# Patient Record
Sex: FEMALE | Race: WHITE | Employment: UNEMPLOYED | URBAN - METROPOLITAN AREA
[De-identification: names, ages, dates, MRNs, and addresses within clinical notes are randomized per-mention and may not be internally consistent; named-entity substitution may affect disease eponyms.]

---

## 2017-03-08 ENCOUNTER — HOSPITAL ENCOUNTER (INPATIENT)
Age: 42
LOS: 3 days | Discharge: HOME | DRG: 881 | End: 2017-03-11
Attending: EMERGENCY MEDICINE | Admitting: PSYCHIATRY & NEUROLOGY
Payer: OTHER GOVERNMENT

## 2017-03-08 DIAGNOSIS — F10.929 ACUTE ALCOHOLIC INTOXICATION, WITH UNSPECIFIED COMPLICATION (HCC): Primary | ICD-10-CM

## 2017-03-08 DIAGNOSIS — R45.851 SUICIDAL IDEATION: ICD-10-CM

## 2017-03-08 LAB
ABSOLUTE BANDS #: 0.05 K/UL (ref 0–1)
ABSOLUTE EOS #: 0.05 K/UL (ref 0–0.4)
ABSOLUTE LYMPH #: 2.28 K/UL (ref 1–4.8)
ABSOLUTE MONO #: 0.32 K/UL (ref 0.1–1.3)
ALBUMIN SERPL-MCNC: 3.7 G/DL (ref 3.5–5.2)
ALBUMIN/GLOBULIN RATIO: ABNORMAL (ref 1–2.5)
ALP BLD-CCNC: 81 U/L (ref 35–104)
ALT SERPL-CCNC: 98 U/L (ref 5–33)
ANION GAP SERPL CALCULATED.3IONS-SCNC: 19 MMOL/L (ref 9–17)
AST SERPL-CCNC: 108 U/L
ATYPICAL LYMPHOCYTE ABSOLUTE COUNT: 0.18 K/UL
ATYPICAL LYMPHOCYTES: 4 % (ref 0–10)
BANDS: 1 % (ref 0–10)
BASOPHILS # BLD: 0 % (ref 0–2)
BASOPHILS ABSOLUTE: 0 K/UL (ref 0–0.2)
BILIRUB SERPL-MCNC: <0.15 MG/DL (ref 0.3–1.2)
BUN BLDV-MCNC: 8 MG/DL (ref 6–20)
BUN/CREAT BLD: ABNORMAL (ref 9–20)
CALCIUM SERPL-MCNC: 8.8 MG/DL (ref 8.6–10.4)
CHLORIDE BLD-SCNC: 98 MMOL/L (ref 98–107)
CO2: 25 MMOL/L (ref 20–31)
CREAT SERPL-MCNC: 0.68 MG/DL (ref 0.5–0.9)
DIFFERENTIAL TYPE: ABNORMAL
EOSINOPHILS RELATIVE PERCENT: 1 % (ref 0–4)
ETHANOL PERCENT: 0.21 %
ETHANOL: 209 MG/DL
GFR AFRICAN AMERICAN: >60 ML/MIN
GFR NON-AFRICAN AMERICAN: >60 ML/MIN
GFR SERPL CREATININE-BSD FRML MDRD: ABNORMAL ML/MIN/{1.73_M2}
GFR SERPL CREATININE-BSD FRML MDRD: ABNORMAL ML/MIN/{1.73_M2}
GLUCOSE BLD-MCNC: 100 MG/DL (ref 70–99)
HCT VFR BLD CALC: 45.4 % (ref 36–46)
HEMOGLOBIN: 15.6 G/DL (ref 12–16)
LIPASE: 45 U/L (ref 13–60)
LYMPHOCYTES # BLD: 51 % (ref 24–44)
MCH RBC QN AUTO: 33.8 PG (ref 26–34)
MCHC RBC AUTO-ENTMCNC: 34.4 G/DL (ref 31–37)
MCV RBC AUTO: 98.3 FL (ref 80–100)
MONOCYTES # BLD: 7 % (ref 1–7)
MORPHOLOGY: NORMAL
PDW BLD-RTO: 14 % (ref 11.5–14.9)
PLATELET # BLD: 220 K/UL (ref 150–450)
PLATELET ESTIMATE: ABNORMAL
PMV BLD AUTO: 7 FL (ref 6–12)
POTASSIUM SERPL-SCNC: 3.6 MMOL/L (ref 3.7–5.3)
RBC # BLD: 4.61 M/UL (ref 4–5.2)
RBC # BLD: ABNORMAL 10*6/UL
SEG NEUTROPHILS: 36 % (ref 36–66)
SEGMENTED NEUTROPHILS ABSOLUTE COUNT: 1.62 K/UL (ref 1.3–9.1)
SODIUM BLD-SCNC: 142 MMOL/L (ref 135–144)
TOTAL PROTEIN: 6.7 G/DL (ref 6.4–8.3)
WBC # BLD: 4.5 K/UL (ref 3.5–11)
WBC # BLD: ABNORMAL 10*3/UL

## 2017-03-08 PROCEDURE — 36415 COLL VENOUS BLD VENIPUNCTURE: CPT

## 2017-03-08 PROCEDURE — 83690 ASSAY OF LIPASE: CPT

## 2017-03-08 PROCEDURE — 80053 COMPREHEN METABOLIC PANEL: CPT

## 2017-03-08 PROCEDURE — 99285 EMERGENCY DEPT VISIT HI MDM: CPT

## 2017-03-08 PROCEDURE — 1240000000 HC EMOTIONAL WELLNESS R&B

## 2017-03-08 PROCEDURE — G0480 DRUG TEST DEF 1-7 CLASSES: HCPCS

## 2017-03-08 PROCEDURE — 85025 COMPLETE CBC W/AUTO DIFF WBC: CPT

## 2017-03-08 RX ORDER — NICOTINE 21 MG/24HR
1 PATCH, TRANSDERMAL 24 HOURS TRANSDERMAL DAILY
Status: CANCELLED | OUTPATIENT
Start: 2017-03-09

## 2017-03-08 ASSESSMENT — ENCOUNTER SYMPTOMS
RHINORRHEA: 0
TROUBLE SWALLOWING: 0
ABDOMINAL PAIN: 0
COUGH: 0
VOMITING: 1
EYE PAIN: 0
PHOTOPHOBIA: 0
NAUSEA: 1
EYE DISCHARGE: 0
SORE THROAT: 0
DIARRHEA: 1
SHORTNESS OF BREATH: 0

## 2017-03-09 LAB
-: ABNORMAL
AMORPHOUS: ABNORMAL
AMPHETAMINE SCREEN URINE: NEGATIVE
BACTERIA: ABNORMAL
BARBITURATE SCREEN URINE: NEGATIVE
BENZODIAZEPINE SCREEN, URINE: NEGATIVE
BILIRUBIN URINE: ABNORMAL
BUPRENORPHINE URINE: ABNORMAL
CANNABINOID SCREEN URINE: POSITIVE
CASTS UA: ABNORMAL /LPF
COCAINE METABOLITE, URINE: NEGATIVE
COLOR: ABNORMAL
COMMENT UA: ABNORMAL
CRYSTALS, UA: ABNORMAL /HPF
EPITHELIAL CELLS UA: ABNORMAL /HPF
GLUCOSE URINE: NEGATIVE
KETONES, URINE: ABNORMAL
LEUKOCYTE ESTERASE, URINE: NEGATIVE
MDMA URINE: ABNORMAL
METHADONE SCREEN, URINE: NEGATIVE
METHAMPHETAMINE, URINE: ABNORMAL
MUCUS: ABNORMAL
NITRITE, URINE: NEGATIVE
OPIATES, URINE: NEGATIVE
OTHER OBSERVATIONS UA: ABNORMAL
OXYCODONE SCREEN URINE: NEGATIVE
PH UA: 6 (ref 5–8)
PHENCYCLIDINE, URINE: NEGATIVE
PROPOXYPHENE, URINE: ABNORMAL
PROTEIN UA: ABNORMAL
RBC UA: ABNORMAL /HPF
RENAL EPITHELIAL, UA: ABNORMAL /HPF
SPECIFIC GRAVITY UA: 1.03 (ref 1–1.03)
TEST INFORMATION: ABNORMAL
TRICHOMONAS: ABNORMAL
TRICYCLIC ANTIDEPRESSANTS, UR: ABNORMAL
TURBIDITY: ABNORMAL
URINE HGB: NEGATIVE
UROBILINOGEN, URINE: NORMAL
WBC UA: ABNORMAL /HPF
YEAST: ABNORMAL

## 2017-03-09 PROCEDURE — 94664 DEMO&/EVAL PT USE INHALER: CPT

## 2017-03-09 PROCEDURE — 6370000000 HC RX 637 (ALT 250 FOR IP): Performed by: PSYCHIATRY & NEUROLOGY

## 2017-03-09 PROCEDURE — 81001 URINALYSIS AUTO W/SCOPE: CPT

## 2017-03-09 PROCEDURE — 80307 DRUG TEST PRSMV CHEM ANLYZR: CPT

## 2017-03-09 PROCEDURE — 1240000000 HC EMOTIONAL WELLNESS R&B

## 2017-03-09 RX ORDER — THIAMINE MONONITRATE (VIT B1) 100 MG
100 TABLET ORAL DAILY
Status: DISCONTINUED | OUTPATIENT
Start: 2017-03-09 | End: 2017-03-11 | Stop reason: HOSPADM

## 2017-03-09 RX ORDER — HYDROXYZINE HYDROCHLORIDE 25 MG/1
25 TABLET, FILM COATED ORAL 3 TIMES DAILY PRN
Status: DISCONTINUED | OUTPATIENT
Start: 2017-03-09 | End: 2017-03-11 | Stop reason: HOSPADM

## 2017-03-09 RX ORDER — CHLORDIAZEPOXIDE HYDROCHLORIDE 25 MG/1
25 CAPSULE, GELATIN COATED ORAL 2 TIMES DAILY
Status: DISCONTINUED | OUTPATIENT
Start: 2017-03-09 | End: 2017-03-11 | Stop reason: HOSPADM

## 2017-03-09 RX ORDER — ACETAMINOPHEN 325 MG/1
650 TABLET ORAL EVERY 4 HOURS PRN
Status: DISCONTINUED | OUTPATIENT
Start: 2017-03-09 | End: 2017-03-11 | Stop reason: HOSPADM

## 2017-03-09 RX ORDER — DIPHENHYDRAMINE HCL 25 MG
25 TABLET ORAL NIGHTLY PRN
Status: DISCONTINUED | OUTPATIENT
Start: 2017-03-09 | End: 2017-03-11 | Stop reason: HOSPADM

## 2017-03-09 RX ORDER — M-VIT,TX,IRON,MINS/CALC/FOLIC 27MG-0.4MG
1 TABLET ORAL DAILY
Status: DISCONTINUED | OUTPATIENT
Start: 2017-03-09 | End: 2017-03-11 | Stop reason: HOSPADM

## 2017-03-09 RX ORDER — MAGNESIUM HYDROXIDE/ALUMINUM HYDROXICE/SIMETHICONE 120; 1200; 1200 MG/30ML; MG/30ML; MG/30ML
30 SUSPENSION ORAL PRN
Status: DISCONTINUED | OUTPATIENT
Start: 2017-03-09 | End: 2017-03-11 | Stop reason: HOSPADM

## 2017-03-09 RX ORDER — MIRTAZAPINE 15 MG/1
15 TABLET, FILM COATED ORAL NIGHTLY
Status: DISCONTINUED | OUTPATIENT
Start: 2017-03-09 | End: 2017-03-11 | Stop reason: HOSPADM

## 2017-03-09 RX ORDER — OLANZAPINE 5 MG/1
5 TABLET ORAL NIGHTLY
Status: DISCONTINUED | OUTPATIENT
Start: 2017-03-09 | End: 2017-03-11 | Stop reason: HOSPADM

## 2017-03-09 RX ORDER — TRAZODONE HYDROCHLORIDE 50 MG/1
50 TABLET ORAL NIGHTLY PRN
Status: DISCONTINUED | OUTPATIENT
Start: 2017-03-09 | End: 2017-03-11 | Stop reason: HOSPADM

## 2017-03-09 RX ORDER — FOLIC ACID 1 MG/1
1 TABLET ORAL DAILY
Status: DISCONTINUED | OUTPATIENT
Start: 2017-03-09 | End: 2017-03-11 | Stop reason: HOSPADM

## 2017-03-09 RX ORDER — ALBUTEROL SULFATE 90 UG/1
2 AEROSOL, METERED RESPIRATORY (INHALATION) EVERY 4 HOURS PRN
Status: DISCONTINUED | OUTPATIENT
Start: 2017-03-09 | End: 2017-03-11 | Stop reason: HOSPADM

## 2017-03-09 RX ORDER — BENZTROPINE MESYLATE 1 MG/ML
2 INJECTION INTRAMUSCULAR; INTRAVENOUS 2 TIMES DAILY PRN
Status: DISCONTINUED | OUTPATIENT
Start: 2017-03-09 | End: 2017-03-11 | Stop reason: HOSPADM

## 2017-03-09 RX ADMIN — MULTIPLE VITAMINS W/ MINERALS TAB 1 TABLET: TAB at 09:00

## 2017-03-09 RX ADMIN — Medication 100 MG: at 09:00

## 2017-03-09 RX ADMIN — CHLORDIAZEPOXIDE HYDROCHLORIDE 25 MG: 25 CAPSULE ORAL at 09:00

## 2017-03-09 RX ADMIN — FOLIC ACID 1 MG: 1 TABLET ORAL at 09:00

## 2017-03-09 RX ADMIN — ACETAMINOPHEN 650 MG: 325 TABLET ORAL at 19:53

## 2017-03-09 ASSESSMENT — PAIN SCALES - GENERAL
PAINLEVEL_OUTOF10: 10
PAINLEVEL_OUTOF10: 0
PAINLEVEL_OUTOF10: 3

## 2017-03-09 ASSESSMENT — LIFESTYLE VARIABLES: HISTORY_ALCOHOL_USE: YES

## 2017-03-09 ASSESSMENT — SLEEP AND FATIGUE QUESTIONNAIRES
AVERAGE NUMBER OF SLEEP HOURS: 8
DO YOU HAVE DIFFICULTY SLEEPING: NO
DO YOU USE A SLEEP AID: NO

## 2017-03-10 PROCEDURE — 6370000000 HC RX 637 (ALT 250 FOR IP): Performed by: PSYCHIATRY & NEUROLOGY

## 2017-03-10 PROCEDURE — 1240000000 HC EMOTIONAL WELLNESS R&B

## 2017-03-10 RX ORDER — MIRTAZAPINE 15 MG/1
15 TABLET, FILM COATED ORAL NIGHTLY
Qty: 30 TABLET | Refills: 0 | Status: SHIPPED | OUTPATIENT
Start: 2017-03-10 | End: 2019-06-05 | Stop reason: ALTCHOICE

## 2017-03-10 RX ORDER — OLANZAPINE 5 MG/1
5 TABLET ORAL NIGHTLY
Qty: 30 TABLET | Refills: 0 | Status: SHIPPED | OUTPATIENT
Start: 2017-03-10 | End: 2019-06-05 | Stop reason: ALTCHOICE

## 2017-03-10 RX ADMIN — ACETAMINOPHEN 650 MG: 325 TABLET ORAL at 14:00

## 2017-03-10 RX ADMIN — MIRTAZAPINE 15 MG: 15 TABLET, FILM COATED ORAL at 21:13

## 2017-03-10 RX ADMIN — ACETAMINOPHEN 650 MG: 325 TABLET ORAL at 01:53

## 2017-03-10 RX ADMIN — MULTIPLE VITAMINS W/ MINERALS TAB 1 TABLET: TAB at 08:43

## 2017-03-10 RX ADMIN — Medication 100 MG: at 08:43

## 2017-03-10 RX ADMIN — OLANZAPINE 5 MG: 5 TABLET, FILM COATED ORAL at 21:13

## 2017-03-10 RX ADMIN — CHLORDIAZEPOXIDE HYDROCHLORIDE 25 MG: 25 CAPSULE ORAL at 08:43

## 2017-03-10 RX ADMIN — ACETAMINOPHEN 650 MG: 325 TABLET ORAL at 08:43

## 2017-03-10 RX ADMIN — ACETAMINOPHEN 650 MG: 325 TABLET ORAL at 21:13

## 2017-03-10 RX ADMIN — FOLIC ACID 1 MG: 1 TABLET ORAL at 08:43

## 2017-03-10 ASSESSMENT — PAIN SCALES - GENERAL
PAINLEVEL_OUTOF10: 2
PAINLEVEL_OUTOF10: 3
PAINLEVEL_OUTOF10: 8
PAINLEVEL_OUTOF10: 7
PAINLEVEL_OUTOF10: 3
PAINLEVEL_OUTOF10: 10
PAINLEVEL_OUTOF10: 0
PAINLEVEL_OUTOF10: 10

## 2017-03-11 VITALS
RESPIRATION RATE: 14 BRPM | SYSTOLIC BLOOD PRESSURE: 138 MMHG | TEMPERATURE: 97.9 F | BODY MASS INDEX: 33.8 KG/M2 | WEIGHT: 198 LBS | HEIGHT: 64 IN | HEART RATE: 60 BPM | DIASTOLIC BLOOD PRESSURE: 77 MMHG | OXYGEN SATURATION: 95 %

## 2017-03-11 PROCEDURE — 5130000000 HC BRIDGE APPOINTMENT

## 2017-03-11 PROCEDURE — 6370000000 HC RX 637 (ALT 250 FOR IP): Performed by: PSYCHIATRY & NEUROLOGY

## 2017-03-11 RX ADMIN — ACETAMINOPHEN 650 MG: 325 TABLET ORAL at 06:00

## 2017-03-11 RX ADMIN — MULTIPLE VITAMINS W/ MINERALS TAB 1 TABLET: TAB at 08:14

## 2017-03-11 RX ADMIN — Medication 100 MG: at 08:14

## 2017-03-11 RX ADMIN — FOLIC ACID 1 MG: 1 TABLET ORAL at 08:14

## 2017-03-11 ASSESSMENT — PAIN SCALES - GENERAL
PAINLEVEL_OUTOF10: 3
PAINLEVEL_OUTOF10: 0

## 2017-03-16 ENCOUNTER — APPOINTMENT (OUTPATIENT)
Dept: GENERAL RADIOLOGY | Age: 42
DRG: 138 | End: 2017-03-16
Payer: OTHER GOVERNMENT

## 2017-03-16 ENCOUNTER — HOSPITAL ENCOUNTER (OUTPATIENT)
Age: 42
Setting detail: OBSERVATION
Discharge: HOME | DRG: 138 | End: 2017-03-18
Attending: EMERGENCY MEDICINE | Admitting: SURGERY
Payer: OTHER GOVERNMENT

## 2017-03-16 DIAGNOSIS — S01.81XA FACIAL LACERATION, INITIAL ENCOUNTER: ICD-10-CM
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
## 2017-03-16 DIAGNOSIS — F10.920 ACUTE ALCOHOLIC INTOXICATION, UNCOMPLICATED (HCC): ICD-10-CM

## 2017-03-16 PROBLEM — S01.85XA DOG BITE OF FACE: Status: ACTIVE | Noted: 2017-03-16

## 2017-03-16 PROBLEM — S61.259A DOG BITE OF FINGER: Status: ACTIVE | Noted: 2017-03-16

## 2017-03-16 PROBLEM — F10.929 ACUTE ALCOHOL INTOXICATION (HCC): Status: ACTIVE | Noted: 2017-03-16

## 2017-03-16 LAB
-: NORMAL
ABSOLUTE EOS #: 0.2 K/UL (ref 0–0.4)
ABSOLUTE LYMPH #: 3.3 K/UL (ref 1–4.8)
ABSOLUTE MONO #: 0.5 K/UL (ref 0.1–1.2)
AMORPHOUS: NORMAL
AMPHETAMINE SCREEN URINE: NEGATIVE
ANION GAP SERPL CALCULATED.3IONS-SCNC: 18 MMOL/L (ref 9–17)
BACTERIA: NORMAL
BARBITURATE SCREEN URINE: NEGATIVE
BASOPHILS # BLD: 0 % (ref 0–2)
BASOPHILS ABSOLUTE: 0 K/UL (ref 0–0.2)
BENZODIAZEPINE SCREEN, URINE: NEGATIVE
BILIRUBIN URINE: NEGATIVE
BUN BLDV-MCNC: 9 MG/DL (ref 6–20)
BUN/CREAT BLD: ABNORMAL (ref 9–20)
BUPRENORPHINE URINE: ABNORMAL
CALCIUM SERPL-MCNC: 8.9 MG/DL (ref 8.6–10.4)
CANNABINOID SCREEN URINE: POSITIVE
CASTS UA: NORMAL /LPF (ref 0–8)
CHLORIDE BLD-SCNC: 97 MMOL/L (ref 98–107)
CO2: 20 MMOL/L (ref 20–31)
COCAINE METABOLITE, URINE: NEGATIVE
COLOR: YELLOW
COMMENT UA: ABNORMAL
CREAT SERPL-MCNC: 0.7 MG/DL (ref 0.5–0.9)
CRYSTALS, UA: NORMAL /HPF
DIFFERENTIAL TYPE: ABNORMAL
EOSINOPHILS RELATIVE PERCENT: 4 % (ref 1–4)
EPITHELIAL CELLS UA: NORMAL /HPF (ref 0–5)
ETHANOL PERCENT: 0.22 %
ETHANOL: 216 MG/DL
GFR AFRICAN AMERICAN: >60 ML/MIN
GFR NON-AFRICAN AMERICAN: >60 ML/MIN
GFR SERPL CREATININE-BSD FRML MDRD: ABNORMAL ML/MIN/{1.73_M2}
GFR SERPL CREATININE-BSD FRML MDRD: ABNORMAL ML/MIN/{1.73_M2}
GLUCOSE BLD-MCNC: 99 MG/DL (ref 70–99)
GLUCOSE URINE: NEGATIVE
HCT VFR BLD CALC: 44.6 % (ref 36–46)
HEMOGLOBIN: 15.2 G/DL (ref 12–16)
INR BLD: 0.9
KETONES, URINE: NEGATIVE
LEUKOCYTE ESTERASE, URINE: NEGATIVE
LYMPHOCYTES # BLD: 50 % (ref 24–44)
MCH RBC QN AUTO: 32.9 PG (ref 26–34)
MCHC RBC AUTO-ENTMCNC: 34.1 G/DL (ref 31–37)
MCV RBC AUTO: 96.4 FL (ref 80–100)
MDMA URINE: ABNORMAL
METHADONE SCREEN, URINE: NEGATIVE
METHAMPHETAMINE, URINE: ABNORMAL
MONOCYTES # BLD: 7 % (ref 2–11)
MUCUS: NORMAL
NITRITE, URINE: NEGATIVE
OPIATES, URINE: NEGATIVE
OTHER OBSERVATIONS UA: NORMAL
OXYCODONE SCREEN URINE: NEGATIVE
PDW BLD-RTO: 14.5 % (ref 12.5–15.4)
PH UA: 5 (ref 5–8)
PHENCYCLIDINE, URINE: NEGATIVE
PLATELET # BLD: 288 K/UL (ref 140–450)
PLATELET ESTIMATE: ABNORMAL
PMV BLD AUTO: 7.7 FL (ref 6–12)
POTASSIUM SERPL-SCNC: 4.2 MMOL/L (ref 3.7–5.3)
PROPOXYPHENE, URINE: ABNORMAL
PROTEIN UA: NEGATIVE
PROTHROMBIN TIME: 10 SEC (ref 9.4–12.6)
RBC # BLD: 4.63 M/UL (ref 4–5.2)
RBC # BLD: ABNORMAL 10*6/UL
RBC UA: NORMAL /HPF (ref 0–4)
RENAL EPITHELIAL, UA: NORMAL /HPF
SEG NEUTROPHILS: 39 % (ref 36–66)
SEGMENTED NEUTROPHILS ABSOLUTE COUNT: 2.5 K/UL (ref 1.8–7.7)
SODIUM BLD-SCNC: 135 MMOL/L (ref 135–144)
SPECIFIC GRAVITY UA: 1 (ref 1–1.03)
TEST INFORMATION: ABNORMAL
TRICHOMONAS: NORMAL
TRICYCLIC ANTIDEPRESSANTS, UR: ABNORMAL
TURBIDITY: ABNORMAL
URINE HGB: ABNORMAL
UROBILINOGEN, URINE: NORMAL
WBC # BLD: 6.5 K/UL (ref 3.5–11)
WBC # BLD: ABNORMAL 10*3/UL
WBC UA: NORMAL /HPF (ref 0–5)
YEAST: NORMAL

## 2017-03-16 PROCEDURE — 2580000003 HC RX 258: Performed by: EMERGENCY MEDICINE

## 2017-03-16 PROCEDURE — 80307 DRUG TEST PRSMV CHEM ANLYZR: CPT

## 2017-03-16 PROCEDURE — 99284 EMERGENCY DEPT VISIT MOD MDM: CPT

## 2017-03-16 PROCEDURE — 81001 URINALYSIS AUTO W/SCOPE: CPT

## 2017-03-16 PROCEDURE — 96376 TX/PRO/DX INJ SAME DRUG ADON: CPT

## 2017-03-16 PROCEDURE — 6360000002 HC RX W HCPCS: Performed by: EMERGENCY MEDICINE

## 2017-03-16 PROCEDURE — 85610 PROTHROMBIN TIME: CPT

## 2017-03-16 PROCEDURE — 96365 THER/PROPH/DIAG IV INF INIT: CPT

## 2017-03-16 PROCEDURE — 6370000000 HC RX 637 (ALT 250 FOR IP): Performed by: STUDENT IN AN ORGANIZED HEALTH CARE EDUCATION/TRAINING PROGRAM

## 2017-03-16 PROCEDURE — 6360000002 HC RX W HCPCS: Performed by: STUDENT IN AN ORGANIZED HEALTH CARE EDUCATION/TRAINING PROGRAM

## 2017-03-16 PROCEDURE — 73130 X-RAY EXAM OF HAND: CPT

## 2017-03-16 PROCEDURE — 90471 IMMUNIZATION ADMIN: CPT | Performed by: EMERGENCY MEDICINE

## 2017-03-16 PROCEDURE — 80048 BASIC METABOLIC PNL TOTAL CA: CPT

## 2017-03-16 PROCEDURE — G0480 DRUG TEST DEF 1-7 CLASSES: HCPCS

## 2017-03-16 PROCEDURE — G0378 HOSPITAL OBSERVATION PER HR: HCPCS

## 2017-03-16 PROCEDURE — 90715 TDAP VACCINE 7 YRS/> IM: CPT | Performed by: EMERGENCY MEDICINE

## 2017-03-16 PROCEDURE — 85025 COMPLETE CBC W/AUTO DIFF WBC: CPT

## 2017-03-16 RX ORDER — OXYCODONE HYDROCHLORIDE AND ACETAMINOPHEN 5; 325 MG/1; MG/1
1 TABLET ORAL EVERY 4 HOURS PRN
Status: DISCONTINUED | OUTPATIENT
Start: 2017-03-16 | End: 2017-03-17

## 2017-03-16 RX ORDER — OLANZAPINE 5 MG/1
5 TABLET ORAL NIGHTLY
Status: DISCONTINUED | OUTPATIENT
Start: 2017-03-16 | End: 2017-03-18 | Stop reason: HOSPADM

## 2017-03-16 RX ORDER — LIDOCAINE HYDROCHLORIDE 10 MG/ML
5 INJECTION, SOLUTION INFILTRATION; PERINEURAL ONCE
Status: DISCONTINUED | OUTPATIENT
Start: 2017-03-16 | End: 2017-03-17

## 2017-03-16 RX ORDER — MORPHINE SULFATE 4 MG/ML
4 INJECTION, SOLUTION INTRAMUSCULAR; INTRAVENOUS
Status: DISCONTINUED | OUTPATIENT
Start: 2017-03-16 | End: 2017-03-18 | Stop reason: HOSPADM

## 2017-03-16 RX ORDER — SODIUM CHLORIDE 0.9 % (FLUSH) 0.9 %
10 SYRINGE (ML) INJECTION EVERY 12 HOURS SCHEDULED
Status: DISCONTINUED | OUTPATIENT
Start: 2017-03-16 | End: 2017-03-18 | Stop reason: HOSPADM

## 2017-03-16 RX ORDER — SODIUM CHLORIDE 9 MG/ML
INJECTION, SOLUTION INTRAVENOUS CONTINUOUS
Status: DISCONTINUED | OUTPATIENT
Start: 2017-03-16 | End: 2017-03-18

## 2017-03-16 RX ORDER — MIRTAZAPINE 15 MG/1
15 TABLET, FILM COATED ORAL NIGHTLY
Status: DISCONTINUED | OUTPATIENT
Start: 2017-03-16 | End: 2017-03-18 | Stop reason: HOSPADM

## 2017-03-16 RX ORDER — SODIUM CHLORIDE 0.9 % (FLUSH) 0.9 %
10 SYRINGE (ML) INJECTION PRN
Status: DISCONTINUED | OUTPATIENT
Start: 2017-03-16 | End: 2017-03-18 | Stop reason: HOSPADM

## 2017-03-16 RX ORDER — LIDOCAINE HYDROCHLORIDE 10 MG/ML
5 INJECTION, SOLUTION INFILTRATION; PERINEURAL ONCE
Status: COMPLETED | OUTPATIENT
Start: 2017-03-16 | End: 2017-03-17

## 2017-03-16 RX ORDER — MORPHINE SULFATE 2 MG/ML
2 INJECTION, SOLUTION INTRAMUSCULAR; INTRAVENOUS
Status: DISCONTINUED | OUTPATIENT
Start: 2017-03-16 | End: 2017-03-18 | Stop reason: HOSPADM

## 2017-03-16 RX ORDER — OXYCODONE HYDROCHLORIDE AND ACETAMINOPHEN 5; 325 MG/1; MG/1
2 TABLET ORAL EVERY 4 HOURS PRN
Status: DISCONTINUED | OUTPATIENT
Start: 2017-03-16 | End: 2017-03-17

## 2017-03-16 RX ORDER — BACITRACIN, NEOMYCIN, POLYMYXIN B 400; 3.5; 5 [USP'U]/G; MG/G; [USP'U]/G
OINTMENT TOPICAL 2 TIMES DAILY
Status: DISCONTINUED | OUTPATIENT
Start: 2017-03-16 | End: 2017-03-17

## 2017-03-16 RX ORDER — ALBUTEROL SULFATE 90 UG/1
2 AEROSOL, METERED RESPIRATORY (INHALATION) EVERY 4 HOURS PRN
Status: DISCONTINUED | OUTPATIENT
Start: 2017-03-16 | End: 2017-03-18 | Stop reason: HOSPADM

## 2017-03-16 RX ORDER — 0.9 % SODIUM CHLORIDE 0.9 %
1000 INTRAVENOUS SOLUTION INTRAVENOUS ONCE
Status: COMPLETED | OUTPATIENT
Start: 2017-03-16 | End: 2017-03-16

## 2017-03-16 RX ADMIN — TETANUS TOXOID, REDUCED DIPHTHERIA TOXOID AND ACELLULAR PERTUSSIS VACCINE, ADSORBED 0.5 ML: 5; 2.5; 8; 8; 2.5 SUSPENSION INTRAMUSCULAR at 19:33

## 2017-03-16 RX ADMIN — POLYMYXIN B SULFATE, BACITRACIN ZINC, NEOMYCIN SULFATE: 5000; 3.5; 4 OINTMENT TOPICAL at 23:00

## 2017-03-16 RX ADMIN — SODIUM CHLORIDE 3 G: 900 INJECTION INTRAVENOUS at 20:00

## 2017-03-16 RX ADMIN — SODIUM CHLORIDE: 9 INJECTION, SOLUTION INTRAVENOUS at 22:30

## 2017-03-16 RX ADMIN — SODIUM CHLORIDE 1000 ML: 9 INJECTION, SOLUTION INTRAVENOUS at 19:38

## 2017-03-16 RX ADMIN — MORPHINE SULFATE 4 MG: 4 INJECTION, SOLUTION INTRAMUSCULAR; INTRAVENOUS at 22:40

## 2017-03-16 ASSESSMENT — ENCOUNTER SYMPTOMS
COUGH: 0
CONSTIPATION: 0
SHORTNESS OF BREATH: 0
ABDOMINAL PAIN: 0
PHOTOPHOBIA: 0
NAUSEA: 0
CHEST TIGHTNESS: 0
DIARRHEA: 0
VOMITING: 0

## 2017-03-16 ASSESSMENT — PAIN SCALES - GENERAL
PAINLEVEL_OUTOF10: 10
PAINLEVEL_OUTOF10: 10

## 2017-03-16 ASSESSMENT — PAIN DESCRIPTION - ORIENTATION: ORIENTATION: LEFT

## 2017-03-16 ASSESSMENT — PAIN DESCRIPTION - LOCATION
LOCATION: MOUTH;OTHER (COMMENT)
LOCATION: FACE;MOUTH

## 2017-03-16 ASSESSMENT — PAIN DESCRIPTION - PAIN TYPE
TYPE: ACUTE PAIN
TYPE: ACUTE PAIN

## 2017-03-16 ASSESSMENT — PAIN DESCRIPTION - PROGRESSION: CLINICAL_PROGRESSION: NOT CHANGED

## 2017-03-16 ASSESSMENT — PAIN DESCRIPTION - FREQUENCY: FREQUENCY: CONTINUOUS

## 2017-03-16 ASSESSMENT — PAIN DESCRIPTION - DESCRIPTORS: DESCRIPTORS: CONSTANT

## 2017-03-17 ENCOUNTER — ANESTHESIA (OUTPATIENT)
Dept: OPERATING ROOM | Age: 42
DRG: 138 | End: 2017-03-17
Payer: OTHER GOVERNMENT

## 2017-03-17 ENCOUNTER — ANESTHESIA EVENT (OUTPATIENT)
Dept: OPERATING ROOM | Age: 42
DRG: 138 | End: 2017-03-17
Payer: OTHER GOVERNMENT

## 2017-03-17 VITALS — TEMPERATURE: 95 F | SYSTOLIC BLOOD PRESSURE: 159 MMHG | DIASTOLIC BLOOD PRESSURE: 148 MMHG | OXYGEN SATURATION: 97 %

## 2017-03-17 PROCEDURE — 2500000003 HC RX 250 WO HCPCS: Performed by: SPECIALIST

## 2017-03-17 PROCEDURE — 6360000002 HC RX W HCPCS: Performed by: EMERGENCY MEDICINE

## 2017-03-17 PROCEDURE — 2580000003 HC RX 258: Performed by: STUDENT IN AN ORGANIZED HEALTH CARE EDUCATION/TRAINING PROGRAM

## 2017-03-17 PROCEDURE — 2580000003 HC RX 258

## 2017-03-17 PROCEDURE — 6360000002 HC RX W HCPCS: Performed by: NURSE ANESTHETIST, CERTIFIED REGISTERED

## 2017-03-17 PROCEDURE — 2500000003 HC RX 250 WO HCPCS: Performed by: NURSE ANESTHETIST, CERTIFIED REGISTERED

## 2017-03-17 PROCEDURE — G0378 HOSPITAL OBSERVATION PER HR: HCPCS

## 2017-03-17 PROCEDURE — 6360000002 HC RX W HCPCS: Performed by: ANESTHESIOLOGY

## 2017-03-17 PROCEDURE — 7100000000 HC PACU RECOVERY - FIRST 15 MIN: Performed by: ORAL & MAXILLOFACIAL SURGERY

## 2017-03-17 PROCEDURE — 2500000003 HC RX 250 WO HCPCS

## 2017-03-17 PROCEDURE — 3700000000 HC ANESTHESIA ATTENDED CARE: Performed by: ORAL & MAXILLOFACIAL SURGERY

## 2017-03-17 PROCEDURE — 7100000001 HC PACU RECOVERY - ADDTL 15 MIN: Performed by: ORAL & MAXILLOFACIAL SURGERY

## 2017-03-17 PROCEDURE — 2500000003 HC RX 250 WO HCPCS: Performed by: EMERGENCY MEDICINE

## 2017-03-17 PROCEDURE — 96367 TX/PROPH/DG ADDL SEQ IV INF: CPT

## 2017-03-17 PROCEDURE — 3600000003 HC SURGERY LEVEL 3 BASE: Performed by: ORAL & MAXILLOFACIAL SURGERY

## 2017-03-17 PROCEDURE — 2500000003 HC RX 250 WO HCPCS: Performed by: ORAL & MAXILLOFACIAL SURGERY

## 2017-03-17 PROCEDURE — 96376 TX/PRO/DX INJ SAME DRUG ADON: CPT

## 2017-03-17 PROCEDURE — 2580000003 HC RX 258: Performed by: SPECIALIST

## 2017-03-17 PROCEDURE — 3600000013 HC SURGERY LEVEL 3 ADDTL 15MIN: Performed by: ORAL & MAXILLOFACIAL SURGERY

## 2017-03-17 PROCEDURE — 2580000003 HC RX 258: Performed by: ORAL & MAXILLOFACIAL SURGERY

## 2017-03-17 PROCEDURE — 96366 THER/PROPH/DIAG IV INF ADDON: CPT

## 2017-03-17 PROCEDURE — 6360000002 HC RX W HCPCS: Performed by: STUDENT IN AN ORGANIZED HEALTH CARE EDUCATION/TRAINING PROGRAM

## 2017-03-17 PROCEDURE — 6370000000 HC RX 637 (ALT 250 FOR IP): Performed by: STUDENT IN AN ORGANIZED HEALTH CARE EDUCATION/TRAINING PROGRAM

## 2017-03-17 PROCEDURE — 2580000003 HC RX 258: Performed by: EMERGENCY MEDICINE

## 2017-03-17 PROCEDURE — 3700000001 HC ADD 15 MINUTES (ANESTHESIA): Performed by: ORAL & MAXILLOFACIAL SURGERY

## 2017-03-17 PROCEDURE — 6360000002 HC RX W HCPCS: Performed by: SPECIALIST

## 2017-03-17 PROCEDURE — 2500000003 HC RX 250 WO HCPCS: Performed by: STUDENT IN AN ORGANIZED HEALTH CARE EDUCATION/TRAINING PROGRAM

## 2017-03-17 RX ORDER — ROCURONIUM BROMIDE 10 MG/ML
INJECTION, SOLUTION INTRAVENOUS PRN
Status: DISCONTINUED | OUTPATIENT
Start: 2017-03-17 | End: 2017-03-17 | Stop reason: SDUPTHER

## 2017-03-17 RX ORDER — SODIUM CHLORIDE 9 MG/ML
INJECTION, SOLUTION INTRAVENOUS CONTINUOUS PRN
Status: DISCONTINUED | OUTPATIENT
Start: 2017-03-17 | End: 2017-03-17 | Stop reason: SDUPTHER

## 2017-03-17 RX ORDER — FENTANYL CITRATE 50 UG/ML
25 INJECTION, SOLUTION INTRAMUSCULAR; INTRAVENOUS EVERY 5 MIN PRN
Status: DISCONTINUED | OUTPATIENT
Start: 2017-03-17 | End: 2017-03-17

## 2017-03-17 RX ORDER — FENTANYL CITRATE 50 UG/ML
INJECTION, SOLUTION INTRAMUSCULAR; INTRAVENOUS PRN
Status: DISCONTINUED | OUTPATIENT
Start: 2017-03-17 | End: 2017-03-17 | Stop reason: SDUPTHER

## 2017-03-17 RX ORDER — FENTANYL CITRATE 50 UG/ML
25 INJECTION, SOLUTION INTRAMUSCULAR; INTRAVENOUS EVERY 5 MIN PRN
Status: DISCONTINUED | OUTPATIENT
Start: 2017-03-17 | End: 2017-03-17 | Stop reason: HOSPADM

## 2017-03-17 RX ORDER — FENTANYL CITRATE 50 UG/ML
50 INJECTION, SOLUTION INTRAMUSCULAR; INTRAVENOUS EVERY 5 MIN PRN
Status: DISCONTINUED | OUTPATIENT
Start: 2017-03-17 | End: 2017-03-17 | Stop reason: HOSPADM

## 2017-03-17 RX ORDER — PROPOFOL 10 MG/ML
INJECTION, EMULSION INTRAVENOUS PRN
Status: DISCONTINUED | OUTPATIENT
Start: 2017-03-17 | End: 2017-03-17 | Stop reason: SDUPTHER

## 2017-03-17 RX ORDER — BUPIVACAINE HYDROCHLORIDE 5 MG/ML
INJECTION, SOLUTION EPIDURAL; INTRACAUDAL PRN
Status: DISCONTINUED | OUTPATIENT
Start: 2017-03-17 | End: 2017-03-17 | Stop reason: HOSPADM

## 2017-03-17 RX ORDER — LIDOCAINE HYDROCHLORIDE 10 MG/ML
INJECTION, SOLUTION INFILTRATION; PERINEURAL PRN
Status: DISCONTINUED | OUTPATIENT
Start: 2017-03-17 | End: 2017-03-17 | Stop reason: SDUPTHER

## 2017-03-17 RX ORDER — MAGNESIUM HYDROXIDE 1200 MG/15ML
LIQUID ORAL CONTINUOUS PRN
Status: DISCONTINUED | OUTPATIENT
Start: 2017-03-17 | End: 2017-03-17 | Stop reason: HOSPADM

## 2017-03-17 RX ORDER — ONDANSETRON 2 MG/ML
4 INJECTION INTRAMUSCULAR; INTRAVENOUS
Status: ACTIVE | OUTPATIENT
Start: 2017-03-17 | End: 2017-03-17

## 2017-03-17 RX ORDER — DIPHENHYDRAMINE HYDROCHLORIDE 50 MG/ML
12.5 INJECTION INTRAMUSCULAR; INTRAVENOUS
Status: DISCONTINUED | OUTPATIENT
Start: 2017-03-17 | End: 2017-03-17 | Stop reason: HOSPADM

## 2017-03-17 RX ORDER — MAGNESIUM HYDROXIDE 1200 MG/15ML
LIQUID ORAL
Status: COMPLETED
Start: 2017-03-17 | End: 2017-03-17

## 2017-03-17 RX ORDER — MIDAZOLAM HYDROCHLORIDE 1 MG/ML
1 INJECTION INTRAMUSCULAR; INTRAVENOUS EVERY 5 MIN PRN
Status: DISCONTINUED | OUTPATIENT
Start: 2017-03-17 | End: 2017-03-18 | Stop reason: HOSPADM

## 2017-03-17 RX ORDER — NEOSTIGMINE METHYLSULFATE 1 MG/ML
INJECTION, SOLUTION INTRAVENOUS PRN
Status: DISCONTINUED | OUTPATIENT
Start: 2017-03-17 | End: 2017-03-17 | Stop reason: SDUPTHER

## 2017-03-17 RX ORDER — LABETALOL HYDROCHLORIDE 5 MG/ML
INJECTION, SOLUTION INTRAVENOUS PRN
Status: DISCONTINUED | OUTPATIENT
Start: 2017-03-17 | End: 2017-03-17 | Stop reason: SDUPTHER

## 2017-03-17 RX ORDER — GLYCOPYRROLATE 0.2 MG/ML
INJECTION INTRAMUSCULAR; INTRAVENOUS PRN
Status: DISCONTINUED | OUTPATIENT
Start: 2017-03-17 | End: 2017-03-17 | Stop reason: SDUPTHER

## 2017-03-17 RX ADMIN — SODIUM CHLORIDE: 9 INJECTION, SOLUTION INTRAVENOUS at 15:41

## 2017-03-17 RX ADMIN — LABETALOL HYDROCHLORIDE 5 MG: 5 INJECTION, SOLUTION INTRAVENOUS at 15:38

## 2017-03-17 RX ADMIN — POLYMYXIN B SULFATE, BACITRACIN ZINC, NEOMYCIN SULFATE: 5000; 3.5; 4 OINTMENT TOPICAL at 08:56

## 2017-03-17 RX ADMIN — MORPHINE SULFATE 4 MG: 4 INJECTION, SOLUTION INTRAMUSCULAR; INTRAVENOUS at 07:43

## 2017-03-17 RX ADMIN — MORPHINE SULFATE 4 MG: 4 INJECTION, SOLUTION INTRAMUSCULAR; INTRAVENOUS at 00:40

## 2017-03-17 RX ADMIN — MORPHINE SULFATE 4 MG: 4 INJECTION, SOLUTION INTRAMUSCULAR; INTRAVENOUS at 09:52

## 2017-03-17 RX ADMIN — Medication 5 ML: at 00:29

## 2017-03-17 RX ADMIN — MORPHINE SULFATE 4 MG: 4 INJECTION, SOLUTION INTRAMUSCULAR; INTRAVENOUS at 19:21

## 2017-03-17 RX ADMIN — MORPHINE SULFATE 4 MG: 4 INJECTION, SOLUTION INTRAMUSCULAR; INTRAVENOUS at 14:01

## 2017-03-17 RX ADMIN — ALBUTEROL SULFATE 5 PUFF: 90 AEROSOL, METERED RESPIRATORY (INHALATION) at 14:59

## 2017-03-17 RX ADMIN — SODIUM CHLORIDE 3 G: 900 INJECTION INTRAVENOUS at 19:21

## 2017-03-17 RX ADMIN — SODIUM CHLORIDE 3 G: 900 INJECTION INTRAVENOUS at 08:54

## 2017-03-17 RX ADMIN — MIDAZOLAM HYDROCHLORIDE 2 MG: 1 INJECTION, SOLUTION INTRAMUSCULAR; INTRAVENOUS at 14:57

## 2017-03-17 RX ADMIN — PROPOFOL 200 MG: 10 INJECTION, EMULSION INTRAVENOUS at 14:57

## 2017-03-17 RX ADMIN — MORPHINE SULFATE 4 MG: 4 INJECTION, SOLUTION INTRAMUSCULAR; INTRAVENOUS at 22:39

## 2017-03-17 RX ADMIN — SODIUM CHLORIDE 3 G: 900 INJECTION INTRAVENOUS at 14:01

## 2017-03-17 RX ADMIN — SODIUM CHLORIDE 1000 ML: 900 IRRIGANT IRRIGATION at 12:07

## 2017-03-17 RX ADMIN — ROCURONIUM BROMIDE 50 MG: 10 INJECTION INTRAVENOUS at 14:57

## 2017-03-17 RX ADMIN — FENTANYL CITRATE 100 MCG: 50 INJECTION INTRAMUSCULAR; INTRAVENOUS at 14:57

## 2017-03-17 RX ADMIN — FOLIC ACID: 5 INJECTION, SOLUTION INTRAMUSCULAR; INTRAVENOUS; SUBCUTANEOUS at 09:35

## 2017-03-17 RX ADMIN — MORPHINE SULFATE 4 MG: 4 INJECTION, SOLUTION INTRAMUSCULAR; INTRAVENOUS at 12:06

## 2017-03-17 RX ADMIN — LIDOCAINE HYDROCHLORIDE 50 MG: 10 INJECTION, SOLUTION INFILTRATION; PERINEURAL at 14:57

## 2017-03-17 RX ADMIN — NEOSTIGMINE METHYLSULFATE 4.5 MG: 1 INJECTION INTRAVENOUS at 15:48

## 2017-03-17 RX ADMIN — GLYCOPYRROLATE 0.8 MG: 0.2 INJECTION INTRAMUSCULAR; INTRAVENOUS at 15:48

## 2017-03-17 RX ADMIN — MORPHINE SULFATE 4 MG: 4 INJECTION, SOLUTION INTRAMUSCULAR; INTRAVENOUS at 04:37

## 2017-03-17 RX ADMIN — MORPHINE SULFATE 4 MG: 4 INJECTION, SOLUTION INTRAMUSCULAR; INTRAVENOUS at 17:19

## 2017-03-17 RX ADMIN — FENTANYL CITRATE 50 MCG: 50 INJECTION INTRAMUSCULAR; INTRAVENOUS at 15:10

## 2017-03-17 RX ADMIN — FENTANYL CITRATE 50 MCG: 50 INJECTION INTRAMUSCULAR; INTRAVENOUS at 15:21

## 2017-03-17 RX ADMIN — SODIUM CHLORIDE 3 G: 900 INJECTION INTRAVENOUS at 02:30

## 2017-03-17 ASSESSMENT — PAIN DESCRIPTION - ONSET: ONSET: ON-GOING

## 2017-03-17 ASSESSMENT — PAIN SCALES - GENERAL
PAINLEVEL_OUTOF10: 10
PAINLEVEL_OUTOF10: 10
PAINLEVEL_OUTOF10: 6
PAINLEVEL_OUTOF10: 10
PAINLEVEL_OUTOF10: 8
PAINLEVEL_OUTOF10: 6
PAINLEVEL_OUTOF10: 10
PAINLEVEL_OUTOF10: 6
PAINLEVEL_OUTOF10: 10
PAINLEVEL_OUTOF10: 0
PAINLEVEL_OUTOF10: 9
PAINLEVEL_OUTOF10: 6
PAINLEVEL_OUTOF10: 10
PAINLEVEL_OUTOF10: 6
PAINLEVEL_OUTOF10: 10
PAINLEVEL_OUTOF10: 10

## 2017-03-17 ASSESSMENT — PAIN - FUNCTIONAL ASSESSMENT: PAIN_FUNCTIONAL_ASSESSMENT: 0-10

## 2017-03-17 ASSESSMENT — PAIN DESCRIPTION - PROGRESSION: CLINICAL_PROGRESSION: NOT CHANGED

## 2017-03-17 ASSESSMENT — PAIN DESCRIPTION - ORIENTATION: ORIENTATION: LEFT;MID

## 2017-03-17 ASSESSMENT — PAIN DESCRIPTION - LOCATION: LOCATION: FACE

## 2017-03-17 ASSESSMENT — PAIN DESCRIPTION - DESCRIPTORS: DESCRIPTORS: ACHING;DISCOMFORT

## 2017-03-17 ASSESSMENT — PAIN DESCRIPTION - PAIN TYPE: TYPE: ACUTE PAIN;SURGICAL PAIN

## 2017-03-17 ASSESSMENT — ENCOUNTER SYMPTOMS
SHORTNESS OF BREATH: 0
STRIDOR: 0

## 2017-03-17 ASSESSMENT — PAIN DESCRIPTION - FREQUENCY: FREQUENCY: CONTINUOUS

## 2017-03-18 ENCOUNTER — APPOINTMENT (OUTPATIENT)
Dept: GENERAL RADIOLOGY | Age: 42
DRG: 138 | End: 2017-03-18
Payer: OTHER GOVERNMENT

## 2017-03-18 VITALS
HEART RATE: 67 BPM | TEMPERATURE: 98.2 F | SYSTOLIC BLOOD PRESSURE: 166 MMHG | WEIGHT: 202 LBS | BODY MASS INDEX: 35.79 KG/M2 | OXYGEN SATURATION: 95 % | DIASTOLIC BLOOD PRESSURE: 93 MMHG | RESPIRATION RATE: 16 BRPM | HEIGHT: 63 IN

## 2017-03-18 LAB
MYOGLOBIN: 116 NG/ML (ref 25–58)
TROPONIN INTERP: ABNORMAL
TROPONIN T: <0.03 NG/ML

## 2017-03-18 PROCEDURE — 2580000003 HC RX 258: Performed by: EMERGENCY MEDICINE

## 2017-03-18 PROCEDURE — 6370000000 HC RX 637 (ALT 250 FOR IP): Performed by: EMERGENCY MEDICINE

## 2017-03-18 PROCEDURE — 84484 ASSAY OF TROPONIN QUANT: CPT

## 2017-03-18 PROCEDURE — 96376 TX/PRO/DX INJ SAME DRUG ADON: CPT

## 2017-03-18 PROCEDURE — 96366 THER/PROPH/DIAG IV INF ADDON: CPT

## 2017-03-18 PROCEDURE — 71010 XR CHEST LIMITED: CPT

## 2017-03-18 PROCEDURE — 6360000002 HC RX W HCPCS: Performed by: EMERGENCY MEDICINE

## 2017-03-18 PROCEDURE — 6360000002 HC RX W HCPCS: Performed by: STUDENT IN AN ORGANIZED HEALTH CARE EDUCATION/TRAINING PROGRAM

## 2017-03-18 PROCEDURE — 2580000003 HC RX 258: Performed by: STUDENT IN AN ORGANIZED HEALTH CARE EDUCATION/TRAINING PROGRAM

## 2017-03-18 PROCEDURE — 2500000003 HC RX 250 WO HCPCS: Performed by: STUDENT IN AN ORGANIZED HEALTH CARE EDUCATION/TRAINING PROGRAM

## 2017-03-18 PROCEDURE — 83874 ASSAY OF MYOGLOBIN: CPT

## 2017-03-18 PROCEDURE — 36415 COLL VENOUS BLD VENIPUNCTURE: CPT

## 2017-03-18 PROCEDURE — G0378 HOSPITAL OBSERVATION PER HR: HCPCS

## 2017-03-18 PROCEDURE — 93005 ELECTROCARDIOGRAM TRACING: CPT

## 2017-03-18 RX ORDER — OXYCODONE HCL 5 MG/5 ML
5 SOLUTION, ORAL ORAL EVERY 4 HOURS PRN
Status: DISCONTINUED | OUTPATIENT
Start: 2017-03-18 | End: 2017-03-18 | Stop reason: HOSPADM

## 2017-03-18 RX ORDER — HYDROCODONE BITARTRATE AND ACETAMINOPHEN 5; 325 MG/1; MG/1
1 TABLET ORAL EVERY 6 HOURS PRN
Qty: 20 TABLET | Refills: 0 | Status: SHIPPED | OUTPATIENT
Start: 2017-03-18 | End: 2017-03-25

## 2017-03-18 RX ORDER — IBUPROFEN 600 MG/1
600 TABLET ORAL EVERY 6 HOURS PRN
Qty: 30 TABLET | Refills: 0 | Status: SHIPPED | OUTPATIENT
Start: 2017-03-18 | End: 2019-05-19

## 2017-03-18 RX ORDER — AMOXICILLIN AND CLAVULANATE POTASSIUM 875; 125 MG/1; MG/1
1 TABLET, FILM COATED ORAL 2 TIMES DAILY
Qty: 20 TABLET | Refills: 0 | Status: SHIPPED | OUTPATIENT
Start: 2017-03-18 | End: 2017-03-28

## 2017-03-18 RX ADMIN — OXYCODONE HYDROCHLORIDE 5 MG: 5 SOLUTION ORAL at 10:31

## 2017-03-18 RX ADMIN — SODIUM CHLORIDE 3 G: 900 INJECTION INTRAVENOUS at 07:50

## 2017-03-18 RX ADMIN — MORPHINE SULFATE 4 MG: 4 INJECTION, SOLUTION INTRAMUSCULAR; INTRAVENOUS at 04:21

## 2017-03-18 RX ADMIN — SODIUM CHLORIDE: 9 INJECTION, SOLUTION INTRAVENOUS at 00:17

## 2017-03-18 RX ADMIN — MORPHINE SULFATE 4 MG: 4 INJECTION, SOLUTION INTRAMUSCULAR; INTRAVENOUS at 01:49

## 2017-03-18 RX ADMIN — FOLIC ACID: 5 INJECTION, SOLUTION INTRAMUSCULAR; INTRAVENOUS; SUBCUTANEOUS at 09:03

## 2017-03-18 RX ADMIN — MORPHINE SULFATE 4 MG: 4 INJECTION, SOLUTION INTRAMUSCULAR; INTRAVENOUS at 07:39

## 2017-03-18 RX ADMIN — SODIUM CHLORIDE 3 G: 900 INJECTION INTRAVENOUS at 01:44

## 2017-03-18 ASSESSMENT — PAIN SCALES - GENERAL
PAINLEVEL_OUTOF10: 8
PAINLEVEL_OUTOF10: 8
PAINLEVEL_OUTOF10: 6
PAINLEVEL_OUTOF10: 10
PAINLEVEL_OUTOF10: 6
PAINLEVEL_OUTOF10: 6
PAINLEVEL_OUTOF10: 5
PAINLEVEL_OUTOF10: 4
PAINLEVEL_OUTOF10: 5
PAINLEVEL_OUTOF10: 8

## 2017-03-20 LAB
EKG ATRIAL RATE: 67 BPM
EKG P AXIS: 56 DEGREES
EKG P-R INTERVAL: 154 MS
EKG Q-T INTERVAL: 384 MS
EKG QRS DURATION: 86 MS
EKG QTC CALCULATION (BAZETT): 405 MS
EKG R AXIS: 42 DEGREES
EKG T AXIS: 33 DEGREES
EKG VENTRICULAR RATE: 67 BPM

## 2017-03-23 ENCOUNTER — TELEPHONE (OUTPATIENT)
Dept: SURGERY | Age: 42
End: 2017-03-23

## 2017-05-22 ENCOUNTER — HOSPITAL ENCOUNTER (EMERGENCY)
Age: 42
Discharge: HOME | End: 2017-05-22
Attending: EMERGENCY MEDICINE
Payer: OTHER GOVERNMENT

## 2017-05-22 VITALS
HEART RATE: 76 BPM | DIASTOLIC BLOOD PRESSURE: 94 MMHG | RESPIRATION RATE: 16 BRPM | TEMPERATURE: 97.8 F | WEIGHT: 200 LBS | OXYGEN SATURATION: 98 % | SYSTOLIC BLOOD PRESSURE: 136 MMHG | HEIGHT: 63 IN | BODY MASS INDEX: 35.44 KG/M2

## 2017-05-22 DIAGNOSIS — R52 BODY ACHES: Primary | ICD-10-CM

## 2017-05-22 LAB
-: ABNORMAL
ABSOLUTE EOS #: 0.2 K/UL (ref 0–0.4)
ABSOLUTE LYMPH #: 2.7 K/UL (ref 1–4.8)
ABSOLUTE MONO #: 0.4 K/UL (ref 0.1–1.2)
AMORPHOUS: ABNORMAL
ANION GAP SERPL CALCULATED.3IONS-SCNC: 16 MMOL/L (ref 9–17)
BACTERIA: ABNORMAL
BASOPHILS # BLD: 1 %
BASOPHILS ABSOLUTE: 0 K/UL (ref 0–0.2)
BILIRUBIN URINE: NEGATIVE
BUN BLDV-MCNC: 13 MG/DL (ref 6–20)
BUN/CREAT BLD: ABNORMAL (ref 9–20)
CALCIUM SERPL-MCNC: 9.6 MG/DL (ref 8.6–10.4)
CASTS UA: ABNORMAL /LPF (ref 0–8)
CHLORIDE BLD-SCNC: 95 MMOL/L (ref 98–107)
CO2: 22 MMOL/L (ref 20–31)
COLOR: ABNORMAL
CREAT SERPL-MCNC: 0.65 MG/DL (ref 0.5–0.9)
CRYSTALS, UA: ABNORMAL /HPF
DIFFERENTIAL TYPE: NORMAL
EOSINOPHILS RELATIVE PERCENT: 2 %
EPITHELIAL CELLS UA: ABNORMAL /HPF (ref 0–5)
GFR AFRICAN AMERICAN: >60 ML/MIN
GFR NON-AFRICAN AMERICAN: >60 ML/MIN
GFR SERPL CREATININE-BSD FRML MDRD: ABNORMAL ML/MIN/{1.73_M2}
GFR SERPL CREATININE-BSD FRML MDRD: ABNORMAL ML/MIN/{1.73_M2}
GLUCOSE BLD-MCNC: 97 MG/DL (ref 70–99)
GLUCOSE URINE: NEGATIVE
HCT VFR BLD CALC: 43.9 % (ref 36–46)
HEMOGLOBIN: 14.9 G/DL (ref 12–16)
KETONES, URINE: ABNORMAL
LEUKOCYTE ESTERASE, URINE: ABNORMAL
LYMPHOCYTES # BLD: 31 %
MCH RBC QN AUTO: 31.7 PG (ref 26–34)
MCHC RBC AUTO-ENTMCNC: 33.8 G/DL (ref 31–37)
MCV RBC AUTO: 93.7 FL (ref 80–100)
MONOCYTES # BLD: 5 %
MUCUS: ABNORMAL
NITRITE, URINE: NEGATIVE
OTHER OBSERVATIONS UA: ABNORMAL
PDW BLD-RTO: 13.4 % (ref 12.5–15.4)
PH UA: 5.5 (ref 5–8)
PLATELET # BLD: 307 K/UL (ref 140–450)
PLATELET ESTIMATE: NORMAL
PMV BLD AUTO: 7.6 FL (ref 6–12)
POTASSIUM SERPL-SCNC: 4.7 MMOL/L (ref 3.7–5.3)
PROTEIN UA: NEGATIVE
RBC # BLD: 4.69 M/UL (ref 4–5.2)
RBC # BLD: NORMAL 10*6/UL
RBC UA: ABNORMAL /HPF (ref 0–4)
RENAL EPITHELIAL, UA: ABNORMAL /HPF
SEG NEUTROPHILS: 61 %
SEGMENTED NEUTROPHILS ABSOLUTE COUNT: 5.2 K/UL (ref 1.8–7.7)
SODIUM BLD-SCNC: 133 MMOL/L (ref 135–144)
SPECIFIC GRAVITY UA: 1.02 (ref 1–1.03)
TRICHOMONAS: ABNORMAL
TURBIDITY: CLEAR
URINE HGB: NEGATIVE
UROBILINOGEN, URINE: NORMAL
WBC # BLD: 8.6 K/UL (ref 3.5–11)
WBC # BLD: NORMAL 10*3/UL
WBC UA: ABNORMAL /HPF (ref 0–5)
YEAST: ABNORMAL

## 2017-05-22 PROCEDURE — G0383 LEV 4 HOSP TYPE B ED VISIT: HCPCS

## 2017-05-22 PROCEDURE — 85025 COMPLETE CBC W/AUTO DIFF WBC: CPT

## 2017-05-22 PROCEDURE — 80048 BASIC METABOLIC PNL TOTAL CA: CPT

## 2017-05-22 PROCEDURE — 6370000000 HC RX 637 (ALT 250 FOR IP): Performed by: EMERGENCY MEDICINE

## 2017-05-22 PROCEDURE — 81001 URINALYSIS AUTO W/SCOPE: CPT

## 2017-05-22 RX ORDER — IBUPROFEN 400 MG/1
200 TABLET ORAL ONCE
Status: COMPLETED | OUTPATIENT
Start: 2017-05-22 | End: 2017-05-22

## 2017-05-22 RX ADMIN — IBUPROFEN 200 MG: 400 TABLET ORAL at 12:57

## 2017-05-22 ASSESSMENT — PAIN SCALES - GENERAL
PAINLEVEL_OUTOF10: 9
PAINLEVEL_OUTOF10: 9

## 2017-05-22 ASSESSMENT — ENCOUNTER SYMPTOMS
ABDOMINAL PAIN: 0
VOMITING: 0
DIARRHEA: 0
PHOTOPHOBIA: 0
COUGH: 0
CONSTIPATION: 0
BACK PAIN: 1
NAUSEA: 0
CHEST TIGHTNESS: 0
SHORTNESS OF BREATH: 0

## 2017-05-22 ASSESSMENT — PAIN DESCRIPTION - LOCATION: LOCATION: GENERALIZED

## 2017-05-22 ASSESSMENT — PAIN DESCRIPTION - PAIN TYPE: TYPE: ACUTE PAIN

## 2017-05-22 ASSESSMENT — PAIN DESCRIPTION - FREQUENCY: FREQUENCY: CONTINUOUS

## 2017-05-22 ASSESSMENT — PAIN DESCRIPTION - PROGRESSION: CLINICAL_PROGRESSION: NOT CHANGED

## 2017-05-22 ASSESSMENT — PAIN DESCRIPTION - DESCRIPTORS: DESCRIPTORS: CONSTANT

## 2017-06-09 ENCOUNTER — APPOINTMENT (OUTPATIENT)
Dept: GENERAL RADIOLOGY | Age: 42
End: 2017-06-09
Payer: OTHER GOVERNMENT

## 2017-06-09 ENCOUNTER — HOSPITAL ENCOUNTER (EMERGENCY)
Age: 42
Discharge: HOME | End: 2017-06-09
Attending: EMERGENCY MEDICINE
Payer: OTHER GOVERNMENT

## 2017-06-09 VITALS
OXYGEN SATURATION: 97 % | RESPIRATION RATE: 18 BRPM | WEIGHT: 200 LBS | DIASTOLIC BLOOD PRESSURE: 81 MMHG | BODY MASS INDEX: 35.44 KG/M2 | HEIGHT: 63 IN | TEMPERATURE: 97.8 F | SYSTOLIC BLOOD PRESSURE: 113 MMHG | HEART RATE: 86 BPM

## 2017-06-09 DIAGNOSIS — J40 BRONCHITIS: Primary | ICD-10-CM

## 2017-06-09 DIAGNOSIS — R11.10 POST-TUSSIVE EMESIS: ICD-10-CM

## 2017-06-09 LAB
ABSOLUTE EOS #: 0.1 K/UL (ref 0–0.4)
ABSOLUTE LYMPH #: 1.8 K/UL (ref 1–4.8)
ABSOLUTE MONO #: 0.3 K/UL (ref 0.1–1.3)
ALBUMIN SERPL-MCNC: 4.2 G/DL (ref 3.5–5.2)
ALBUMIN/GLOBULIN RATIO: ABNORMAL (ref 1–2.5)
ALP BLD-CCNC: 78 U/L (ref 35–104)
ALT SERPL-CCNC: 33 U/L (ref 5–33)
ANION GAP SERPL CALCULATED.3IONS-SCNC: 14 MMOL/L (ref 9–17)
AST SERPL-CCNC: 35 U/L
BASOPHILS # BLD: 1 %
BASOPHILS ABSOLUTE: 0 K/UL (ref 0–0.2)
BILIRUB SERPL-MCNC: 0.62 MG/DL (ref 0.3–1.2)
BILIRUBIN DIRECT: 0.16 MG/DL
BILIRUBIN, INDIRECT: 0.46 MG/DL (ref 0–1)
BUN BLDV-MCNC: 11 MG/DL (ref 6–20)
BUN/CREAT BLD: NORMAL (ref 9–20)
CALCIUM SERPL-MCNC: 8.9 MG/DL (ref 8.6–10.4)
CHLORIDE BLD-SCNC: 99 MMOL/L (ref 98–107)
CO2: 23 MMOL/L (ref 20–31)
CREAT SERPL-MCNC: 0.7 MG/DL (ref 0.5–0.9)
DIFFERENTIAL TYPE: NORMAL
EOSINOPHILS RELATIVE PERCENT: 3 %
GFR AFRICAN AMERICAN: >60 ML/MIN
GFR NON-AFRICAN AMERICAN: >60 ML/MIN
GFR SERPL CREATININE-BSD FRML MDRD: NORMAL ML/MIN/{1.73_M2}
GFR SERPL CREATININE-BSD FRML MDRD: NORMAL ML/MIN/{1.73_M2}
GLOBULIN: ABNORMAL G/DL (ref 1.5–3.8)
GLUCOSE BLD-MCNC: 94 MG/DL (ref 70–99)
HCT VFR BLD CALC: 41.3 % (ref 36–46)
HEMOGLOBIN: 13.9 G/DL (ref 12–16)
LIPASE: 48 U/L (ref 13–60)
LYMPHOCYTES # BLD: 37 %
MAGNESIUM: 2.1 MG/DL (ref 1.6–2.6)
MCH RBC QN AUTO: 31.9 PG (ref 26–34)
MCHC RBC AUTO-ENTMCNC: 33.7 G/DL (ref 31–37)
MCV RBC AUTO: 94.8 FL (ref 80–100)
MONOCYTES # BLD: 6 %
PDW BLD-RTO: 14.6 % (ref 11.5–14.9)
PLATELET # BLD: 235 K/UL (ref 150–450)
PLATELET ESTIMATE: NORMAL
PMV BLD AUTO: 7.4 FL (ref 6–12)
POTASSIUM SERPL-SCNC: 4.1 MMOL/L (ref 3.7–5.3)
RBC # BLD: 4.35 M/UL (ref 4–5.2)
RBC # BLD: NORMAL 10*6/UL
SEG NEUTROPHILS: 53 %
SEGMENTED NEUTROPHILS ABSOLUTE COUNT: 2.6 K/UL (ref 1.3–9.1)
SODIUM BLD-SCNC: 136 MMOL/L (ref 135–144)
TOTAL PROTEIN: 7.2 G/DL (ref 6.4–8.3)
TROPONIN INTERP: NORMAL
TROPONIN T: <0.03 NG/ML
WBC # BLD: 4.8 K/UL (ref 3.5–11)
WBC # BLD: NORMAL 10*3/UL

## 2017-06-09 PROCEDURE — 83735 ASSAY OF MAGNESIUM: CPT

## 2017-06-09 PROCEDURE — 80048 BASIC METABOLIC PNL TOTAL CA: CPT

## 2017-06-09 PROCEDURE — 6370000000 HC RX 637 (ALT 250 FOR IP): Performed by: EMERGENCY MEDICINE

## 2017-06-09 PROCEDURE — 71020 XR CHEST STANDARD TWO VW: CPT

## 2017-06-09 PROCEDURE — 36415 COLL VENOUS BLD VENIPUNCTURE: CPT

## 2017-06-09 PROCEDURE — 83690 ASSAY OF LIPASE: CPT

## 2017-06-09 PROCEDURE — 85025 COMPLETE CBC W/AUTO DIFF WBC: CPT

## 2017-06-09 PROCEDURE — 80076 HEPATIC FUNCTION PANEL: CPT

## 2017-06-09 PROCEDURE — 99285 EMERGENCY DEPT VISIT HI MDM: CPT

## 2017-06-09 PROCEDURE — 84484 ASSAY OF TROPONIN QUANT: CPT

## 2017-06-09 PROCEDURE — 93005 ELECTROCARDIOGRAM TRACING: CPT

## 2017-06-09 RX ORDER — HYDROCODONE BITARTRATE AND ACETAMINOPHEN 5; 325 MG/1; MG/1
2 TABLET ORAL ONCE
Status: COMPLETED | OUTPATIENT
Start: 2017-06-09 | End: 2017-06-09

## 2017-06-09 RX ORDER — GUAIFENESIN/DEXTROMETHORPHAN 100-10MG/5
5 SYRUP ORAL 3 TIMES DAILY PRN
Qty: 120 ML | Refills: 0 | Status: SHIPPED | OUTPATIENT
Start: 2017-06-09 | End: 2017-06-19

## 2017-06-09 RX ORDER — IBUPROFEN 600 MG/1
600 TABLET ORAL EVERY 6 HOURS PRN
Qty: 30 TABLET | Refills: 0 | Status: SHIPPED | OUTPATIENT
Start: 2017-06-09 | End: 2019-05-19

## 2017-06-09 RX ORDER — ALBUTEROL SULFATE 90 UG/1
2 AEROSOL, METERED RESPIRATORY (INHALATION) ONCE
Status: COMPLETED | OUTPATIENT
Start: 2017-06-09 | End: 2017-06-09

## 2017-06-09 RX ORDER — ALBUTEROL SULFATE 90 UG/1
1-2 AEROSOL, METERED RESPIRATORY (INHALATION) EVERY 4 HOURS PRN
Qty: 1 INHALER | Refills: 0 | Status: ON HOLD | OUTPATIENT
Start: 2017-06-09 | End: 2019-05-21 | Stop reason: HOSPADM

## 2017-06-09 RX ADMIN — ALBUTEROL SULFATE 2 PUFF: 90 AEROSOL, METERED RESPIRATORY (INHALATION) at 14:53

## 2017-06-09 RX ADMIN — HYDROCODONE BITARTRATE AND ACETAMINOPHEN 2 TABLET: 5; 325 TABLET ORAL at 13:28

## 2017-06-09 ASSESSMENT — ENCOUNTER SYMPTOMS
NAUSEA: 1
SORE THROAT: 1
ABDOMINAL PAIN: 1
CONSTIPATION: 0
SHORTNESS OF BREATH: 1
COUGH: 1
DIARRHEA: 0
VOMITING: 1

## 2017-06-09 ASSESSMENT — PAIN SCALES - GENERAL
PAINLEVEL_OUTOF10: 8
PAINLEVEL_OUTOF10: 8

## 2017-06-13 LAB
EKG ATRIAL RATE: 71 BPM
EKG P AXIS: 56 DEGREES
EKG P-R INTERVAL: 152 MS
EKG Q-T INTERVAL: 388 MS
EKG QRS DURATION: 68 MS
EKG QTC CALCULATION (BAZETT): 421 MS
EKG R AXIS: 53 DEGREES
EKG T AXIS: 41 DEGREES
EKG VENTRICULAR RATE: 71 BPM

## 2017-10-29 ENCOUNTER — HOSPITAL ENCOUNTER (EMERGENCY)
Age: 42
Discharge: HOME | End: 2017-10-29
Attending: EMERGENCY MEDICINE
Payer: OTHER GOVERNMENT

## 2017-10-29 ENCOUNTER — APPOINTMENT (OUTPATIENT)
Dept: CT IMAGING | Age: 42
End: 2017-10-29
Payer: OTHER GOVERNMENT

## 2017-10-29 ENCOUNTER — APPOINTMENT (OUTPATIENT)
Dept: GENERAL RADIOLOGY | Age: 42
End: 2017-10-29
Payer: OTHER GOVERNMENT

## 2017-10-29 VITALS
BODY MASS INDEX: 26.05 KG/M2 | WEIGHT: 147 LBS | DIASTOLIC BLOOD PRESSURE: 85 MMHG | HEART RATE: 99 BPM | HEIGHT: 63 IN | TEMPERATURE: 98.1 F | OXYGEN SATURATION: 95 % | RESPIRATION RATE: 16 BRPM | SYSTOLIC BLOOD PRESSURE: 132 MMHG

## 2017-10-29 DIAGNOSIS — M79.641 PAIN IN BOTH HANDS: ICD-10-CM
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
## 2017-10-29 DIAGNOSIS — M79.642 PAIN IN BOTH HANDS: ICD-10-CM

## 2017-10-29 DIAGNOSIS — S00.03XS CONTUSION OF SCALP, SEQUELA: ICD-10-CM

## 2017-10-29 DIAGNOSIS — I10 HYPERTENSION, UNSPECIFIED TYPE: ICD-10-CM

## 2017-10-29 DIAGNOSIS — F10.920 ALCOHOLIC INTOXICATION WITHOUT COMPLICATION (HCC): ICD-10-CM

## 2017-10-29 PROCEDURE — 73130 X-RAY EXAM OF HAND: CPT

## 2017-10-29 PROCEDURE — 6370000000 HC RX 637 (ALT 250 FOR IP): Performed by: EMERGENCY MEDICINE

## 2017-10-29 PROCEDURE — 72125 CT NECK SPINE W/O DYE: CPT

## 2017-10-29 PROCEDURE — 70450 CT HEAD/BRAIN W/O DYE: CPT

## 2017-10-29 PROCEDURE — 99284 EMERGENCY DEPT VISIT MOD MDM: CPT

## 2017-10-29 RX ORDER — ACETAMINOPHEN 325 MG/1
650 TABLET ORAL ONCE
Status: COMPLETED | OUTPATIENT
Start: 2017-10-29 | End: 2017-10-29

## 2017-10-29 RX ORDER — IBUPROFEN 600 MG/1
600 TABLET ORAL ONCE
Status: COMPLETED | OUTPATIENT
Start: 2017-10-29 | End: 2017-10-29

## 2017-10-29 RX ADMIN — IBUPROFEN 600 MG: 600 TABLET, FILM COATED ORAL at 20:11

## 2017-10-29 RX ADMIN — ACETAMINOPHEN 650 MG: 325 TABLET ORAL at 20:53

## 2017-10-29 ASSESSMENT — PAIN DESCRIPTION - PAIN TYPE: TYPE: ACUTE PAIN

## 2017-10-29 ASSESSMENT — PAIN DESCRIPTION - LOCATION: LOCATION: HEAD

## 2017-10-29 ASSESSMENT — PAIN SCALES - GENERAL
PAINLEVEL_OUTOF10: 9
PAINLEVEL_OUTOF10: 10
PAINLEVEL_OUTOF10: 10

## 2017-10-29 NOTE — ED NOTES
Pt had to use the bathroom, bedpan provided. Urine sample obtained from bedpan and sent to lab.  Pt continues to be awake, alert and oriented            Merrell Sicard, RN  10/29/17 5543

## 2017-10-29 NOTE — ED PROVIDER NOTES
[address]  Emergency Department Encounter  Emergency Medicine Resident     Pt Name: John Coppola  MRN: [id number]  [place] 1975  Date of evaluation: 10/29/17  PCP:  No primary care provider on file. CHIEF COMPLAINT       Chief Complaint   Patient presents with    Fall     hit head, right side forehead       HISTORY OF PRESENT ILLNESS  (Location/Symptom, Timing/Onset, Context/Setting, Quality, Duration, Modifying Factors, Severity.)      John Coppola is a 38 y.o. female who presents with an acute mechanical fall that happened shortly pta. Patient states that they tripped into a window sill causing them to hit head. Denied loss of consciousness, chest pain, shortness of breath, palpitations, nausea, vomiting, numbness, tingling, burning, weakness, headaches, blurred vision, being on blood thinners. Since the fall patient complains of  sharp pain in b/l hands despite not falling on her hands that is has a constant baseline component with acute intermittent flares is aggravated by pressure or movement and relieved by nothing. Patient has taken nothing without adequate relief. Patient has ambulated. PAST MEDICAL / SURGICAL / SOCIAL / FAMILY HISTORY      has a past medical history of Asthma; Chronic kidney disease; COPD (chronic obstructive pulmonary disease) ([facility] [address] Hypertension; and Liver disease. has a past surgical history that includes excision of bone, lower jaw (N/A, 3/17/2017). Social History     Social History    Marital status:      Spouse name: N/A    Number of children: N/A    Years of education: N/A     Occupational History    Not on file.      Social History Main Topics    Smoking status: Current Every Day Smoker     Packs/day: 1.00     Years: 15.00     Types: Cigarettes    Smokeless tobacco: Not on file      Comment: gum ordered    Alcohol use Yes      Comment: drinks frequently    Drug use:      Types: Marijuana    Sexual activity: Not on file     Other Topics Concern    Not on file     Social History Narrative    No narrative on file       History reviewed. No pertinent family history. Allergies:  Tramadol    Home Medications:  Prior to Admission medications    Medication Sig Start Date End Date Taking?  Authorizing Provider   albuterol sulfate HFA (PROVENTIL HFA) 108 (90 BASE) MCG/ACT inhaler Inhale 1-2 puffs into the lungs every 4 hours as needed for Wheezing 6/9/17  Yes Angeles Rutledge MD   albuterol sulfate HFA (PROVENTIL HFA) 108 (90 BASE) MCG/ACT inhaler Inhale 1-2 puffs into the lungs every 4 hours as needed for Wheezing 11/15/16  Yes Tello Ramos MD   ibuprofen (ADVIL;MOTRIN) 600 MG tablet Take 1 tablet by mouth every 6 hours as needed for Pain 6/9/17   Angeles Rutledge MD   ibuprofen (ADVIL;MOTRIN) 600 MG tablet Take 1 tablet by mouth every 6 hours as needed for Pain 3/18/17   Stephie Valdes DO   mirtazapine (REMERON) 15 MG tablet Take 1 tablet by mouth nightly 3/10/17   Bouchra Louis MD   OLANZapine (ZYPREXA) 5 MG tablet Take 1 tablet by mouth nightly 3/10/17   Bouchra Louis MD       REVIEW OF SYSTEMS    (2-9 systems for level 4, 10 or more for level 5)      ROS:  Constitutional: Denied fever, weight loss  Eyes: Denied change in vision, eye pain  ENT: Denied sinus problems, congestion/obstruction  Respiratory: Denies shortness of breath, chest pain upon inspiration  CV: Denied edema, chest pain, palpitations  GI: Denies nausea, vomiting, constipation, diarrhea, change in stools   : Denied Change in urination, hematuria,   MS: +muscle stiffness,negative arthritis  Pscyh:Denies depression and hallucinations  Neuro: Denies weakness, headaches and seizures  Endocrine Denies polyphagia, polydipsia,   Hematological/lympathic: Denies lymphadenopathy, bleeds easily  Integumentary:Denies skin changes, rashes    PHYSICAL EXAM   (up to 7 for level 4, 8 or more for level 5)      INITIAL VITALS:   /85   Pulse 99   Temp 98.1 °F (36.7 °C) [facility] [address]  [phone]    If symptoms worsen nausea vomitting    see list this week            DISCHARGE MEDICATIONS:  Discharge Medication List as of 10/29/2017  9:57 Jesus Holm MD  Emergency Medicine Resident    (Please note that portions of this note were completed with a voice recognition program.  Efforts were made to edit the dictations but occasionally words are mis-transcribed.)        Tricia Rodrigues MD  Resident  10/30/17 5810

## 2017-10-30 NOTE — ED NOTES
Pt discharged to [facility] to await on cab ride home. Pt talking in clear logical sentences. Pt denies questions, pt ambulatory out of department with steady gait.      Glo Romero RN  10/29/17 3825

## 2019-05-19 ENCOUNTER — HOSPITAL ENCOUNTER (INPATIENT)
Age: 44
LOS: 2 days | Discharge: HOME | DRG: 192 | End: 2019-05-21
Attending: EMERGENCY MEDICINE | Admitting: INTERNAL MEDICINE
Payer: OTHER GOVERNMENT

## 2019-05-19 ENCOUNTER — APPOINTMENT (OUTPATIENT)
Dept: GENERAL RADIOLOGY | Age: 44
DRG: 192 | End: 2019-05-19
Payer: OTHER GOVERNMENT

## 2019-05-19 DIAGNOSIS — J44.1 COPD EXACERBATION (HCC): Primary | ICD-10-CM

## 2019-05-19 DIAGNOSIS — M79.675 TOE PAIN, LEFT: ICD-10-CM

## 2019-05-19 PROBLEM — Z91.199 NON-COMPLIANCE: Status: ACTIVE | Noted: 2019-05-19

## 2019-05-19 PROBLEM — F17.210 CIGARETTE NICOTINE DEPENDENCE WITHOUT COMPLICATION: Status: ACTIVE | Noted: 2019-05-19

## 2019-05-19 PROBLEM — E66.9 OBESITY: Status: ACTIVE | Noted: 2019-05-19

## 2019-05-19 PROBLEM — F17.200 SMOKER: Status: ACTIVE | Noted: 2019-05-19

## 2019-05-19 PROBLEM — I10 ESSENTIAL HYPERTENSION: Status: ACTIVE | Noted: 2019-05-19

## 2019-05-19 PROBLEM — F10.10 ALCOHOL ABUSE: Status: ACTIVE | Noted: 2019-05-19

## 2019-05-19 LAB
ABSOLUTE EOS #: 0.22 K/UL (ref 0–0.44)
ABSOLUTE IMMATURE GRANULOCYTE: <0.03 K/UL (ref 0–0.3)
ABSOLUTE LYMPH #: 1.79 K/UL (ref 1.1–3.7)
ABSOLUTE MONO #: 0.56 K/UL (ref 0.1–1.2)
ACETAMINOPHEN LEVEL: <5 UG/ML (ref 10–30)
ANION GAP SERPL CALCULATED.3IONS-SCNC: 14 MMOL/L (ref 9–17)
BASOPHILS # BLD: 1 % (ref 0–2)
BASOPHILS ABSOLUTE: 0.04 K/UL (ref 0–0.2)
BNP INTERPRETATION: NORMAL
BUN BLDV-MCNC: 9 MG/DL (ref 6–20)
BUN/CREAT BLD: ABNORMAL (ref 9–20)
CALCIUM SERPL-MCNC: 9.7 MG/DL (ref 8.6–10.4)
CHLORIDE BLD-SCNC: 91 MMOL/L (ref 98–107)
CO2: 26 MMOL/L (ref 20–31)
CREAT SERPL-MCNC: 0.77 MG/DL (ref 0.5–0.9)
DIFFERENTIAL TYPE: ABNORMAL
EOSINOPHILS RELATIVE PERCENT: 3 % (ref 1–4)
ETHANOL PERCENT: <0.01 %
ETHANOL: <10 MG/DL
GFR AFRICAN AMERICAN: >60 ML/MIN
GFR NON-AFRICAN AMERICAN: >60 ML/MIN
GFR SERPL CREATININE-BSD FRML MDRD: ABNORMAL ML/MIN/{1.73_M2}
GFR SERPL CREATININE-BSD FRML MDRD: ABNORMAL ML/MIN/{1.73_M2}
GLUCOSE BLD-MCNC: 316 MG/DL (ref 65–105)
GLUCOSE BLD-MCNC: 82 MG/DL (ref 70–99)
HCT VFR BLD CALC: 46 % (ref 36.3–47.1)
HEMOGLOBIN: 15.7 G/DL (ref 11.9–15.1)
IMMATURE GRANULOCYTES: 0 %
LACTIC ACID, WHOLE BLOOD: 4.9 MMOL/L (ref 0.7–2.1)
LYMPHOCYTES # BLD: 25 % (ref 24–43)
MCH RBC QN AUTO: 33.3 PG (ref 25.2–33.5)
MCHC RBC AUTO-ENTMCNC: 34.1 G/DL (ref 28.4–34.8)
MCV RBC AUTO: 97.5 FL (ref 82.6–102.9)
MONOCYTES # BLD: 8 % (ref 3–12)
NRBC AUTOMATED: 0 PER 100 WBC
PDW BLD-RTO: 14.3 % (ref 11.8–14.4)
PLATELET # BLD: 261 K/UL (ref 138–453)
PLATELET ESTIMATE: ABNORMAL
PMV BLD AUTO: 8.9 FL (ref 8.1–13.5)
POTASSIUM SERPL-SCNC: 4.4 MMOL/L (ref 3.7–5.3)
PRO-BNP: <20 PG/ML
RBC # BLD: 4.72 M/UL (ref 3.95–5.11)
RBC # BLD: ABNORMAL 10*6/UL
SALICYLATE LEVEL: <1 MG/DL (ref 3–10)
SEG NEUTROPHILS: 63 % (ref 36–65)
SEGMENTED NEUTROPHILS ABSOLUTE COUNT: 4.48 K/UL (ref 1.5–8.1)
SODIUM BLD-SCNC: 131 MMOL/L (ref 135–144)
TOXIC TRICYCLIC SC,BLOOD: NEGATIVE
TROPONIN INTERP: ABNORMAL
TROPONIN INTERP: ABNORMAL
TROPONIN T: ABNORMAL NG/ML
TROPONIN T: ABNORMAL NG/ML
TROPONIN, HIGH SENSITIVITY: 19 NG/L (ref 0–14)
TROPONIN, HIGH SENSITIVITY: 20 NG/L (ref 0–14)
WBC # BLD: 7.1 K/UL (ref 3.5–11.3)
WBC # BLD: ABNORMAL 10*3/UL

## 2019-05-19 PROCEDURE — 93005 ELECTROCARDIOGRAM TRACING: CPT

## 2019-05-19 PROCEDURE — 80307 DRUG TEST PRSMV CHEM ANLYZR: CPT

## 2019-05-19 PROCEDURE — 82947 ASSAY GLUCOSE BLOOD QUANT: CPT

## 2019-05-19 PROCEDURE — 6370000000 HC RX 637 (ALT 250 FOR IP): Performed by: STUDENT IN AN ORGANIZED HEALTH CARE EDUCATION/TRAINING PROGRAM

## 2019-05-19 PROCEDURE — 6370000000 HC RX 637 (ALT 250 FOR IP): Performed by: EMERGENCY MEDICINE

## 2019-05-19 PROCEDURE — 84484 ASSAY OF TROPONIN QUANT: CPT

## 2019-05-19 PROCEDURE — 83880 ASSAY OF NATRIURETIC PEPTIDE: CPT

## 2019-05-19 PROCEDURE — 1200000000 HC SEMI PRIVATE

## 2019-05-19 PROCEDURE — G0480 DRUG TEST DEF 1-7 CLASSES: HCPCS

## 2019-05-19 PROCEDURE — 99285 EMERGENCY DEPT VISIT HI MDM: CPT

## 2019-05-19 PROCEDURE — 83605 ASSAY OF LACTIC ACID: CPT

## 2019-05-19 PROCEDURE — 80048 BASIC METABOLIC PNL TOTAL CA: CPT

## 2019-05-19 PROCEDURE — 99223 1ST HOSP IP/OBS HIGH 75: CPT | Performed by: INTERNAL MEDICINE

## 2019-05-19 PROCEDURE — 2580000003 HC RX 258: Performed by: STUDENT IN AN ORGANIZED HEALTH CARE EDUCATION/TRAINING PROGRAM

## 2019-05-19 PROCEDURE — 94640 AIRWAY INHALATION TREATMENT: CPT

## 2019-05-19 PROCEDURE — 36415 COLL VENOUS BLD VENIPUNCTURE: CPT

## 2019-05-19 PROCEDURE — 71045 X-RAY EXAM CHEST 1 VIEW: CPT

## 2019-05-19 PROCEDURE — 6360000002 HC RX W HCPCS: Performed by: STUDENT IN AN ORGANIZED HEALTH CARE EDUCATION/TRAINING PROGRAM

## 2019-05-19 PROCEDURE — 94761 N-INVAS EAR/PLS OXIMETRY MLT: CPT

## 2019-05-19 PROCEDURE — 6360000002 HC RX W HCPCS: Performed by: EMERGENCY MEDICINE

## 2019-05-19 PROCEDURE — 85025 COMPLETE CBC W/AUTO DIFF WBC: CPT

## 2019-05-19 RX ORDER — SODIUM CHLORIDE 0.9 % (FLUSH) 0.9 %
10 SYRINGE (ML) INJECTION EVERY 12 HOURS SCHEDULED
Status: DISCONTINUED | OUTPATIENT
Start: 2019-05-19 | End: 2019-05-21 | Stop reason: HOSPADM

## 2019-05-19 RX ORDER — ACETAMINOPHEN 325 MG/1
650 TABLET ORAL EVERY 4 HOURS PRN
Status: DISCONTINUED | OUTPATIENT
Start: 2019-05-19 | End: 2019-05-21 | Stop reason: HOSPADM

## 2019-05-19 RX ORDER — ALBUTEROL SULFATE 2.5 MG/3ML
2.5 SOLUTION RESPIRATORY (INHALATION) EVERY 6 HOURS PRN
Status: DISCONTINUED | OUTPATIENT
Start: 2019-05-19 | End: 2019-05-21 | Stop reason: HOSPADM

## 2019-05-19 RX ORDER — METHYLPREDNISOLONE SODIUM SUCCINATE 125 MG/2ML
80 INJECTION, POWDER, LYOPHILIZED, FOR SOLUTION INTRAMUSCULAR; INTRAVENOUS EVERY 8 HOURS
Status: DISCONTINUED | OUTPATIENT
Start: 2019-05-19 | End: 2019-05-20

## 2019-05-19 RX ORDER — SODIUM CHLORIDE 0.9 % (FLUSH) 0.9 %
10 SYRINGE (ML) INJECTION PRN
Status: DISCONTINUED | OUTPATIENT
Start: 2019-05-19 | End: 2019-05-21 | Stop reason: HOSPADM

## 2019-05-19 RX ORDER — NICOTINE POLACRILEX 4 MG
15 LOZENGE BUCCAL PRN
Status: DISCONTINUED | OUTPATIENT
Start: 2019-05-19 | End: 2019-05-21 | Stop reason: HOSPADM

## 2019-05-19 RX ORDER — LEVOFLOXACIN 500 MG/1
500 TABLET, FILM COATED ORAL DAILY
Status: DISCONTINUED | OUTPATIENT
Start: 2019-05-20 | End: 2019-05-21 | Stop reason: HOSPADM

## 2019-05-19 RX ORDER — MIRTAZAPINE 15 MG/1
15 TABLET, FILM COATED ORAL NIGHTLY
Status: DISCONTINUED | OUTPATIENT
Start: 2019-05-19 | End: 2019-05-21 | Stop reason: HOSPADM

## 2019-05-19 RX ORDER — POTASSIUM CHLORIDE 20 MEQ/1
40 TABLET, EXTENDED RELEASE ORAL PRN
Status: DISCONTINUED | OUTPATIENT
Start: 2019-05-19 | End: 2019-05-21 | Stop reason: HOSPADM

## 2019-05-19 RX ORDER — ONDANSETRON 2 MG/ML
4 INJECTION INTRAMUSCULAR; INTRAVENOUS EVERY 6 HOURS PRN
Status: DISCONTINUED | OUTPATIENT
Start: 2019-05-19 | End: 2019-05-21 | Stop reason: HOSPADM

## 2019-05-19 RX ORDER — DEXTROSE MONOHYDRATE 25 G/50ML
12.5 INJECTION, SOLUTION INTRAVENOUS PRN
Status: DISCONTINUED | OUTPATIENT
Start: 2019-05-19 | End: 2019-05-21 | Stop reason: HOSPADM

## 2019-05-19 RX ORDER — PREDNISONE 20 MG/1
60 TABLET ORAL ONCE
Status: COMPLETED | OUTPATIENT
Start: 2019-05-19 | End: 2019-05-19

## 2019-05-19 RX ORDER — ASPIRIN 81 MG/1
324 TABLET, CHEWABLE ORAL ONCE
Status: COMPLETED | OUTPATIENT
Start: 2019-05-19 | End: 2019-05-19

## 2019-05-19 RX ORDER — SODIUM CHLORIDE 9 MG/ML
INJECTION, SOLUTION INTRAVENOUS CONTINUOUS
Status: DISCONTINUED | OUTPATIENT
Start: 2019-05-19 | End: 2019-05-20

## 2019-05-19 RX ORDER — IPRATROPIUM BROMIDE AND ALBUTEROL SULFATE 2.5; .5 MG/3ML; MG/3ML
1 SOLUTION RESPIRATORY (INHALATION)
Status: DISCONTINUED | OUTPATIENT
Start: 2019-05-20 | End: 2019-05-21 | Stop reason: HOSPADM

## 2019-05-19 RX ORDER — MAGNESIUM SULFATE 1 G/100ML
1 INJECTION INTRAVENOUS PRN
Status: DISCONTINUED | OUTPATIENT
Start: 2019-05-19 | End: 2019-05-21 | Stop reason: HOSPADM

## 2019-05-19 RX ORDER — OLANZAPINE 5 MG/1
5 TABLET ORAL NIGHTLY
Status: DISCONTINUED | OUTPATIENT
Start: 2019-05-19 | End: 2019-05-21 | Stop reason: HOSPADM

## 2019-05-19 RX ORDER — AMLODIPINE BESYLATE 5 MG/1
5 TABLET ORAL DAILY
Status: DISCONTINUED | OUTPATIENT
Start: 2019-05-20 | End: 2019-05-21 | Stop reason: HOSPADM

## 2019-05-19 RX ORDER — LEVOFLOXACIN 500 MG/1
500 TABLET, FILM COATED ORAL ONCE
Status: COMPLETED | OUTPATIENT
Start: 2019-05-19 | End: 2019-05-19

## 2019-05-19 RX ORDER — DEXTROSE MONOHYDRATE 50 MG/ML
100 INJECTION, SOLUTION INTRAVENOUS PRN
Status: DISCONTINUED | OUTPATIENT
Start: 2019-05-19 | End: 2019-05-21 | Stop reason: HOSPADM

## 2019-05-19 RX ORDER — ALBUTEROL SULFATE 2.5 MG/3ML
2.5 SOLUTION RESPIRATORY (INHALATION)
Status: DISCONTINUED | OUTPATIENT
Start: 2019-05-19 | End: 2019-05-21 | Stop reason: HOSPADM

## 2019-05-19 RX ORDER — POTASSIUM CHLORIDE 7.45 MG/ML
10 INJECTION INTRAVENOUS PRN
Status: DISCONTINUED | OUTPATIENT
Start: 2019-05-19 | End: 2019-05-21 | Stop reason: HOSPADM

## 2019-05-19 RX ORDER — FLUTICASONE PROPIONATE 50 MCG
2 SPRAY, SUSPENSION (ML) NASAL DAILY
Status: DISCONTINUED | OUTPATIENT
Start: 2019-05-20 | End: 2019-05-21 | Stop reason: HOSPADM

## 2019-05-19 RX ORDER — NICOTINE 21 MG/24HR
1 PATCH, TRANSDERMAL 24 HOURS TRANSDERMAL DAILY
Status: DISCONTINUED | OUTPATIENT
Start: 2019-05-20 | End: 2019-05-21 | Stop reason: HOSPADM

## 2019-05-19 RX ADMIN — LEVOFLOXACIN 500 MG: 500 TABLET, FILM COATED ORAL at 21:34

## 2019-05-19 RX ADMIN — MOMETASONE FUROATE AND FORMOTEROL FUMARATE DIHYDRATE 2 PUFF: 100; 5 AEROSOL RESPIRATORY (INHALATION) at 21:47

## 2019-05-19 RX ADMIN — ALBUTEROL SULFATE 5 MG: 2.5 SOLUTION RESPIRATORY (INHALATION) at 17:14

## 2019-05-19 RX ADMIN — Medication 10 ML: at 21:39

## 2019-05-19 RX ADMIN — METHYLPREDNISOLONE SODIUM SUCCINATE 80 MG: 125 INJECTION, POWDER, FOR SOLUTION INTRAMUSCULAR; INTRAVENOUS at 21:37

## 2019-05-19 RX ADMIN — SODIUM CHLORIDE: 9 INJECTION, SOLUTION INTRAVENOUS at 23:01

## 2019-05-19 RX ADMIN — PREDNISONE 60 MG: 20 TABLET ORAL at 16:25

## 2019-05-19 RX ADMIN — INSULIN LISPRO 4 UNITS: 100 INJECTION, SOLUTION INTRAVENOUS; SUBCUTANEOUS at 22:43

## 2019-05-19 RX ADMIN — ASPIRIN 81 MG 324 MG: 81 TABLET ORAL at 16:25

## 2019-05-19 RX ADMIN — ENOXAPARIN SODIUM 30 MG: 30 INJECTION SUBCUTANEOUS at 21:35

## 2019-05-19 RX ADMIN — ALBUTEROL SULFATE 5 MG: 2.5 SOLUTION RESPIRATORY (INHALATION) at 17:26

## 2019-05-19 RX ADMIN — ALBUTEROL SULFATE 5 MG: 2.5 SOLUTION RESPIRATORY (INHALATION) at 17:43

## 2019-05-19 ASSESSMENT — ENCOUNTER SYMPTOMS
ABDOMINAL PAIN: 0
RHINORRHEA: 0
SORE THROAT: 0
WHEEZING: 1
COUGH: 1
NAUSEA: 0
SHORTNESS OF BREATH: 1
VOMITING: 0
EYE PAIN: 0
COLOR CHANGE: 0

## 2019-05-19 ASSESSMENT — PAIN SCALES - GENERAL
PAINLEVEL_OUTOF10: 7
PAINLEVEL_OUTOF10: 8
PAINLEVEL_OUTOF10: 0

## 2019-05-19 ASSESSMENT — PAIN DESCRIPTION - FREQUENCY: FREQUENCY: CONTINUOUS

## 2019-05-19 ASSESSMENT — PAIN DESCRIPTION - DESCRIPTORS: DESCRIPTORS: SORE;TIGHTNESS

## 2019-05-19 ASSESSMENT — PAIN DESCRIPTION - LOCATION
LOCATION: CHEST;THROAT
LOCATION: THROAT

## 2019-05-19 NOTE — ED NOTES
Internal Medicine team at bedside     Star Bravo, [facility]  05/19/19 8703

## 2019-05-19 NOTE — H&P
[address]     Department of Internal Medicine - Staff Internal Medicine Service          ADMISSION NOTE/HISTORY AND PHYSICAL EXAMINATION   ______________________________________________________________________    HISTORY OBTAIN FROM:  patient, electronic medical record    CHIEF COMPLAINT:  Shortness of breath and productive cough of 2 days' duration      HISTORY OF PRESENT ILLNESS:      The patient is a pleasant 53 y.o. female who presented to the ED for productive cough since last 2 weeks and SOB of 2 days duration. Patient stated that she has a history of COPD and was on breathing treatment and nebulizers until 2 years ago. Patient stated that she's been also having productive cough since last 2 weeks which has increased over the last couple of days with yellowish sputum tinged with blood sometimes. Patient stated that this has been accompanied with runny nose, sinusitis, sinus pressure within the last 2 weeks. Patient stated that she's been having shortness of breath worse in the last 2 days with no chest pain but only chest congestion. Patient stated that since last 2 days she's been having shortness of breath and wheezing. Patient stated that she however does not have any problems while ambulating and performing ADLs. Patient stated that earlier she was using a nebulizer machine but then she discarded  it when she got to know that it had molds and fungus in it. Patient stated that she has not been on any kind of breathing treatment or medication since the last 2 years. Patient stated that this has been accompanied with difficulty laying flat and getting up intermittently in the night catching for breath but that is chronic. Patient stated that this has been accompanied with palpitations. Patient stated that she has never passed out but has felt near pass out on one or 2 occasions in the past.  Patient stated that her oral intake of fluids is adequate.    Patient stated that she has a chronic history of smoking since the age of 13 years with one pack a day. Patient stated that she has contemplated quitting many times and is willing to try the nicotine patches. Patient stated that she also has a history of asthma which was diagnosed 10 years ago and until 2 years ago she was intermittently using inhalers for the same. Patient stated that she also has a history of eczema/eczematous dermatitis. Patient also stated that she is a chronic alcoholic with a history of alcohol withdrawals with her last drink being two 24 ounce beers on the night previous to admission. Patient stated that she had a PCP when she was in [place] but since she is shifted to to [place] she has not followed up with any primary care provider. Patient stated that she has a single kidney and had 1/5th of the liver removed. Patient stated that she occasionally uses marijuana and was passed cocaine user but has been clean for the past 17 years. Patient stated that she also has a history of depression for which was using Remeron and Zyprexa but has not used them since the last 2 years.       PAST MEDICAL HISTORY:        Diagnosis Date    Asthma     Chronic kidney disease     COPD (ch[facility] obstructive pulmonary disease) ([facility] [address]     Hypertension     Liver disease        PAST SURGICAL HISTORY:        Procedure Laterality Date    IA EXCISION OF BONE, LOWER JAW N/A 3/17/2017    COMPLEX LACERATION REPAIR WITH DEBRIDEMENT performed by Jeromy Zayas DDS at [address]:  Medications Prior to Admission: albuterol sulfate HFA (PROVENTIL HFA) 108 (90 BASE) MCG/ACT inhaler, Inhale 1-2 puffs into the lungs every 4 hours as needed for Wheezing  mirtazapine (REMERON) 15 MG tablet, Take 1 tablet by mouth nightly  OLANZapine (ZYPREXA) 5 MG tablet, Take 1 tablet by mouth nightly  albuterol sulfate HFA (PROVENTIL HFA) 108 (90 BASE) MCG/ACT inhaler, Inhale 1-2 puffs into the lungs every 4 hours as needed for Wheezing    Allergies:  Tramadol    SOCIAL HISTORY:   TOBACCO:   reports that she has been smoking cigarettes. She has a 15.00 pack-year smoking history. She has never used smokeless tobacco.  ETOH:   reports that she drinks alcohol. DRUGS:   reports that she has current or past drug history. Drug: Marijuana. ACTIVITIES OF DAILY LIVING:  Independent in ADLs  Patient currently lives with family , stated that she lives with her stepdad. Travel History:  Denies any recent travel history. FAMILY HISTORY:   No family history on file. REVIEW OF SYSTEMS:  Review of Systems -   General ROS: Completed and except as mentioned above were negative   Psychological ROS:  Completed and except as mentioned above were negative  Allergy and Immunology ROS:  Completed and except as mentioned above were negative  Hematological and Lymphatic ROS:  Completed and except as mentioned above were negative  Respiratory ROS:  Completed and except as mentioned above were negative  Cardiovascular ROS:  Completed and except as mentioned above were negative  Gastrointestinal ROS: Completed and except as mentioned above were negative  Genito-Urinary ROS:  Completed and except as mentioned above were negative  Musculoskeletal ROS:  Completed and except as mentioned above were negative  Neurological ROS:  Completed and except as mentioned above were negative  Dermatological ROS:  Completed and except as mentioned above were negative    PHYSICAL EXAM:  BP (!) 150/82   Pulse 111   Temp 98 °F (36.7 °C) (Oral)   Resp 18   Ht 5' 3\" (1.6 m)   Wt 200 lb (90.7 kg)   SpO2 95%   BMI 35.43 kg/m²   Physical Exam   Constitutional: She is oriented to person, place, and time. She appears well-developed and well-nourished. She appears distressed. HENT:   Head: Normocephalic. Eczematous patches and allergy shiners on the face below the eyes on both sides. Eyes: Pupils are equal, round, and reactive to light.    Neck: Normal range of motion. No thyromegaly present. Cardiovascular: Normal rate, regular rhythm, normal heart sounds and intact distal pulses. Exam reveals no gallop and no friction rub. No murmur heard. Pulmonary/Chest: She is in respiratory distress. She has wheezes (Diffuse wheezes bilaterally). Abdominal: Soft. Bowel sounds are normal. She exhibits no distension. There is no tenderness. There is no rebound. No hernia. Musculoskeletal: Normal range of motion. She exhibits no edema. Lymphadenopathy:     She has no cervical adenopathy. Neurological: She is alert and oriented to person, place, and time. Skin: Skin is warm. Capillary refill takes less than 2 seconds. She is not diaphoretic. Psychiatric: She has a normal mood and affect. Her behavior is normal. Judgment and thought content normal.           DATA:  Old records have not been requested    IMPRESSION  This is a 53 y.o. female who presented with shortness of breath and productive cough of 2 days' duration and found to have COPD exacerbation with acute bronchitis. Patient requires  admit to inpatient status because untreated COPD exacerbation can lead to morbidity and mortality. ASSESSMENT/PLAN:  1. Acute on chronic exacerbation of COPD. Due to #2 and #3. Start Solu-Medrol 80 mg IV every 8. Continue breathing treatments with dulera, duoneb. Dulera 200/5 2 puff twice a day. DuoNeb 4 times daily. 2. Acute Bronchitis. Start Levaquin 500 mg daily. 3. Nicotine dependence with current use with 1 pack per day for 20+ years. Continue nicotine patch. Counseled  4. Alcohol abuse. Obtain blood ethanol levels. Monitor for withdrawals. Start on CIWA protocol as needed. 5. Obesity and undiagnosed sleep apnea. Stop bang score of 5. BiPAP as needed. Sleep study at the time of discharge.     Sheri Sanchez,   PGY-1 Internal Medicine Resident  [address] [facility], [facility]      Attending Physician Statement  I have discussed the case, including pertinent history and exam findings with the resident and the team.  I have seen and examined the patient and the key elements of the encounter have been performed by me. I agree with the assessment, plan and orders as documented by the resident.       Eduard Burdick MD, AISHA, [address]  Attending Physician, Internal Medicine Service    Internal Medicine Residency Program  5/20/2019, 12:09 PM

## 2019-05-19 NOTE — ED PROVIDER NOTES
[facility]  Emergency Department Encounter  EmergencyMedicine Resident     Pt Janel BRYANT Call  MRN: [id number]  [place] 1975  Date of evaluation: 5/19/19  PCP:  No primary care provider on file. CHIEF COMPLAINT       Chief Complaint   Patient presents with    Shortness of Breath       HISTORY OF PRESENT ILLNESS  (Location/Symptom, Timing/Onset, Context/Setting, Quality, Duration, Modifying Factors, Severity.)      Allison Coppola is a 53 y.o. female who presents with chief complaint of shortness of breath. Does have a history of COPD, doesn't use oxygen at home. She is a daily smoker. States that symptoms worsened last night. Has been using her inhaler more frequently, but states that her nebulizer machine is without a specific part keeps her from using it. Also complaining of cough productive of yellow sputum for the past few days. Also complaining of diffuse chest pain, more so on the right chest.  Denies any nausea, vomiting, diaphoresis. Denies any belly pain. Is also complaining of left toe pain. States that she stubbed her toe on an unidentified object last night. PAST MEDICAL / SURGICAL / SOCIAL / FAMILY HISTORY      has a past medical history of Asthma, Chronic kidney disease, COPD (chronic obstructive pulmonary disease) ([facility] [address] Hypertension, and Liver disease. has a past surgical history that includes pr excision of bone, lower jaw (N/A, 3/17/2017). Social History     Socioeconomic History    Marital status:       Spouse name: Not on file    Number of children: Not on file    Years of education: Not on file    Highest education level: Not on file   Occupational History    Not on file   Social Needs    Financial resource strain: Not on file    Food insecurity:     Worry: Not on file     Inability: Not on file    Transportation needs:     Medical: Not on file     Non-medical: Not on file   Tobacco Use    Smoking status: Current Every Day Smoker     Packs/day: 1.00     Years: 15.00     Pack years: 15.00     Types: Cigarettes    Smokeless tobacco: Never Used    Tobacco comment: gum ordered   Substance and Sexual Activity    Alcohol use: Yes     Comment: drinks frequently    Drug use: Yes     Types: Marijuana    Sexual activity: Not on file   Lifestyle    Physical activity:     Days per week: Not on file     Minutes per session: Not on file    Stress: Not on file   Relationships    Social connections:     Talks on phone: Not on file     Gets together: Not on file     Attends Jehovah's witness service: Not on file     Active member of club or organization: Not on file     Attends meetings of clubs or organizations: Not on file     Relationship status: Not on file    Intimate partner violence:     Fear of current or ex partner: Not on file     Emotionally abused: Not on file     Physically abused: Not on file     Forced sexual activity: Not on file   Other Topics Concern    Not on file   Social History Narrative    Not on file       No family history on file. Allergies:  Tramadol    Home Medications:  Prior to Admission medications    Medication Sig Start Date End Date Taking? Authorizing Provider   albuterol sulfate HFA (PROVENTIL HFA) 108 (90 BASE) MCG/ACT inhaler Inhale 1-2 puffs into the lungs every 4 hours as needed for Wheezing 6/9/17   Zachary Basurto MD   mirtazapine (REMERON) 15 MG tablet Take 1 tablet by mouth nightly 3/10/17   Hemant Khan MD   OLANZapine (ZYPREXA) 5 MG tablet Take 1 tablet by mouth nightly 3/10/17   Hemant Khan MD   albuterol sulfate HFA (PROVENTIL HFA) 108 (90 BASE) MCG/ACT inhaler Inhale 1-2 puffs into the lungs every 4 hours as needed for Wheezing 11/15/16   Kenneth Zhao MD       REVIEW OF SYSTEMS    (2-9 systems for level 4, 10 or more for level 5)      Review of Systems   Constitutional: Negative for chills and fever. HENT: Negative for rhinorrhea and sore throat. Eyes: Negative for pain and visual disturbance. Respiratory: Positive for cough, shortness of breath and wheezing. Cardiovascular: Positive for chest pain. Negative for palpitations. Gastrointestinal: Negative for abdominal pain, nausea and vomiting. Genitourinary: Negative for difficulty urinating and dysuria. Musculoskeletal: Negative for arthralgias and myalgias. Skin: Negative for color change and wound. Neurological: Negative for weakness, numbness and headaches. Psychiatric/Behavioral: Negative for behavioral problems and dysphoric mood. PHYSICAL EXAM   (up to 7 for level 4, 8 or more for level 5)      INITIAL VITALS:   BP (!) 161/83   Pulse 111   Temp 98 °F (36.7 °C) (Oral)   Resp 18   Ht 5' 3\" (1.6 m)   Wt 200 lb (90.7 kg)   SpO2 98%   BMI 35.43 kg/m²     Physical Exam   Constitutional: She is oriented to person, place, and time. She appears well-developed and well-nourished. No distress. HENT:   Head: Normocephalic and atraumatic. Mouth/Throat: Oropharynx is clear and moist.   Eyes: Pupils are equal, round, and reactive to light. EOM are normal.   Neck: Normal range of motion. Cardiovascular: Normal rate and regular rhythm. Pulmonary/Chest: Effort normal. She has wheezes. She has no rales. Diffuse inspiratory and expiratory wheezes   Abdominal: Soft. There is no tenderness. There is no rebound and no guarding. Musculoskeletal: Normal range of motion. Left fourth toe pain; swelling and ecchymosis; strong distal pulses with good cap refill   Neurological: She is alert and oriented to person, place, and time. She has normal strength. GCS eye subscore is 4. GCS verbal subscore is 5. GCS motor subscore is 6. Skin: Skin is warm and dry.    Psychiatric: Her behavior is normal.       DIFFERENTIAL  DIAGNOSIS     PLAN (LABS / IMAGING / EKG):  Orders Placed This Encounter   Procedures    CBC Auto Differential    Basic Metabolic Panel    Troponin    Inpatient consult to Internal Medicine    EKG 12 Lead    Insert peripheral IV    PATIENT STATUS (FROM ED OR OR/PROCEDURAL) Inpatient    PATIENT STATUS (FROM ED OR OR/PROCEDURAL) Inpatient       MEDICATIONS ORDERED:  Orders Placed This Encounter   Medications    aspirin chewable tablet 324 mg    predniSONE (DELTASONE) tablet 60 mg    albuterol (PROVENTIL) nebulizer solution 5 mg    levofloxacin (LEVAQUIN) tablet 500 mg       DIAGNOSTIC RESULTS / EMERGENCY DEPARTMENT COURSE / MDM     LABS:  Results for orders placed or performed during the hospital encounter of 05/19/19   CBC Auto Differential   Result Value Ref Range    WBC 7.1 3.5 - 11.3 k/uL    RBC 4.72 3.95 - 5.11 m/uL    Hemoglobin 15.7 (H) 11.9 - 15.1 g/dL    Hematocrit 46.0 36.3 - 47.1 %    MCV 97.5 82.6 - 102.9 fL    MCH 33.3 25.2 - 33.5 pg    MCHC 34.1 28.4 - 34.8 g/dL    RDW 14.3 11.8 - 14.4 %    Platelets 141 868 - 191 k/uL    MPV 8.9 8.1 - 13.5 fL    NRBC Automated 0.0 0.0 per 100 WBC    Differential Type NOT REPORTED     Seg Neutrophils 63 36 - 65 %    Lymphocytes 25 24 - 43 %    Monocytes 8 3 - 12 %    Eosinophils % 3 1 - 4 %    Basophils 1 0 - 2 %    Immature Granulocytes 0 0 %    Segs Absolute 4.48 1.50 - 8.10 k/uL    Absolute Lymph # 1.79 1.10 - 3.70 k/uL    Absolute Mono # 0.56 0.10 - 1.20 k/uL    Absolute Eos # 0.22 0.00 - 0.44 k/uL    Basophils # 0.04 0.00 - 0.20 k/uL    Absolute Immature Granulocyte <0.03 0.00 - 0.30 k/uL    WBC Morphology NOT REPORTED     RBC Morphology NOT REPORTED     Platelet Estimate NOT REPORTED    Basic Metabolic Panel   Result Value Ref Range    Glucose 82 70 - 99 mg/dL    BUN 9 6 - 20 mg/dL    CREATININE 0.77 0.50 - 0.90 mg/dL    Bun/Cre Ratio NOT REPORTED 9 - 20    Calcium 9.7 8.6 - 10.4 mg/dL    Sodium 131 (L) 135 - 144 mmol/L    Potassium 4.4 3.7 - 5.3 mmol/L    Chloride 91 (L) 98 - 107 mmol/L    CO2 26 20 - 31 mmol/L    Anion Gap 14 9 - 17 mmol/L    GFR Non-African American >60 >60 mL/min    GFR African American >60 >60 mL/min    GFR Comment          GFR Staging NOT REPORTED mis-transcribed.)        [address], MD  Resident  05/19/19 2031

## 2019-05-19 NOTE — ED PROVIDER NOTES
I performed a history and physical examination of the patient and discussed management with the resident. I reviewed the residents note and agree with the documented findings and plan of care. Any areas of disagreement are noted on the chart. I was personally present for the key portions of any procedures. I have documented in the chart those procedures where I was not present during the key portions. I have reviewed the emergency nurses triage note. I agree with the chief complaint, past medical history, past surgical history, allergies, medications, social and family history as documented unless otherwise noted below. Documentation of the HPI, Physical Exam and Medical Decision Making performed by medical students or scribes is based on my personal performance of the HPI, PE and MDM. For Phys Assistant/ Nurse Practitioner cases/documentation I have personally evaluated this patient and have completed at least one if not all key elements of the E/M (history, physical exam, and MDM). I find the patient's history and physical exam are consistent with the NP/PA documentation. I agree with the care provided, treatment rendered, disposition and followup plan. Additional findings are as noted. Angel Paige. Daniel Burt MD  Attending Emergency  Physician      EKG Interpretation    Interpreted by me    Rhythm: normal sinus   Rate: normal  Axis: normal  Ectopy: none  Conduction: normal  ST Segments: no acute change  T Waves: no acute change  Q Waves: none  Poor R-wave progression. Low QRS voltage. Clinical Impression: no acute changes. When compared to prior tracing dated 6/9/2017, there are no obvious significant morphologic changes noted. C/O SOB, COUGH PROD OF YELLOWISH SPUTUM, SORE THROAT FOR SEV DAYS. NO HEMOPTYSIS, FEVER, CHILLS. SEV EPISODES OF POSTTUSSIVE EMESIS.  SOME RIGHT SIDED CHEST DISCOMFORT WITH COUGHING. ALSO C/O PAIN/SWELLING LEFT FOURTH TOE AFTER ACCIDENTALLY KICKING SOMETHING WHILE WALKING IN DARK LAST NIGHT. DENIES ANY OTHER INJURY. HX OF ASTHMA/COPD, OFF HER BRONCHODILATORS FOR SEV WEEKS. SMOKER. NO VTE HX OR RISK FACTORS. AWAKE, ALERT, COOP, RESP. LUNGS-OCC SCATTERED EXP WHEEZES. NO RALES, RHONCHI, STRIDOR, RETRACTIONS. CHEST WALL MILDLY TENDER OVER RIGHT ANT CHEST. NO CREPITUS, DEFORMITY, PALP ABN. CARDIAC-S1S2, RRR, NO MRG. ABD SOFT, NONDISTENDED, NONTENDER. NORMAL BOWEL SOUNDS. OROPHARYNX NORMAL. VOICE NORMAL. HANDLING SECRETIONS NORMALLY. LEFT FOOT/FOURTH TOE-TENDER, SWOLLEN. NO DEFORMITY, CREPITUS. DISTAL CAP REFILL/SENSATION INTACT. IMP-BRONCHITIS, EXACERBATION COPD/ASTHMA, FX LEFT FOURTH TOE. PLAN-INHALED BRONCHODILATOR, ICEPACK, REASSESS.         Marti Guerrero MD  05/19/19 401 Liseth Montoya MD  05/19/19 1640

## 2019-05-19 NOTE — ED TRIAGE NOTES
Pt presents to ed aaox3 c/o sob that started last night. Pt used rescue inhaler this morning. Pt associates intermittent sharp c/p, soreness/tightness in throat, cough with yellow/bloody sputum, n/v. Pt denies fever/chills, loc, injury. Hx of copd, and has one functional kidney, htn. C/o Left foot pain after bumping into something hard and possibly fractured.  Pt tachypneic, NS on monitor, hypertensive

## 2019-05-20 PROBLEM — E87.20 LACTIC ACIDOSIS: Status: ACTIVE | Noted: 2019-05-20

## 2019-05-20 PROBLEM — J40 BRONCHITIS: Status: ACTIVE | Noted: 2019-05-20

## 2019-05-20 LAB
ABSOLUTE EOS #: 0 K/UL (ref 0–0.44)
ABSOLUTE IMMATURE GRANULOCYTE: 0.05 K/UL (ref 0–0.3)
ABSOLUTE LYMPH #: 0.32 K/UL (ref 1.1–3.7)
ABSOLUTE MONO #: 0.11 K/UL (ref 0.1–1.2)
ALBUMIN SERPL-MCNC: 4 G/DL (ref 3.5–5.2)
ALBUMIN/GLOBULIN RATIO: 1.4 (ref 1–2.5)
ALP BLD-CCNC: 60 U/L (ref 35–104)
ALT SERPL-CCNC: 39 U/L (ref 5–33)
AMPHETAMINE SCREEN URINE: NEGATIVE
ANION GAP SERPL CALCULATED.3IONS-SCNC: 13 MMOL/L (ref 9–17)
AST SERPL-CCNC: 27 U/L
BARBITURATE SCREEN URINE: NEGATIVE
BASOPHILS # BLD: 0 % (ref 0–2)
BASOPHILS ABSOLUTE: 0 K/UL (ref 0–0.2)
BENZODIAZEPINE SCREEN, URINE: NEGATIVE
BILIRUB SERPL-MCNC: 0.38 MG/DL (ref 0.3–1.2)
BILIRUBIN DIRECT: 0.12 MG/DL
BILIRUBIN URINE: NEGATIVE
BILIRUBIN, INDIRECT: 0.26 MG/DL (ref 0–1)
BUN BLDV-MCNC: 11 MG/DL (ref 6–20)
BUN/CREAT BLD: ABNORMAL (ref 9–20)
BUPRENORPHINE URINE: ABNORMAL
CALCIUM SERPL-MCNC: 8.8 MG/DL (ref 8.6–10.4)
CANNABINOID SCREEN URINE: POSITIVE
CHLORIDE BLD-SCNC: 94 MMOL/L (ref 98–107)
CO2: 26 MMOL/L (ref 20–31)
COCAINE METABOLITE, URINE: NEGATIVE
COLOR: YELLOW
COMMENT UA: ABNORMAL
CREAT SERPL-MCNC: 0.57 MG/DL (ref 0.5–0.9)
CREATININE URINE: 213.1 MG/DL (ref 28–217)
DIFFERENTIAL TYPE: ABNORMAL
EOSINOPHILS RELATIVE PERCENT: 0 % (ref 1–4)
GFR AFRICAN AMERICAN: >60 ML/MIN
GFR NON-AFRICAN AMERICAN: >60 ML/MIN
GFR SERPL CREATININE-BSD FRML MDRD: ABNORMAL ML/MIN/{1.73_M2}
GFR SERPL CREATININE-BSD FRML MDRD: ABNORMAL ML/MIN/{1.73_M2}
GLOBULIN: ABNORMAL G/DL (ref 1.5–3.8)
GLUCOSE BLD-MCNC: 162 MG/DL (ref 65–105)
GLUCOSE BLD-MCNC: 188 MG/DL (ref 70–99)
GLUCOSE URINE: ABNORMAL
HCT VFR BLD CALC: 44.2 % (ref 36.3–47.1)
HEMOGLOBIN: 14.7 G/DL (ref 11.9–15.1)
IMMATURE GRANULOCYTES: 1 %
KETONES, URINE: ABNORMAL
LACTIC ACID, WHOLE BLOOD: 1.1 MMOL/L (ref 0.7–2.1)
LEUKOCYTE ESTERASE, URINE: NEGATIVE
LYMPHOCYTES # BLD: 6 % (ref 24–43)
MCH RBC QN AUTO: 32.4 PG (ref 25.2–33.5)
MCHC RBC AUTO-ENTMCNC: 33.3 G/DL (ref 28.4–34.8)
MCV RBC AUTO: 97.4 FL (ref 82.6–102.9)
MDMA URINE: ABNORMAL
METHADONE SCREEN, URINE: NEGATIVE
METHAMPHETAMINE, URINE: ABNORMAL
MONOCYTES # BLD: 2 % (ref 3–12)
MORPHOLOGY: NORMAL
NITRITE, URINE: NEGATIVE
NRBC AUTOMATED: 0 PER 100 WBC
OPIATES, URINE: NEGATIVE
OXYCODONE SCREEN URINE: NEGATIVE
PDW BLD-RTO: 14.3 % (ref 11.8–14.4)
PH UA: 5.5 (ref 5–8)
PHENCYCLIDINE, URINE: NEGATIVE
PLATELET # BLD: 235 K/UL (ref 138–453)
PLATELET ESTIMATE: ABNORMAL
PMV BLD AUTO: 8.8 FL (ref 8.1–13.5)
POTASSIUM SERPL-SCNC: 4.1 MMOL/L (ref 3.7–5.3)
PROPOXYPHENE, URINE: ABNORMAL
PROTEIN UA: NEGATIVE
RBC # BLD: 4.54 M/UL (ref 3.95–5.11)
RBC # BLD: ABNORMAL 10*6/UL
SEG NEUTROPHILS: 91 % (ref 36–65)
SEGMENTED NEUTROPHILS ABSOLUTE COUNT: 4.92 K/UL (ref 1.5–8.1)
SODIUM BLD-SCNC: 133 MMOL/L (ref 135–144)
SODIUM,UR: <20 MMOL/L
SPECIFIC GRAVITY UA: 1.03 (ref 1–1.03)
TEST INFORMATION: ABNORMAL
TOTAL PROTEIN: 6.8 G/DL (ref 6.4–8.3)
TRICYCLIC ANTIDEPRESSANTS, UR: ABNORMAL
TURBIDITY: CLEAR
URINE HGB: NEGATIVE
UROBILINOGEN, URINE: NORMAL
WBC # BLD: 5.4 K/UL (ref 3.5–11.3)
WBC # BLD: ABNORMAL 10*3/UL

## 2019-05-20 PROCEDURE — 94640 AIRWAY INHALATION TREATMENT: CPT

## 2019-05-20 PROCEDURE — 6370000000 HC RX 637 (ALT 250 FOR IP): Performed by: STUDENT IN AN ORGANIZED HEALTH CARE EDUCATION/TRAINING PROGRAM

## 2019-05-20 PROCEDURE — 6360000002 HC RX W HCPCS: Performed by: STUDENT IN AN ORGANIZED HEALTH CARE EDUCATION/TRAINING PROGRAM

## 2019-05-20 PROCEDURE — 85025 COMPLETE CBC W/AUTO DIFF WBC: CPT

## 2019-05-20 PROCEDURE — 97530 THERAPEUTIC ACTIVITIES: CPT

## 2019-05-20 PROCEDURE — 6370000000 HC RX 637 (ALT 250 FOR IP): Performed by: INTERNAL MEDICINE

## 2019-05-20 PROCEDURE — 82947 ASSAY GLUCOSE BLOOD QUANT: CPT

## 2019-05-20 PROCEDURE — 80048 BASIC METABOLIC PNL TOTAL CA: CPT

## 2019-05-20 PROCEDURE — 81003 URINALYSIS AUTO W/O SCOPE: CPT

## 2019-05-20 PROCEDURE — 97161 PT EVAL LOW COMPLEX 20 MIN: CPT

## 2019-05-20 PROCEDURE — 2580000003 HC RX 258: Performed by: STUDENT IN AN ORGANIZED HEALTH CARE EDUCATION/TRAINING PROGRAM

## 2019-05-20 PROCEDURE — 82570 ASSAY OF URINE CREATININE: CPT

## 2019-05-20 PROCEDURE — 1200000000 HC SEMI PRIVATE

## 2019-05-20 PROCEDURE — 80076 HEPATIC FUNCTION PANEL: CPT

## 2019-05-20 PROCEDURE — 94760 N-INVAS EAR/PLS OXIMETRY 1: CPT

## 2019-05-20 PROCEDURE — 99223 1ST HOSP IP/OBS HIGH 75: CPT | Performed by: INTERNAL MEDICINE

## 2019-05-20 PROCEDURE — 83605 ASSAY OF LACTIC ACID: CPT

## 2019-05-20 PROCEDURE — 84300 ASSAY OF URINE SODIUM: CPT

## 2019-05-20 PROCEDURE — 36415 COLL VENOUS BLD VENIPUNCTURE: CPT

## 2019-05-20 PROCEDURE — 97165 OT EVAL LOW COMPLEX 30 MIN: CPT

## 2019-05-20 PROCEDURE — 97535 SELF CARE MNGMENT TRAINING: CPT

## 2019-05-20 RX ORDER — PREDNISONE 20 MG/1
40 TABLET ORAL DAILY
Status: DISCONTINUED | OUTPATIENT
Start: 2019-05-21 | End: 2019-05-21 | Stop reason: HOSPADM

## 2019-05-20 RX ORDER — CALCIUM CARBONATE 200(500)MG
500 TABLET,CHEWABLE ORAL 3 TIMES DAILY PRN
Status: DISCONTINUED | OUTPATIENT
Start: 2019-05-20 | End: 2019-05-21 | Stop reason: HOSPADM

## 2019-05-20 RX ADMIN — METHYLPREDNISOLONE SODIUM SUCCINATE 80 MG: 125 INJECTION, POWDER, FOR SOLUTION INTRAMUSCULAR; INTRAVENOUS at 06:59

## 2019-05-20 RX ADMIN — LEVOFLOXACIN 500 MG: 500 TABLET, FILM COATED ORAL at 09:03

## 2019-05-20 RX ADMIN — ACETAMINOPHEN 650 MG: 325 TABLET ORAL at 04:01

## 2019-05-20 RX ADMIN — IPRATROPIUM BROMIDE AND ALBUTEROL SULFATE 1 AMPULE: .5; 3 SOLUTION RESPIRATORY (INHALATION) at 15:39

## 2019-05-20 RX ADMIN — INSULIN LISPRO 2 UNITS: 100 INJECTION, SOLUTION INTRAVENOUS; SUBCUTANEOUS at 09:11

## 2019-05-20 RX ADMIN — MOMETASONE FUROATE AND FORMOTEROL FUMARATE DIHYDRATE 2 PUFF: 100; 5 AEROSOL RESPIRATORY (INHALATION) at 08:51

## 2019-05-20 RX ADMIN — INSULIN LISPRO 4 UNITS: 100 INJECTION, SOLUTION INTRAVENOUS; SUBCUTANEOUS at 18:55

## 2019-05-20 RX ADMIN — IPRATROPIUM BROMIDE AND ALBUTEROL SULFATE 1 AMPULE: .5; 3 SOLUTION RESPIRATORY (INHALATION) at 18:50

## 2019-05-20 RX ADMIN — Medication 10 ML: at 21:33

## 2019-05-20 RX ADMIN — AMLODIPINE BESYLATE 5 MG: 5 TABLET ORAL at 09:00

## 2019-05-20 RX ADMIN — IPRATROPIUM BROMIDE AND ALBUTEROL SULFATE 1 AMPULE: .5; 3 SOLUTION RESPIRATORY (INHALATION) at 08:51

## 2019-05-20 RX ADMIN — SODIUM CHLORIDE: 9 INJECTION, SOLUTION INTRAVENOUS at 17:05

## 2019-05-20 RX ADMIN — ANTACID TABLETS 500 MG: 500 TABLET, CHEWABLE ORAL at 21:36

## 2019-05-20 RX ADMIN — ENOXAPARIN SODIUM 30 MG: 30 INJECTION SUBCUTANEOUS at 21:33

## 2019-05-20 RX ADMIN — IPRATROPIUM BROMIDE AND ALBUTEROL SULFATE 1 AMPULE: .5; 3 SOLUTION RESPIRATORY (INHALATION) at 11:36

## 2019-05-20 RX ADMIN — ENOXAPARIN SODIUM 30 MG: 30 INJECTION SUBCUTANEOUS at 09:35

## 2019-05-20 ASSESSMENT — PAIN DESCRIPTION - PAIN TYPE
TYPE: ACUTE PAIN
TYPE: ACUTE PAIN

## 2019-05-20 ASSESSMENT — PAIN DESCRIPTION - ONSET: ONSET: SUDDEN

## 2019-05-20 ASSESSMENT — PAIN DESCRIPTION - DESCRIPTORS
DESCRIPTORS: ACHING
DESCRIPTORS: ACHING

## 2019-05-20 ASSESSMENT — PAIN DESCRIPTION - FREQUENCY
FREQUENCY: INTERMITTENT
FREQUENCY: INTERMITTENT

## 2019-05-20 ASSESSMENT — PAIN SCALES - GENERAL
PAINLEVEL_OUTOF10: 2
PAINLEVEL_OUTOF10: 3
PAINLEVEL_OUTOF10: 9
PAINLEVEL_OUTOF10: 0

## 2019-05-20 ASSESSMENT — PAIN DESCRIPTION - PROGRESSION: CLINICAL_PROGRESSION: NOT CHANGED

## 2019-05-20 ASSESSMENT — PAIN DESCRIPTION - LOCATION
LOCATION: BACK
LOCATION: CHEST;BACK;THROAT

## 2019-05-20 ASSESSMENT — PAIN - FUNCTIONAL ASSESSMENT: PAIN_FUNCTIONAL_ASSESSMENT: ACTIVITIES ARE NOT PREVENTED

## 2019-05-20 ASSESSMENT — PAIN DESCRIPTION - ORIENTATION: ORIENTATION: LEFT;LOWER

## 2019-05-20 NOTE — PROGRESS NOTES
Physical Therapy    Facility/Department: [facility] CAR 2  Initial Assessment    NAME: Veronique Mckinney Call  : 1975  MRN: [id number]    Date of Service: 2019  Chief Complaint   Patient presents with    Shortness of Breath     Discharge Recommendations:    No therapy recommended at discharge. PT Equipment Recommendations  Equipment Needed: Yes  Mobility Devices: White cane (pt will benefit from white cane for safety and tactile input during amb d/t visual impairment)    Assessment   Body structures, Functions, Activity limitations: Decreased functional mobility ; Decreased safe awareness;Decreased vision/visual deficit; Decreased balance;Decreased endurance;Decreased strength  Assessment: pt amb 450' Seng with no AD, Seng bed mob/transfers, pt limited d/t being legally blind, constant reaching infront for tactile input for direction during amb  Prognosis: Good  Decision Making: Low Complexity  Patient Education: PT POC, breathing technique during asthma exacerbations  REQUIRES PT FOLLOW UP: No  Activity Tolerance  Activity Tolerance: Patient Tolerated treatment well;Patient limited by endurance; Patient limited by fatigue       Patient Diagnosis(es): The primary encounter diagnosis was COPD exacerbation ([facility] [address] A diagnosis of Toe pain, left was also pertinent to this visit. has a past medical history of Asthma, Chronic kidney disease, COPD (chronic obstructive pulmonary disease) ([address] Hypertension, and Liver disease. has a past surgical history that includes pr excision of bone, lower jaw (N/A, 3/17/2017).     Restrictions  Restrictions/Precautions  Restrictions/Precautions: Up as Tolerated, General Precautions  Required Braces or Orthoses?: No  Position Activity Restriction  Other position/activity restrictions: up with assist  Vision/Hearing  Vision: Impaired  Vision Exceptions: Legally blind  Hearing: Within functional limits     Subjective  General  Chart Reviewed: Yes  Patient assessed for rehabilitation services?: Yes  Response To Previous Treatment: Not applicable  Family / Caregiver Present: No  Follows Commands: Within Functional Limits  General Comment  Comments: pt and RN agreeable to PT, pt awake in bed upon arrival  Pain Screening  Patient Currently in Pain: Yes  Pain Assessment  Pain Assessment: 0-10  Pain Level: 9  Pain Type: Acute pain  Pain Location: Chest;Back; Throat  Pain Descriptors: Aching  Pain Frequency: Intermittent  Response to Pain Intervention: Patient Satisfied  Vital Signs  Patient Currently in Pain: Yes  Pre Treatment Pain Screening  Intervention List: Patient able to continue with treatment    Orientation  Orientation  Overall Orientation Status: Within Functional Limits  Social/Functional History  Social/Functional History  Lives With: Family(step-dad, son, step-sister)  Type of Home: House  Home Layout: One level  Home Access: Stairs to enter with rails  Entrance Stairs - Number of Steps: 4  Entrance Stairs - Rails: Both  Bathroom Shower/Tub: Tub/Shower unit  Bathroom Toilet: Standard  Bathroom Equipment: Shower chair  Bathroom Accessibility: Accessible  Home Equipment: (none)  ADL Assistance: Independent  Homemaking Assistance: Independent  Homemaking Responsibilities: Yes  Meal Prep Responsibility: Primary  Laundry Responsibility: Primary  Cleaning Responsibility: Primary  Ambulation Assistance: Independent  Transfer Assistance: Independent  Active : No  Patient's  Info: per pt, legally blind, unable to qualify for drivers license  Mode of Transportation: Family  Occupation: Unemployed  Leisure & Hobbies: watch TV, drink and smoke   IADL Comments: .   Cognition   Cognition  Overall Cognitive Status: WFL    Objective     Observation/Palpation  Posture: Good    AROM RLE (degrees)  RLE AROM: WFL  AROM LLE (degrees)  LLE AROM : WFL  AROM RUE (degrees)  RUE AROM : WFL  AROM LUE (degrees)  LUE AROM : WFL  Strength RLE  Strength RLE: WFL  Strength LLE  Strength LLE: WFL  Strength RUE  Strength RUE: WFL  Strength LUE  Strength LUE: WFL     Sensation  Overall Sensation Status: Impaired(states [place] numbness in feet)  Bed mobility  Rolling to Right: Modified independent  Supine to Sit: Modified independent  Sit to Supine: Modified independent  Scooting: Modified independent  Transfers  Sit to Stand: Modified independent  Stand to sit: Modified independent  Ambulation  Ambulation?: Yes  More Ambulation?: No  Ambulation 1  Surface: level tile  Device: No Device  Assistance: Modified Independent  Quality of Gait: pt unsteady with gait, constant rail grabber per pt this is baseline d/t visual impairment, decreased gait speed  Distance: 450'  Comments: pt stated they are limited community ambulators d/t asthma exacerbations, able to amb I without AD  Stairs/Curb  Stairs?: No  Gait Deviations  Gait Deviations: Slow Ynes;Decreased step length;Decreased step height;Deviated path     Balance  Posture: Good  Sitting - Static: Good  Sitting - Dynamic: Good  Standing - Static: Good;-  Standing - Dynamic: Good;-        Plan   Plan  Times per week: PT to d/c  Safety Devices  Type of devices:  All fall risk precautions in place, Patient at risk for falls, Nurse notified, Left in bed, Call light within reach, Gait belt(RT in room for treatment session upon exit)  Restraints  Initially in place: No      AM-PAC Score  AM-PAC Inpatient Mobility Raw Score : 24  AM-PAC Inpatient T-Scale Score : 61.14  Mobility Inpatient CMS 0-100% Score: 0  Mobility Inpatient CMS G-Code Modifier : [address]        Therapy Time   Individual Concurrent Group Co-treatment   Time In 0828         Time Out 0851         Minutes 23               Physical Therapy to sign off at this time  Ismeal Giron    Patient was evaluated/treated by ANNE Montgomery, and the above documentation was completed and checked for accuracy by the supervising Physical Therapist

## 2019-05-20 NOTE — PLAN OF CARE
Pt here for COPD exacerbation. Still receiving neb treatments. RO pulse ox 96-97%. Intermittent, dry cough. IVF 0.9 NS infusing at 75 ml/hour. Eating and drinking well. Will continue to monitor. Anticipate dc home tomorrow.

## 2019-05-20 NOTE — PROGRESS NOTES
[facility]  Internal Medicine Residency Program  Inpatient Daily Progress Note  ______________________________________________________________________________    Patient: Ivette Kovacs Call  YOB: 1975   MRN: [id number]    Acct: [de-identified]     Admit date: 5/19/2019  Today's date: 05/20/19  Number of days in the hospital: 1  Expected Discharge Date: 05/21/19    Admitting Diagnosis: COPD exacerbation    Subjective:   Pt seen and Chart reviewed. Patient resting in bed comfortably. Able to ambulate independently. Could not  tolerate BiPAP. Put on nasal cannula 2-3 L. On IV Solu Medrol. Wheezing improved.       Objective:   Vital Sign:  BP (!) 149/74   Pulse 89   Temp 98.9 °F (37.2 °C) (Oral)   Resp 14   Ht 5' 3\" (1.6 m)   Wt 201 lb 8 oz (91.4 kg)   SpO2 96%   BMI 35.69 kg/m²       Physical Exam:  General appearance:   alert, well appearing, and in no distress  Mental Status: alert, oriented to person, place, and time  Neurologic:  alert, oriented, normal speech, no focal findings or movement disorder noted  Lungs:   wheezes bilaterally in the lung fields   Heart[de-identified] normal rate, regular rhythm, normal S1, S2, no murmurs, rubs, clicks or gallops  Abdomen:  soft, nontender, nondistended, no masses or organomegaly  Extremities: peripheral pulses normal, no pedal edema, no clubbing or cyanosis   Skin: normal coloration and turgor, no rashes, no suspicious skin lesions noted    Medications:  Scheduled Medications   sodium chloride flush  10 mL Intravenous 2 times per day    mirtazapine  15 mg Oral Nightly    OLANZapine  5 mg Oral Nightly    sodium chloride flush  10 mL Intravenous 2 times per day    enoxaparin  30 mg Subcutaneous BID    methylPREDNISolone  80 mg Intravenous Q8H    ipratropium-albuterol  1 ampule Inhalation Q4H WA    mometasone-formoterol  2 puff Inhalation BID    levofloxacin  500 mg Oral Daily    fluticasone  2 spray Each Nare Daily  nicotine  1 patch Transdermal Daily    amLODIPine  5 mg Oral Daily    insulin lispro  0-12 Units Subcutaneous TID WC    insulin lispro  0-6 Units Subcutaneous Nightly       PRN Medications  sodium chloride flush 10 mL PRN   acetaminophen 650 mg Q4H PRN   sodium chloride flush 10 mL PRN   potassium chloride 40 mEq PRN   Or     potassium alternative oral replacement 40 mEq PRN   Or     potassium chloride 10 mEq PRN   magnesium sulfate 1 g PRN   magnesium hydroxide 30 mL Daily PRN   ondansetron 4 mg Q6H PRN   albuterol 2.5 mg Q6H PRN   albuterol 2.5 mg As Directed RT PRN   acetaminophen 650 mg Q4H PRN   glucose 15 g PRN   dextrose 12.5 g PRN   glucagon (rDNA) 1 mg PRN   dextrose 100 mL/hr PRN       Diagnostic Labs and Imaging:  CBC:  Recent Labs     05/19/19  1629 05/20/19  0620   WBC 7.1 5.4   HGB 15.7* 14.7    235     BMP: Recent Labs     05/19/19  1629 05/20/19  0620   * 133*   K 4.4 4.1   CL 91* 94*   CO2 26 26   BUN 9 11   CREATININE 0.77 0.57   GLUCOSE 82 188*     Hepatic: Recent Labs     05/20/19  0620   AST 27   ALT 39*   BILITOT 0.38   ALKPHOS 60       Assessment and Plan:   1. Acute on chronic exacerbation of COPD. Improving. Change Solu-Medrol to PO Prednisone    2. Acute Bronchitis. Continue Levaquin 500 mg daily. .  3. Alcohol abuse. Pending  blood ethanol levels. Monitor for withdrawals. Start on CIWA protocol as needed. 4. Nicotine dependence. Continue nicotine patch. Tobacco cessation education  5. Obesity and undiagnosed sleep apnea. Stop bang score of 5. BiPAP as needed. Sleep study at the time of discharge. Logan Pal M.D. Internal Medicine Resident , PGY-1  [address] [place] [address]  05/20/19 8:40 AM      Attending Physician Statement  I have discussed the case, including pertinent history and exam findings with the resident and the team.  I have seen and examined the patient and the key elements of the encounter have been performed by me.   I agree with the assessment, plan and orders as documented by the resident.       SOBmproving  Vital stable  Change IV to PO meds  Dc tomorrow    Eduard Guillen MD, AISHA, [address]  Attending Physician, Internal Medicine Service    Internal Medicine Residency Program  5/20/2019, 12:13 PM

## 2019-05-20 NOTE — PLAN OF CARE
PT RESTING IN BED. HAD AN UNEVENTFUL SHIFT. PT DID NOT TOLERATE BIPAP. ONLY HAD IT ON FOR A FEW MIN. VSS. SOLUMEDROL IV GIVEN.  INSP/EXP WHEEZING PRESENT BU NOT AS AUDIBLE WHEN FIRST ARRIVED TO UNIT. SCHEDULED HHN AND MDI'S GIVEN PER RT. NO DISTRESS NOTED.VSS.

## 2019-05-20 NOTE — PROGRESS NOTES
Occupational Therapy   Occupational Therapy Initial Assessment  Date: 2019   Patient Name: Maritza Coppola  MRN: [id number]     : 1975    Date of Service: 2019    Discharge Recommendations:    Further therapy recommended at discharge. OT Equipment Recommendations  Equipment Needed: Yes  Mobility Devices: ADL Assistive Devices  ADL Assistive Devices: Shower Chair with back    Assessment   Performance deficits / Impairments: Decreased functional mobility ; Decreased ADL status; Decreased strength;Decreased endurance;Decreased high-level IADLs  Assessment: Patient demonstrates decreased endurance with any functional activity indicated by s/s of SOB with self-care tasks performance. Patient is limited by lack of adaptations and compensatory strategies known to implement into self-care routine to minimize fatigue. Patient expected to need skilled OT services to address deficits listed above. Prognosis: Good  Decision Making: Low Complexity  Patient Education: ROle of OT, OT POC, compensatory strategies, DME options, fall prevention awareness, pursed lip breathing  REQUIRES OT FOLLOW UP: Yes  Activity Tolerance  Activity Tolerance: Patient limited by fatigue  Activity Tolerance: limited by SOB  Safety Devices  Safety Devices in place: Yes  Type of devices: Call light within reach; Left in bed;Nurse notified           Patient Diagnosis(es): The primary encounter diagnosis was COPD exacerbation ([facility] [address] A diagnosis of Toe pain, left was also pertinent to this visit. has a past medical history of Asthma, Chronic kidney disease, COPD (chronic obstructive pulmonary disease) ([facility] [address] Hypertension, and Liver disease. has a past surgical history that includes pr excision of bone, lower jaw (N/A, 3/17/2017).            Restrictions  Restrictions/Precautions  Restrictions/Precautions: Up as Tolerated, General Precautions  Required Braces or Orthoses?: No  Position Activity Restriction  Other position/activity restrictions: up with assist    Subjective   General  Chart Reviewed: No  Family / Caregiver Present: Yes(oldest son)  General Comment  Comments: RN ok'd patient to be seen for therapy services this date. Social/Functional History  Social/Functional History  Lives With: Other (comment)(Patient lives with family and other people (5-6 others))  Type of Home: House  Home Layout: One level, Able to Live on Main level with bedroom/bathroom  Home Access: Stairs to enter with rails  Entrance Stairs - Number of Steps: 4  Entrance Stairs - Rails: Both  Bathroom Shower/Tub: Tub/Shower unit  Bathroom Toilet: Standard  Bathroom Accessibility: Accessible  ADL Assistance: Independent  Homemaking Assistance: Independent  Homemaking Responsibilities: Yes  Meal Prep Responsibility: Primary  Laundry Responsibility: Primary  Cleaning Responsibility: Primary  Ambulation Assistance: Independent  Transfer Assistance: Independent  Active : No  Patient's  Info: others assist  Occupation: Unemployed  [address] [place] [address] Avenue: watch TV, drink and smoke   IADL Comments: Patient completes IADL tasks only for her family members.         Objective   Vision: Impaired  Vision Exceptions: Legally blind  Hearing: Within functional limits    Orientation  Overall Orientation Status: Within Normal Limits     Balance  Sitting Balance: Supervision  Standing Balance: Stand by assistance  Standing Balance  Time: ~8 minutes  Activity: standing sinkside during ADLs  Comment: s/s of SOB  Functional Mobility  Functional - Mobility Device: No device  Activity: To/from bathroom  Assist Level: Stand by assistance  Toilet Transfers  Toilet - Technique: Ambulating  Equipment Used: Standard toilet  Toilet Transfer: Stand by assistance  ADL  Feeding: Independent  Grooming: Stand by assistance  UE Bathing: Contact guard assistance  LE Bathing: Contact guard assistance  UE Dressing: Stand by assistance  LE Dressing: Stand by assistance  Toileting: Stand by assistance  Additional Comments: Patient sitting EOB for ~12 minutes without oxygen, demonstrating s/s of SOB with communication. Patient completed functional mobility to bathroom and completed brushing teeth and washing face standing sinkside with wheezing and SOB. Patient completed tolieting at SBA and standing to complete hand hygiene at SBA. Patient was educated on pursed lip breathing with fair return. Patient educated on DME available to increase ease with completion of self-care tasks and ways to reduce fall risk with DME with good response.    Tone RUE  RUE Tone: Normotonic  Tone LUE  LUE Tone: Normotonic  Coordination  Movements Are Fluid And Coordinated: Yes  Coordination and Movement description: Tremors(due to detox)     Bed mobility  Scooting: Supervision  Comment: sitting upon arrival and retired sitting  Transfers  Sit to stand: Stand by assistance  Stand to sit: Stand by assistance     Cognition  Overall Cognitive Status: WNL        Sensation  Overall Sensation Status: Impaired(tingling in B feet)      LUE AROM : WFL  Left Hand AROM: WFL  RUE AROM : WFL  Right Hand AROM: WFL  LUE Strength  L Hand Grasp: 4-/5  L Hand Release: 4-/5  RUE Strength  R Hand Grasp: 4-/5  R Hand Release: 4-/5              Plan   Plan  Times per week: 3-4x/wk  Current Treatment Recommendations: Strengthening, Functional Mobility Training, Endurance Training, Safety Education & Training, Patient/Caregiver Education & Training, Equipment Evaluation, Education, & procurement, Self-Care / ADL    AM-PAC Score        AM-[facility] Inpatient Daily Activity Raw Score: 23  AM-PAC Inpatient ADL T-Scale Score : 51.12  ADL Inpatient CMS 0-100% Score: 15.86  ADL Inpatient CMS G-Code Modifier : CI    Goals  Short term goals  Time Frame for Short term goals: Patient will, by discharge   Short term goal 1: Demonstrate use of energy conservation strategies during UB self-care tasks at Supervision  Short term goal 2: Demonstrate use of energy conservation strategies during LB self-care tasks at Supervision  Short term goal 3: verbalize task simplification using 4 P's for functional tasks at home (Plan, prioritize, pace, position) at SBA  Short term goal 4: demonstrate ~25 minutes of functional activity tolerance   Short term goal 5: Identify 4 alternative leisure activities to engage in to replace smoking and drinking to promote a healthier lifestyle       Therapy Time   Individual Concurrent Group Co-treatment   Time In 0857         Time Out 0920         Minutes 23         Timed Code Treatment Minutes: [address], OTR/L

## 2019-05-20 NOTE — CARE COORDINATION
Case Management Initial Discharge Plan  Viola Navarro Call,             Met with:patient to discuss discharge plans. Information verified: address, contacts, phone number, , insurance Yes  PCP: No primary care provider on file. Date of last visit:     Insurance Provider: Donald Finley    Discharge Planning    Living Arrangements:  Children, Family Members   Support Systems:  Family Members, [id number] [address] has 1 stories  4 stairs to climb to get into front door,   Location of bedroom/bathroom in home main    Patient able to perform ADL's:Independent    Current Services (outpatient & in home) none  DME equipment: none  DME provider: none    Pharmacy: [facility] Medications:  No  Does patient want to participate in local refill/ meds to beds program?  No    Potential Assistance Needed:  N/A    Patient agreeable to home care: Yes  Freedom of choice provided:  Would like referral to [facility]    Prior SNF/Rehab Placement and Facility: no  Agreeable to SNF/Rehab: No  [place] of choice provided: no   Evaluation: yes    Expected Discharge date:  19  Patient expects to be discharged to:  home  Follow Up Appointment: Best Day/ Time:      Transportation provider: corrine  Transportation arrangements needed for discharge: No    Readmission Risk              Risk of Unplanned Readmission:        14             Does patient have a readmission risk score greater than 14?: Yes  If yes, follow-up appointment must be made within 7 days of discharge. Discharge Plan: made appt at [facility] with Dr. Linda Hernandez on May 28 at [address]. Patient would like referral to [facility], referral sent, confirmed with Mandy at [facility].   Home with son          Electronically signed by Governor Humaira RN on 19 at 10:04 AM

## 2019-05-20 NOTE — CARE COORDINATION
Met with pt this date to discuss alcohol use and resources for treatment. Pt states that she has been a heavy drinker for many years. She drinks 4-6 24 oz cans of beer daily. She has tried going to [facility] but does not feel that they help her and that she only meets more drug addicts at the meetings. Pt has been in treatment programs in [place] and was also at [facility]. [employer].  Pt agrees that she needs to stop drinking but does not want to go into treatment at this time. She is currently helping to take care of her step dad and does not want to go to a treatment center. Pt may consider outpatient treatment and was agreeable to taking resource list of treatment centers in the area. Other than alcohol, pt does use marijuana occasionally. She used cocaine in the past but has been clean for 17 years. Pt lives with her step father, step aunt and her son. She states that everyone in the home is concerned about her drinking and are wanting her to stop. Pt really feels that she can stop drinking on her own because she was able to stop cocaine use on her own. Pt accepted resource list to use in case she changes her mind. Pt has a history of depression. She states that when she started drinking she took herself off all of her medications. She was afraid of being medicated while drinking. Pt would like to follow up with a psychiatrist and get back on medication for her depression. Provided pt with mental health agency. Pt to review list and make appointment for assessment and follow up. Currently pt does not have a PCP. Discussed with  who states she will be setting up an appointment for pt. Resource lists for drug/alcohol and mental health provided to pt.

## 2019-05-20 NOTE — PLAN OF CARE
MSG SENT TO ON CALL MD TO MAKE AWARE OF PT'S FSBS 316 AND LACTIC ACID LEVEL OF 4.9. AWAITING RESPONSE.

## 2019-05-21 VITALS
WEIGHT: 201.5 LBS | DIASTOLIC BLOOD PRESSURE: 69 MMHG | TEMPERATURE: 98.1 F | HEART RATE: 83 BPM | RESPIRATION RATE: 19 BRPM | OXYGEN SATURATION: 99 % | SYSTOLIC BLOOD PRESSURE: 118 MMHG | BODY MASS INDEX: 35.7 KG/M2 | HEIGHT: 63 IN

## 2019-05-21 LAB
-: ABNORMAL
ABSOLUTE EOS #: <0.03 K/UL (ref 0–0.44)
ABSOLUTE IMMATURE GRANULOCYTE: 0.06 K/UL (ref 0–0.3)
ABSOLUTE LYMPH #: 1.66 K/UL (ref 1.1–3.7)
ABSOLUTE MONO #: 0.61 K/UL (ref 0.1–1.2)
AMORPHOUS: ABNORMAL
ANION GAP SERPL CALCULATED.3IONS-SCNC: 11 MMOL/L (ref 9–17)
BACTERIA: ABNORMAL
BASOPHILS # BLD: 0 % (ref 0–2)
BASOPHILS ABSOLUTE: <0.03 K/UL (ref 0–0.2)
BUN BLDV-MCNC: 17 MG/DL (ref 6–20)
BUN/CREAT BLD: ABNORMAL (ref 9–20)
CALCIUM SERPL-MCNC: 8.8 MG/DL (ref 8.6–10.4)
CASTS UA: ABNORMAL /LPF (ref 0–2)
CHLORIDE BLD-SCNC: 96 MMOL/L (ref 98–107)
CO2: 26 MMOL/L (ref 20–31)
CREAT SERPL-MCNC: 0.72 MG/DL (ref 0.5–0.9)
CRYSTALS, UA: ABNORMAL /HPF
CRYSTALS, UA: ABNORMAL /HPF
DIFFERENTIAL TYPE: ABNORMAL
EOSINOPHILS RELATIVE PERCENT: 0 % (ref 1–4)
EPITHELIAL CELLS UA: ABNORMAL /HPF
GFR AFRICAN AMERICAN: >60 ML/MIN
GFR NON-AFRICAN AMERICAN: >60 ML/MIN
GFR SERPL CREATININE-BSD FRML MDRD: ABNORMAL ML/MIN/{1.73_M2}
GFR SERPL CREATININE-BSD FRML MDRD: ABNORMAL ML/MIN/{1.73_M2}
GLUCOSE BLD-MCNC: 115 MG/DL (ref 70–99)
HCT VFR BLD CALC: 45.7 % (ref 36.3–47.1)
HEMOGLOBIN: 15 G/DL (ref 11.9–15.1)
IMMATURE GRANULOCYTES: 0 %
LACTIC ACID, WHOLE BLOOD: 1.5 MMOL/L (ref 0.7–2.1)
LYMPHOCYTES # BLD: 12 % (ref 24–43)
MCH RBC QN AUTO: 32.6 PG (ref 25.2–33.5)
MCHC RBC AUTO-ENTMCNC: 32.8 G/DL (ref 28.4–34.8)
MCV RBC AUTO: 99.3 FL (ref 82.6–102.9)
MONOCYTES # BLD: 5 % (ref 3–12)
MUCUS: ABNORMAL
NRBC AUTOMATED: 0 PER 100 WBC
OTHER OBSERVATIONS UA: ABNORMAL
PDW BLD-RTO: 14.9 % (ref 11.8–14.4)
PLATELET # BLD: 265 K/UL (ref 138–453)
PLATELET ESTIMATE: ABNORMAL
PMV BLD AUTO: 9.3 FL (ref 8.1–13.5)
POTASSIUM SERPL-SCNC: 4.1 MMOL/L (ref 3.7–5.3)
RBC # BLD: 4.6 M/UL (ref 3.95–5.11)
RBC # BLD: ABNORMAL 10*6/UL
RBC UA: ABNORMAL /HPF (ref 0–2)
RENAL EPITHELIAL, UA: ABNORMAL /HPF
SEG NEUTROPHILS: 83 % (ref 36–65)
SEGMENTED NEUTROPHILS ABSOLUTE COUNT: 10.99 K/UL (ref 1.5–8.1)
SODIUM BLD-SCNC: 133 MMOL/L (ref 135–144)
TRICHOMONAS: ABNORMAL
WBC # BLD: 13.3 K/UL (ref 3.5–11.3)
WBC # BLD: ABNORMAL 10*3/UL
WBC UA: ABNORMAL /HPF (ref 0–5)
YEAST: ABNORMAL

## 2019-05-21 PROCEDURE — 85025 COMPLETE CBC W/AUTO DIFF WBC: CPT

## 2019-05-21 PROCEDURE — 6370000000 HC RX 637 (ALT 250 FOR IP): Performed by: STUDENT IN AN ORGANIZED HEALTH CARE EDUCATION/TRAINING PROGRAM

## 2019-05-21 PROCEDURE — 83605 ASSAY OF LACTIC ACID: CPT

## 2019-05-21 PROCEDURE — 2580000003 HC RX 258: Performed by: INTERNAL MEDICINE

## 2019-05-21 PROCEDURE — 6360000002 HC RX W HCPCS: Performed by: STUDENT IN AN ORGANIZED HEALTH CARE EDUCATION/TRAINING PROGRAM

## 2019-05-21 PROCEDURE — 99233 SBSQ HOSP IP/OBS HIGH 50: CPT | Performed by: INTERNAL MEDICINE

## 2019-05-21 PROCEDURE — 94640 AIRWAY INHALATION TREATMENT: CPT

## 2019-05-21 PROCEDURE — 94760 N-INVAS EAR/PLS OXIMETRY 1: CPT

## 2019-05-21 PROCEDURE — 2580000003 HC RX 258: Performed by: STUDENT IN AN ORGANIZED HEALTH CARE EDUCATION/TRAINING PROGRAM

## 2019-05-21 PROCEDURE — 80048 BASIC METABOLIC PNL TOTAL CA: CPT

## 2019-05-21 PROCEDURE — 36415 COLL VENOUS BLD VENIPUNCTURE: CPT

## 2019-05-21 PROCEDURE — 6370000000 HC RX 637 (ALT 250 FOR IP): Performed by: HOSPITALIST

## 2019-05-21 RX ORDER — AMLODIPINE BESYLATE 5 MG/1
5 TABLET ORAL DAILY
Qty: 30 TABLET | Refills: 3 | Status: SHIPPED | OUTPATIENT
Start: 2019-05-22 | End: 2019-06-05 | Stop reason: SDUPTHER

## 2019-05-21 RX ORDER — PREDNISONE 20 MG/1
40 TABLET ORAL DAILY
Qty: 6 TABLET | Refills: 0 | Status: SHIPPED | OUTPATIENT
Start: 2019-05-22 | End: 2019-05-25

## 2019-05-21 RX ORDER — GUAIFENESIN 600 MG/1
600 TABLET, EXTENDED RELEASE ORAL 2 TIMES DAILY
Qty: 10 TABLET | Refills: 0 | Status: SHIPPED | OUTPATIENT
Start: 2019-05-21 | End: 2019-09-11

## 2019-05-21 RX ORDER — NICOTINE 21 MG/24HR
1 PATCH, TRANSDERMAL 24 HOURS TRANSDERMAL DAILY
Qty: 30 PATCH | Refills: 3 | Status: SHIPPED | OUTPATIENT
Start: 2019-05-22 | End: 2021-10-09

## 2019-05-21 RX ORDER — LEVOFLOXACIN 500 MG/1
500 TABLET, FILM COATED ORAL DAILY
Qty: 3 TABLET | Refills: 0 | Status: SHIPPED | OUTPATIENT
Start: 2019-05-22 | End: 2019-05-25

## 2019-05-21 RX ORDER — GUAIFENESIN 600 MG/1
600 TABLET, EXTENDED RELEASE ORAL 2 TIMES DAILY
Status: DISCONTINUED | OUTPATIENT
Start: 2019-05-21 | End: 2019-05-21 | Stop reason: HOSPADM

## 2019-05-21 RX ORDER — FLUTICASONE PROPIONATE 50 MCG
2 SPRAY, SUSPENSION (ML) NASAL DAILY
Qty: 1 BOTTLE | Refills: 0 | Status: ON HOLD | OUTPATIENT
Start: 2019-05-22 | End: 2019-11-26 | Stop reason: HOSPADM

## 2019-05-21 RX ADMIN — IPRATROPIUM BROMIDE AND ALBUTEROL SULFATE 1 AMPULE: .5; 3 SOLUTION RESPIRATORY (INHALATION) at 12:50

## 2019-05-21 RX ADMIN — LEVOFLOXACIN 500 MG: 500 TABLET, FILM COATED ORAL at 07:56

## 2019-05-21 RX ADMIN — Medication 10 ML: at 07:57

## 2019-05-21 RX ADMIN — ENOXAPARIN SODIUM 30 MG: 30 INJECTION SUBCUTANEOUS at 07:56

## 2019-05-21 RX ADMIN — GUAIFENESIN 600 MG: 600 TABLET, EXTENDED RELEASE ORAL at 11:24

## 2019-05-21 RX ADMIN — AMLODIPINE BESYLATE 5 MG: 5 TABLET ORAL at 07:56

## 2019-05-21 RX ADMIN — BENZOCAINE, MENTHOL 1 LOZENGE: 15; 3.6 LOZENGE ORAL at 11:25

## 2019-05-21 RX ADMIN — MOMETASONE FUROATE AND FORMOTEROL FUMARATE DIHYDRATE 2 PUFF: 100; 5 AEROSOL RESPIRATORY (INHALATION) at 09:41

## 2019-05-21 RX ADMIN — IPRATROPIUM BROMIDE AND ALBUTEROL SULFATE 1 AMPULE: .5; 3 SOLUTION RESPIRATORY (INHALATION) at 16:18

## 2019-05-21 RX ADMIN — IPRATROPIUM BROMIDE AND ALBUTEROL SULFATE 1 AMPULE: .5; 3 SOLUTION RESPIRATORY (INHALATION) at 09:41

## 2019-05-21 RX ADMIN — PREDNISONE 40 MG: 20 TABLET ORAL at 08:01

## 2019-05-21 RX ADMIN — FLUTICASONE PROPIONATE 2 SPRAY: 50 SPRAY, METERED NASAL at 11:24

## 2019-05-21 RX ADMIN — SODIUM CHLORIDE, PRESERVATIVE FREE 10 ML: 5 INJECTION INTRAVENOUS at 07:56

## 2019-05-21 ASSESSMENT — PAIN SCALES - GENERAL
PAINLEVEL_OUTOF10: 0
PAINLEVEL_OUTOF10: 0

## 2019-05-21 NOTE — DISCHARGE INSTR - COC
Continuity of Care Form    Patient Name: Ole Phan Call   :  1975  MRN:  [id number]    Admit date:  2019  Discharge date:  ***    Code Status Order: Full Code   Advance Directives:   Advance Care Flowsheet Documentation     Date/Time Healthcare Directive Type of Healthcare Directive Copy in [address] Agent's Name Healthcare Agent's Phone Number    [phone]  No, patient does not have an advance directive for healthcare treatment -- -- -- -- --          Admitting Physician:  Eduard Gonzalez MD  PCP: No primary care provider on file. Discharging Nurse: [facility] Unit/Room#:   Discharging Unit Phone Number: ***    Emergency Contact:   Extended Emergency Contact Information  Primary Emergency Contact: Lorena Montoya Phone: [phone]  Relation: Og Villarreal Parent  Secondary Emergency Contact: [facility] Phone: [phone]  Relation: Child    Past Surgical History:  Past Surgical History:   Procedure Laterality Date    AK EXCISION OF BONE, LOWER JAW N/A 3/17/2017    COMPLEX LACERATION REPAIR WITH DEBRIDEMENT performed by Ryan Rodriugez DDS at [place] [address]       Immunization History:   Immunization History   Administered Date(s) Administered    Tdap (Boostrix, Adacel) 2017       Active Problems:  Patient Active Problem List   Diagnosis Code    Dog bite of face S01.85XA, W54. 0XXA    Acute alcohol intoxication ([facility] [address] F10.929    Dog bite of finger S61.259A, W54. 0XXA    COPD exacerbation ([facility] [address] J44.1    Obesity E66.9    Essential hypertension I10    Alcohol abuse F10.10    Smoker F17.200    Non-compliance Z91.19    Cigarette nicotine dependence  F17.210    Lactic acidosis E87.2    Bronchitis J40       Isolation/Infection:   Isolation          No Isolation            Nurse Assessment:  Last Vital Signs: BP (!) 157/85   Pulse 89   Temp 98.2 °F (36.8 °C) (Oral)   Resp 19   Ht 5' 3\" (1.6 m)   Wt 201 lb 8 oz (91.4 kg)   SpO2 98%   BMI 35.69 kg/m²     Last documented pain score (0-10 scale): Pain Level: 0  Last Weight:   Wt Readings from Last 1 Encounters:   19 201 lb 8 oz (91.4 kg)     Mental Status:  {IP PT MENTAL STATUS:21072}    IV Access:  - None    Nursing Mobility/ADLs:  Walking   {[facility] DME TYLS:025474423}  Transfer  {[facility] DME YHGH:194718828}  Bathing  {P DME IAD}  Dressing  {P DME KNKT:981728451}  Toileting  {P DME DUSR:303846755}  Feeding  {[facility] DME DJGZ:812355614}  Med Admin  {[facility] DME RDKB:042957875}  Med Delivery   { [facility] Delivery:907175368}    Wound Care Documentation and Therapy:        Elimination:  Continence:   · Bowel: Yes  · Bladder: Yes  Urinary Catheter: None   Colostomy/Ileostomy/Ileal Conduit: No       Date of Last BM: ***    Intake/Output Summary (Last 24 hours) at 2019 1437  Last data filed at 2019 2200  Gross per 24 hour   Intake 600 ml   Output 700 ml   Net -100 ml     I/O last 3 completed shifts: In: 12 [P.O.:960]  Out: 850 [Urine:850]    Safety Concerns:     None    Impairments/Disabilities:      None    Nutrition Therapy:  Current Nutrition Therapy:   - Oral Diet:  General    Routes of Feeding: Oral  Liquids:  Thin Liquids  Daily Fluid Restriction: no  Last Modified Barium Swallow with Video (Video Swallowing Test): not done    Treatments at the Time of Hospital Discharge:   Respiratory Treatments: ***  Oxygen Therapy:  {Therapy; copd oxygen:45997}  Ventilator:    - No ventilator support    Rehab Therapies: Physical Therapy  Weight Bearing Status/Restrictions: No weight bearing restirctions  Other Medical Equipment (for information only, NOT a DME order):  ***  Other Treatments: ***    Patient's personal belongings (please select all that are sent with patient):  None    RN SIGNATURE:  Electronically signed by Tracy Gilbert RN on 19 at 2:54 PM    CASE MANAGEMENT/SOCIAL WORK SECTION    Inpatient Status Date: ***    Readmission Risk Assessment

## 2019-05-21 NOTE — PROGRESS NOTES
CLINICAL PHARMACY NOTE: MEDS TO [address] [place] [address] Drive Select Patient?: No  Total # of Prescriptions Filled: 5   The following medications were delivered to the patient:  · NICOTINE PATCH   · PREDNISONE   · AMLODIPINE   · LEVAQUIN   · FLONASE   Total # of Interventions Completed: 0  Time Spent (min): 0    Additional Documentation:

## 2019-05-21 NOTE — CARE COORDINATION
Notified Janna with [facility] that patient will DC today.   They will pull info from [facility] Case Management Department  Written by: Francia Carver RN    Patient Name: Claudeen Cotton Call  Attending Provider: Selam Jay MD  Admit Date: 2019  3:42 PM  MRN: [id number]  Account: [de-identified]                     : 1975  Discharge Date:  2019        Disposition: home with [facility], [address] [place], [place] [address] spoke with [facility] ([phone])RS arrange cab, spoke with [facility], they will arrange transportation, confirmation number is [id number]

## 2019-05-21 NOTE — PROGRESS NOTES
levofloxacin  500 mg Oral Daily    fluticasone  2 spray Each Nare Daily    nicotine  1 patch Transdermal Daily    amLODIPine  5 mg Oral Daily    insulin lispro  0-12 Units Subcutaneous TID WC    insulin lispro  0-6 Units Subcutaneous Nightly       PRN Medications    calcium carbonate 500 mg TID PRN   sodium chloride flush 10 mL PRN   acetaminophen 650 mg Q4H PRN   sodium chloride flush 10 mL PRN   potassium chloride 40 mEq PRN   Or     potassium alternative oral replacement 40 mEq PRN   Or     potassium chloride 10 mEq PRN   magnesium sulfate 1 g PRN   magnesium hydroxide 30 mL Daily PRN   ondansetron 4 mg Q6H PRN   albuterol 2.5 mg Q6H PRN   albuterol 2.5 mg As Directed RT PRN   acetaminophen 650 mg Q4H PRN   glucose 15 g PRN   dextrose 12.5 g PRN   glucagon (rDNA) 1 mg PRN   dextrose 100 mL/hr PRN       Diagnostic Labs and Imaging:  CBC:  Recent Labs     05/19/19  1629 05/20/19  0620 05/21/19  0441   WBC 7.1 5.4 13.3*   HGB 15.7* 14.7 15.0    235 265     BMP:   Recent Labs     05/19/19  1629 05/20/19  0620 05/21/19  0441   * 133* 133*   K 4.4 4.1 4.1   CL 91* 94* 96*   CO2 26 26 26   BUN 9 11 17   CREATININE 0.77 0.57 0.72   GLUCOSE 82 188* 115*     Hepatic:   Recent Labs     05/20/19  0620   AST 27   ALT 39*   BILITOT 0.38   ALKPHOS 60       Assessment and Plan:   1. Acute on chronic exacerbation of COPD. Resolved. Continue by mouth prednisone for 4 more doses. 2. Acute Bronchitis. Continue Levaquin 500 mg daily for 5- 7 days  3. Alcohol abuse. Patient counseled,voices  understanding. 4. Nicotine dependence. Continue nicotine patch. 5. Obesity and undiagnosed sleep apnea. BiPAP as needed. Sleep study o/p. Kelly Napoles M.D.   Internal Medicine Resident , PGY-1  [address] [place] [address]  05/21/19 8:38 AM      Attending Physician Statement  I have discussed the case, including pertinent history and exam findings with the resident and the team.  I have seen and examined the patient and the key elements of the encounter have been performed by me. I agree with the assessment, plan and orders as documented by the resident.       Eduard Burdick MD, AISHA, [address]  Attending Physician, Internal Medicine Service    Internal Medicine Residency Program  5/21/2019, 12:12 PM

## 2019-05-22 LAB
ACETAMINOPHEN LEVEL: NORMAL UG/ML
AMPHETAMINE: NEGATIVE NG/ML
BARBITURATES: NEGATIVE NG/ML
BENZODIAZEPINES: NEGATIVE NG/ML
BUPRENORPHINE: NEGATIVE NG/ML
COCAINE: NEGATIVE NG/ML
DRUGS OF ABUSE COMMENT: NORMAL
ETHANOL PERCENT: NORMAL %
ETHANOL: NORMAL MG/DL
METHADONE: NEGATIVE NG/ML
METHAMPHETAMINE: NEGATIVE NG/ML
OPIATES: NEGATIVE NG/ML
OXYCODONE: NEGATIVE NG/ML
PHENCYCLIDINE: NEGATIVE NG/ML
SALICYLATE LEVEL: NORMAL MG/DL
THC: POSITIVE NG/ML
TOXIC TRICYCLIC SC,BLOOD: NORMAL

## 2019-05-23 LAB
EKG ATRIAL RATE: 84 BPM
EKG P AXIS: 49 DEGREES
EKG P-R INTERVAL: 150 MS
EKG Q-T INTERVAL: 348 MS
EKG QRS DURATION: 68 MS
EKG QTC CALCULATION (BAZETT): 411 MS
EKG R AXIS: -11 DEGREES
EKG T AXIS: 25 DEGREES
EKG VENTRICULAR RATE: 84 BPM

## 2019-05-24 LAB — CANNABINOID, BLOOD: 72 NG/ML

## 2019-06-05 ENCOUNTER — OFFICE VISIT (OUTPATIENT)
Dept: INTERNAL MEDICINE | Age: 44
End: 2019-06-05
Payer: OTHER GOVERNMENT

## 2019-06-05 VITALS
HEIGHT: 63 IN | SYSTOLIC BLOOD PRESSURE: 132 MMHG | DIASTOLIC BLOOD PRESSURE: 91 MMHG | BODY MASS INDEX: 35.61 KG/M2 | WEIGHT: 201 LBS | HEART RATE: 71 BPM

## 2019-06-05 DIAGNOSIS — Z76.89 ENCOUNTER TO ESTABLISH CARE: ICD-10-CM

## 2019-06-05 DIAGNOSIS — R19.7 DIARRHEA, UNSPECIFIED TYPE: ICD-10-CM

## 2019-06-05 DIAGNOSIS — J44.9 CHRONIC OBSTRUCTIVE PULMONARY DISEASE, UNSPECIFIED COPD TYPE (HCC): Primary | ICD-10-CM

## 2019-06-05 DIAGNOSIS — F10.10 ALCOHOL ABUSE: ICD-10-CM

## 2019-06-05 DIAGNOSIS — F17.200 SMOKER: ICD-10-CM

## 2019-06-05 DIAGNOSIS — Z23 NEED FOR VACCINATION AGAINST STREPTOCOCCUS PNEUMONIAE: ICD-10-CM

## 2019-06-05 DIAGNOSIS — Z00.00 HEALTH CARE MAINTENANCE: ICD-10-CM

## 2019-06-05 DIAGNOSIS — R30.9 PAINFUL URINATION: ICD-10-CM

## 2019-06-05 DIAGNOSIS — I10 ESSENTIAL HYPERTENSION: ICD-10-CM

## 2019-06-05 DIAGNOSIS — M54.32 LEFT SIDED SCIATICA: ICD-10-CM

## 2019-06-05 LAB
BILIRUBIN, POC: ABNORMAL
BLOOD URINE, POC: ABNORMAL
CLARITY, POC: CLEAR
COLOR, POC: ABNORMAL
GLUCOSE URINE, POC: ABNORMAL
KETONES, POC: ABNORMAL
LEUKOCYTE EST, POC: ABNORMAL
NITRITE, POC: ABNORMAL
PH, POC: 6
PROTEIN, POC: ABNORMAL
SPECIFIC GRAVITY, POC: 1.03
UROBILINOGEN, POC: 0.2

## 2019-06-05 PROCEDURE — 4004F PT TOBACCO SCREEN RCVD TLK: CPT | Performed by: STUDENT IN AN ORGANIZED HEALTH CARE EDUCATION/TRAINING PROGRAM

## 2019-06-05 PROCEDURE — 1111F DSCHRG MED/CURRENT MED MERGE: CPT | Performed by: STUDENT IN AN ORGANIZED HEALTH CARE EDUCATION/TRAINING PROGRAM

## 2019-06-05 PROCEDURE — 99211 OFF/OP EST MAY X REQ PHY/QHP: CPT | Performed by: INTERNAL MEDICINE

## 2019-06-05 PROCEDURE — 90732 PPSV23 VACC 2 YRS+ SUBQ/IM: CPT | Performed by: INTERNAL MEDICINE

## 2019-06-05 PROCEDURE — G8417 CALC BMI ABV UP PARAM F/U: HCPCS | Performed by: STUDENT IN AN ORGANIZED HEALTH CARE EDUCATION/TRAINING PROGRAM

## 2019-06-05 PROCEDURE — 3023F SPIROM DOC REV: CPT | Performed by: STUDENT IN AN ORGANIZED HEALTH CARE EDUCATION/TRAINING PROGRAM

## 2019-06-05 PROCEDURE — G8427 DOCREV CUR MEDS BY ELIG CLIN: HCPCS | Performed by: STUDENT IN AN ORGANIZED HEALTH CARE EDUCATION/TRAINING PROGRAM

## 2019-06-05 PROCEDURE — 81003 URINALYSIS AUTO W/O SCOPE: CPT | Performed by: STUDENT IN AN ORGANIZED HEALTH CARE EDUCATION/TRAINING PROGRAM

## 2019-06-05 PROCEDURE — 99204 OFFICE O/P NEW MOD 45 MIN: CPT | Performed by: STUDENT IN AN ORGANIZED HEALTH CARE EDUCATION/TRAINING PROGRAM

## 2019-06-05 PROCEDURE — G8926 SPIRO NO PERF OR DOC: HCPCS | Performed by: STUDENT IN AN ORGANIZED HEALTH CARE EDUCATION/TRAINING PROGRAM

## 2019-06-05 RX ORDER — BUDESONIDE AND FORMOTEROL FUMARATE DIHYDRATE 160; 4.5 UG/1; UG/1
2 AEROSOL RESPIRATORY (INHALATION) 2 TIMES DAILY
Qty: 3 INHALER | Refills: 1 | Status: SHIPPED | OUTPATIENT
Start: 2019-06-05 | End: 2021-10-09

## 2019-06-05 RX ORDER — ALBUTEROL SULFATE 90 UG/1
1-2 AEROSOL, METERED RESPIRATORY (INHALATION) EVERY 4 HOURS PRN
Qty: 1 INHALER | Refills: 0 | Status: SHIPPED | OUTPATIENT
Start: 2019-06-05 | End: 2019-07-17 | Stop reason: SDUPTHER

## 2019-06-05 RX ORDER — AMLODIPINE BESYLATE 10 MG/1
10 TABLET ORAL DAILY
Qty: 90 TABLET | Refills: 0 | Status: SHIPPED | OUTPATIENT
Start: 2019-06-05 | End: 2021-10-09

## 2019-06-05 ASSESSMENT — PATIENT HEALTH QUESTIONNAIRE - PHQ9
SUM OF ALL RESPONSES TO PHQ9 QUESTIONS 1 & 2: 0
SUM OF ALL RESPONSES TO PHQ QUESTIONS 1-9: 0
SUM OF ALL RESPONSES TO PHQ QUESTIONS 1-9: 0
1. LITTLE INTEREST OR PLEASURE IN DOING THINGS: 0
2. FEELING DOWN, DEPRESSED OR HOPELESS: 0

## 2019-06-05 NOTE — PATIENT INSTRUCTIONS
Patient Education        Kegel Exercises: Care Instructions  Your Care Instructions    Kegel exercises strengthen muscles around the bladder. These muscles control the flow of urine. Kegel exercises are sometime called \"pelvic floor\" exercises. They can help prevent urine leakage and keep the pelvic organs in place. A woman who just had a baby might want to try Kegel exercises. They can strengthen pelvic muscles that have been weakened by pregnancy and childbirth. A man or woman may use Kegel exercises to treat urine leakage. You do Kegel exercises by tightening the muscles you use when you urinate. You will likely need to do these exercises for several weeks to get better. Follow-up care is a key part of your treatment and safety. Be sure to make and go to all appointments, and call your doctor if you are having problems. It's also a good idea to know your test results and keep a list of the medicines you take. How can you care for yourself at home? · Do Kegel exercises. ? Find the muscles you need to strengthen. To do this, tighten the muscles that stop your urine while you are going to the bathroom. These are the same muscles you squeeze during Kegel exercises. ? Squeeze the muscles as hard as you can. Your belly and thighs should not move. ? Hold the squeeze for 3 seconds. Then relax for 3 seconds. ? Start with 3 seconds, and then add 1 second each week until you are able to squeeze for 10 seconds. ? Repeat the exercise 10 to 15 times for each session. Do three or more sessions each day. · You can check to see if you are using the right muscles. Place a finger in your vagina and squeeze around it. You are doing them right when you feel pressure around your finger. Your doctor may also suggest that you put special weights in your vagina while you do the exercises. · Do not smoke. It can irritate the bladder. If you need help quitting, talk to your doctor about stop-smoking programs and medicines.

## 2019-06-05 NOTE — PROGRESS NOTES
[facility]    New Patient Note/History and Physical    Date of patient's visit: 6/5/2019    Name:  Anne Coppola      YOB: 1975    Patient Care Team:  [address] Juan Carlos Shankar MD as PCP - General (Internal Medicine)    REASON FOR VISIT: First Visit, establish care     HISTORY OF PRESENTING ILLNESS:    History was obtained from the patient. Anne Coppola is a 53 y.o. female with history of essential hypertension, COPD, smoker, alcohol abuse is here to establish care. Patient was admitted to hospital on 5/19/2019 for persistent shortness of breath, cough. Was treated for COPD exacerbation with antibiotics and steroids. She was discharged on Levaquin. Patient completed the course of antibiotics and steroids. She was not discharged on any inhalers. O2 saturation remained 98% at rest and on exercise in clinic      Reported that she is feeling the same as she felt in the hospital.  Stated that she is having shortness of breath, cough, nasal congestion. Took albuterol inhaler from friend and using it currently. Denied any fever, chills, nausea, vomiting, weakness, tingling or numbness. She is complaining of dysuria since 2 days and lower abdominal pain. POCT UA negative. Complaining of stress and urge incontinence. Also having diarrhea since 4 days, 4 loose stools per day, foul smelling. Her blood pressure today is 138/99, repeat 132/90, on Norvasc 5 mg daily     Also does have chronic left sided sciatica, because of pain she is drinking more alcohol     Smoking 1 pack in 2.5 days since 10 days, previously used to smoke 1 pack/day. Smoked 34 years. Drinks 6 cans of beers per day  Denied any illicit drug use. Used to follow with psychiatry in [place] for multiple suicidal attempts. Used to take zyprexa. Currently denying any homicidal or suicidal ideation. Refusing to see a psychiatry.      Family history of breast cancer in half sister in her 4.1 05/21/2019    CL 96 05/21/2019    CO2 26 05/21/2019    BUN 17 05/21/2019    CREATININE 0.72 05/21/2019    GLUCOSE 115 05/21/2019     Hemoglobin A1C: No results found for: LABA1C  Lipid profile: No results found for: CHOL, TRIG, HDL  Thyroid functions: No results found for: TSH   Hepatic functions:   Lab Results   Component Value Date    ALT 39 05/20/2019    AST 27 05/20/2019    PROT 6.8 05/20/2019    BILITOT 0.38 05/20/2019    BILIDIR 0.12 05/20/2019    LABALBU 4.0 05/20/2019     ASSESSMENT AND PLAN:   Urbano Mejia was seen today for establish care, copd and health maintenance. Diagnoses and all orders for this visit:    Chronic obstructive pulmonary disease, unspecified COPD type ([facility] [address]  Will order albuterol inhaler as needed, Symbicort 2 puffs twice daily  Advised on smoking cessation  Does have nicotine patches  O2 saturation remained at 98% at rest and exercise     Essential hypertension  Increase norvasc to 10 mg daily     Painful urination  -     POCT Urinalysis No Micro (Auto) -negative  - keigel exercises    Need for vaccination against Streptococcus pneumoniae  -     Pneumococcal polysaccharide vaccine 23-valent greater than or equal to 3yo subcutaneous/IM  -     ND IMMUNIZ ADMIN,1 SINGLE/COMB VAC/TOXOID    Encounter to establish care    Smoker    Alcohol abuse    Diarrhea, unspecified type  Will order C. diff toxin as patient was recently hospitalized and was on antibiotics    Chronic Left sided sciatica  X ray lumbar spine and referral to physical therapy    Health care maintenance  Order lipid panel, hemoglobin A1c, HIV, TSH, mammogram, nebulizer, shower chair, cane     Follow up in 6 weeks for HTN and COPD      INSTRUCTIONS:   · No follow-ups on file. · Urbano Mejia received counseling on the following healthy behaviors: nutrition, exercise, medication adherence, tobacco cessation and decrease in alcohol consumption    · Reviewed prior labs and health maintenance.       · Discussed use, benefit, and side effects of prescribed medications. Barriers to medication compliance addressed. All patient questions answered. Pt voiced understanding. · Patient given educational materials - see patient instructions    Debbie Phipps MD,   PGY-2 Internal Medicine Resident. [facility]. 6/5/2019  9:45 AM    Attending Physician Statement Kelly Reynaga)        I have discussed the care of Millicent Coppola, including pertinent history and exam findings, with the resident. I have seen and examined the patient and the key elements of all parts of the encounter have been performed by me. I agree with the assessment, plan and orders as documented by the resident. Renata Paredes M.D.   Faculty, Internal Medicine Residency Program  [place]  6/5/2019, 10:27 AM

## 2019-06-10 ENCOUNTER — TELEPHONE (OUTPATIENT)
Dept: INTERNAL MEDICINE | Age: 44
End: 2019-06-10

## 2019-06-10 DIAGNOSIS — J44.9 CHRONIC OBSTRUCTIVE PULMONARY DISEASE, UNSPECIFIED COPD TYPE (HCC): ICD-10-CM

## 2019-06-10 NOTE — TELEPHONE ENCOUNTER
Can you reprint the scripts and fax to [facility]? Please let me know if you need anything from me.  Thanks

## 2019-06-10 NOTE — TELEPHONE ENCOUNTER
Patient calling in because scripts were faxed to [facility] and the do not have DME Equipment. Patient would like printed scripts faxed to [facility] at [phone], please advise.

## 2019-06-11 NOTE — TELEPHONE ENCOUNTER
Pt calling again about nebulizer machine. Pt was told that the doctor will be in tomorrow and the office fax the order.

## 2019-06-11 NOTE — TELEPHONE ENCOUNTER
Orders reprinted for shower chair along with progress notes,  Please reprint pended order for nebulizer tomorrow and give to Monico Hurley

## 2019-06-14 NOTE — TELEPHONE ENCOUNTER
Pt called [facility] calling them again that scripts are not correct and they can not fill them. Writer called [facility] at [phone] and was told scripts can not be from a resident MD it has to be from an attending.

## 2019-06-15 ENCOUNTER — APPOINTMENT (OUTPATIENT)
Dept: GENERAL RADIOLOGY | Age: 44
DRG: 115 | End: 2019-06-15
Payer: MEDICAID

## 2019-06-15 ENCOUNTER — HOSPITAL ENCOUNTER (INPATIENT)
Age: 44
LOS: 3 days | Discharge: HOME | DRG: 115 | End: 2019-06-18
Attending: EMERGENCY MEDICINE | Admitting: INTERNAL MEDICINE
Payer: MEDICAID

## 2019-06-15 ENCOUNTER — TELEPHONE (OUTPATIENT)
Dept: INTERNAL MEDICINE CLINIC | Age: 44
End: 2019-06-15

## 2019-06-15 DIAGNOSIS — J38.1 VOCAL CORD POLYPS: Primary | ICD-10-CM

## 2019-06-15 DIAGNOSIS — R06.1 INSPIRATORY STRIDOR: ICD-10-CM

## 2019-06-15 DIAGNOSIS — J44.9 CHRONIC OBSTRUCTIVE PULMONARY DISEASE, UNSPECIFIED COPD TYPE (HCC): ICD-10-CM

## 2019-06-15 LAB
-: ABNORMAL
ABSOLUTE EOS #: 0.14 K/UL (ref 0–0.44)
ABSOLUTE IMMATURE GRANULOCYTE: <0.03 K/UL (ref 0–0.3)
ABSOLUTE LYMPH #: 2.84 K/UL (ref 1.1–3.7)
ABSOLUTE MONO #: 0.54 K/UL (ref 0.1–1.2)
ALBUMIN SERPL-MCNC: 4.1 G/DL (ref 3.5–5.2)
ALBUMIN/GLOBULIN RATIO: 1.4 (ref 1–2.5)
ALLEN TEST: ABNORMAL
ALP BLD-CCNC: 74 U/L (ref 35–104)
ALT SERPL-CCNC: 27 U/L (ref 5–33)
AMORPHOUS: ABNORMAL
AMPHETAMINE SCREEN URINE: NEGATIVE
ANION GAP SERPL CALCULATED.3IONS-SCNC: 12 MMOL/L (ref 9–17)
ANION GAP: 8 MMOL/L (ref 7–16)
AST SERPL-CCNC: 27 U/L
BACTERIA: ABNORMAL
BARBITURATE SCREEN URINE: NEGATIVE
BASOPHILS # BLD: 0 % (ref 0–2)
BASOPHILS ABSOLUTE: 0.03 K/UL (ref 0–0.2)
BENZODIAZEPINE SCREEN, URINE: POSITIVE
BILIRUB SERPL-MCNC: 0.36 MG/DL (ref 0.3–1.2)
BILIRUBIN DIRECT: 0.09 MG/DL
BILIRUBIN URINE: NEGATIVE
BILIRUBIN, INDIRECT: 0.27 MG/DL (ref 0–1)
BUN BLDV-MCNC: 10 MG/DL (ref 6–20)
BUN/CREAT BLD: ABNORMAL (ref 9–20)
BUPRENORPHINE URINE: ABNORMAL
CALCIUM SERPL-MCNC: 8.4 MG/DL (ref 8.6–10.4)
CANNABINOID SCREEN URINE: POSITIVE
CASTS UA: ABNORMAL /LPF (ref 0–8)
CHLORIDE BLD-SCNC: 96 MMOL/L (ref 98–107)
CO2: 25 MMOL/L (ref 20–31)
COCAINE METABOLITE, URINE: NEGATIVE
COLOR: YELLOW
CREAT SERPL-MCNC: 0.63 MG/DL (ref 0.5–0.9)
CRYSTALS, UA: ABNORMAL /HPF
DIFFERENTIAL TYPE: NORMAL
EOSINOPHILS RELATIVE PERCENT: 2 % (ref 1–4)
EPITHELIAL CELLS UA: ABNORMAL /HPF (ref 0–5)
FIO2: ABNORMAL
GFR AFRICAN AMERICAN: >60 ML/MIN
GFR NON-AFRICAN AMERICAN: >60 ML/MIN
GFR NON-AFRICAN AMERICAN: >60 ML/MIN
GFR SERPL CREATININE-BSD FRML MDRD: >60 ML/MIN
GFR SERPL CREATININE-BSD FRML MDRD: ABNORMAL ML/MIN/{1.73_M2}
GFR SERPL CREATININE-BSD FRML MDRD: ABNORMAL ML/MIN/{1.73_M2}
GFR SERPL CREATININE-BSD FRML MDRD: NORMAL ML/MIN/{1.73_M2}
GLOBULIN: NORMAL G/DL (ref 1.5–3.8)
GLUCOSE BLD-MCNC: 100 MG/DL (ref 70–99)
GLUCOSE BLD-MCNC: 102 MG/DL (ref 74–100)
GLUCOSE BLD-MCNC: 144 MG/DL (ref 65–105)
GLUCOSE URINE: NEGATIVE
HCO3 VENOUS: 28.5 MMOL/L (ref 22–29)
HCT VFR BLD CALC: 43.5 % (ref 36.3–47.1)
HEMOGLOBIN: 14.4 G/DL (ref 11.9–15.1)
IMMATURE GRANULOCYTES: 0 %
INR BLD: 0.9
KETONES, URINE: ABNORMAL
LACTIC ACID, WHOLE BLOOD: 0.8 MMOL/L (ref 0.7–2.1)
LACTIC ACID: NORMAL MMOL/L
LEUKOCYTE ESTERASE, URINE: NEGATIVE
LYMPHOCYTES # BLD: 39 % (ref 24–43)
MCH RBC QN AUTO: 32.5 PG (ref 25.2–33.5)
MCHC RBC AUTO-ENTMCNC: 33.1 G/DL (ref 28.4–34.8)
MCV RBC AUTO: 98.2 FL (ref 82.6–102.9)
MDMA URINE: ABNORMAL
METHADONE SCREEN, URINE: NEGATIVE
METHAMPHETAMINE, URINE: ABNORMAL
MODE: ABNORMAL
MONOCYTES # BLD: 8 % (ref 3–12)
MUCUS: ABNORMAL
NEGATIVE BASE EXCESS, VEN: ABNORMAL (ref 0–2)
NITRITE, URINE: NEGATIVE
NRBC AUTOMATED: 0 PER 100 WBC
O2 DEVICE/FLOW/%: ABNORMAL
O2 SAT, VEN: 87 % (ref 60–85)
OPIATES, URINE: NEGATIVE
OTHER OBSERVATIONS UA: ABNORMAL
OXYCODONE SCREEN URINE: NEGATIVE
PATIENT TEMP: ABNORMAL
PCO2, VEN: 43.9 MM HG (ref 41–51)
PDW BLD-RTO: 13.5 % (ref 11.8–14.4)
PH UA: 6 (ref 5–8)
PH VENOUS: 7.42 (ref 7.32–7.43)
PHENCYCLIDINE, URINE: NEGATIVE
PLATELET # BLD: 273 K/UL (ref 138–453)
PLATELET ESTIMATE: NORMAL
PMV BLD AUTO: 9.2 FL (ref 8.1–13.5)
PO2, VEN: 52.8 MM HG (ref 30–50)
POC CHLORIDE: 97 MMOL/L (ref 98–107)
POC CREATININE: 0.67 MG/DL (ref 0.51–1.19)
POC HEMATOCRIT: 48 % (ref 36–46)
POC HEMOGLOBIN: 16.2 G/DL (ref 12–16)
POC IONIZED CALCIUM: 1.04 MMOL/L (ref 1.15–1.33)
POC LACTIC ACID: 0.73 MMOL/L (ref 0.56–1.39)
POC PCO2 TEMP: ABNORMAL MM HG
POC PH TEMP: ABNORMAL
POC PO2 TEMP: ABNORMAL MM HG
POC POTASSIUM: 4.1 MMOL/L (ref 3.5–4.5)
POC SODIUM: 133 MMOL/L (ref 138–146)
POSITIVE BASE EXCESS, VEN: 3 (ref 0–3)
POTASSIUM SERPL-SCNC: 4.1 MMOL/L (ref 3.7–5.3)
PROPOXYPHENE, URINE: ABNORMAL
PROTEIN UA: NEGATIVE
PROTHROMBIN TIME: 9.8 SEC (ref 9–12)
RBC # BLD: 4.43 M/UL (ref 3.95–5.11)
RBC # BLD: NORMAL 10*6/UL
RBC UA: ABNORMAL /HPF (ref 0–4)
RENAL EPITHELIAL, UA: ABNORMAL /HPF
SAMPLE SITE: ABNORMAL
SEG NEUTROPHILS: 51 % (ref 36–65)
SEGMENTED NEUTROPHILS ABSOLUTE COUNT: 3.65 K/UL (ref 1.5–8.1)
SODIUM BLD-SCNC: 133 MMOL/L (ref 135–144)
SPECIFIC GRAVITY UA: 1.02 (ref 1–1.03)
TEST INFORMATION: ABNORMAL
TOTAL CO2, VENOUS: 30 MMOL/L (ref 23–30)
TOTAL PROTEIN: 7.1 G/DL (ref 6.4–8.3)
TRICHOMONAS: ABNORMAL
TRICYCLIC ANTIDEPRESSANTS, UR: ABNORMAL
TURBIDITY: CLEAR
URINE HGB: NEGATIVE
UROBILINOGEN, URINE: NORMAL
WBC # BLD: 7.2 K/UL (ref 3.5–11.3)
WBC # BLD: NORMAL 10*3/UL
WBC UA: ABNORMAL /HPF (ref 0–5)
YEAST: ABNORMAL

## 2019-06-15 PROCEDURE — 70360 X-RAY EXAM OF NECK: CPT

## 2019-06-15 PROCEDURE — 6360000002 HC RX W HCPCS: Performed by: STUDENT IN AN ORGANIZED HEALTH CARE EDUCATION/TRAINING PROGRAM

## 2019-06-15 PROCEDURE — 94640 AIRWAY INHALATION TREATMENT: CPT

## 2019-06-15 PROCEDURE — 83605 ASSAY OF LACTIC ACID: CPT

## 2019-06-15 PROCEDURE — 93005 ELECTROCARDIOGRAM TRACING: CPT | Performed by: EMERGENCY MEDICINE

## 2019-06-15 PROCEDURE — 82803 BLOOD GASES ANY COMBINATION: CPT

## 2019-06-15 PROCEDURE — 80307 DRUG TEST PRSMV CHEM ANLYZR: CPT

## 2019-06-15 PROCEDURE — 85610 PROTHROMBIN TIME: CPT

## 2019-06-15 PROCEDURE — 82330 ASSAY OF CALCIUM: CPT

## 2019-06-15 PROCEDURE — 80048 BASIC METABOLIC PNL TOTAL CA: CPT

## 2019-06-15 PROCEDURE — 85014 HEMATOCRIT: CPT

## 2019-06-15 PROCEDURE — 99254 IP/OBS CNSLTJ NEW/EST MOD 60: CPT | Performed by: INTERNAL MEDICINE

## 2019-06-15 PROCEDURE — 6370000000 HC RX 637 (ALT 250 FOR IP): Performed by: STUDENT IN AN ORGANIZED HEALTH CARE EDUCATION/TRAINING PROGRAM

## 2019-06-15 PROCEDURE — 0CJY8ZZ INSPECTION OF MOUTH AND THROAT, VIA NATURAL OR ARTIFICIAL OPENING ENDOSCOPIC: ICD-10-PCS | Performed by: EMERGENCY MEDICINE

## 2019-06-15 PROCEDURE — 82947 ASSAY GLUCOSE BLOOD QUANT: CPT

## 2019-06-15 PROCEDURE — 84132 ASSAY OF SERUM POTASSIUM: CPT

## 2019-06-15 PROCEDURE — 85025 COMPLETE CBC W/AUTO DIFF WBC: CPT

## 2019-06-15 PROCEDURE — 2500000003 HC RX 250 WO HCPCS: Performed by: EMERGENCY MEDICINE

## 2019-06-15 PROCEDURE — 1200000000 HC SEMI PRIVATE

## 2019-06-15 PROCEDURE — 80076 HEPATIC FUNCTION PANEL: CPT

## 2019-06-15 PROCEDURE — 82565 ASSAY OF CREATININE: CPT

## 2019-06-15 PROCEDURE — 99285 EMERGENCY DEPT VISIT HI MDM: CPT

## 2019-06-15 PROCEDURE — 82435 ASSAY OF BLOOD CHLORIDE: CPT

## 2019-06-15 PROCEDURE — 81001 URINALYSIS AUTO W/SCOPE: CPT

## 2019-06-15 PROCEDURE — 36415 COLL VENOUS BLD VENIPUNCTURE: CPT

## 2019-06-15 PROCEDURE — 6360000002 HC RX W HCPCS: Performed by: EMERGENCY MEDICINE

## 2019-06-15 PROCEDURE — 84295 ASSAY OF SERUM SODIUM: CPT

## 2019-06-15 PROCEDURE — 2580000003 HC RX 258: Performed by: STUDENT IN AN ORGANIZED HEALTH CARE EDUCATION/TRAINING PROGRAM

## 2019-06-15 RX ORDER — ONDANSETRON 2 MG/ML
4 INJECTION INTRAMUSCULAR; INTRAVENOUS EVERY 6 HOURS PRN
Status: DISCONTINUED | OUTPATIENT
Start: 2019-06-15 | End: 2019-06-18 | Stop reason: HOSPADM

## 2019-06-15 RX ORDER — POTASSIUM CHLORIDE 7.45 MG/ML
10 INJECTION INTRAVENOUS PRN
Status: DISCONTINUED | OUTPATIENT
Start: 2019-06-15 | End: 2019-06-18 | Stop reason: HOSPADM

## 2019-06-15 RX ORDER — AMLODIPINE BESYLATE 10 MG/1
10 TABLET ORAL DAILY
Status: DISCONTINUED | OUTPATIENT
Start: 2019-06-15 | End: 2019-06-18 | Stop reason: HOSPADM

## 2019-06-15 RX ORDER — SODIUM CHLORIDE 0.9 % (FLUSH) 0.9 %
10 SYRINGE (ML) INJECTION EVERY 12 HOURS SCHEDULED
Status: DISCONTINUED | OUTPATIENT
Start: 2019-06-15 | End: 2019-06-18 | Stop reason: HOSPADM

## 2019-06-15 RX ORDER — POTASSIUM CHLORIDE 20 MEQ/1
40 TABLET, EXTENDED RELEASE ORAL PRN
Status: DISCONTINUED | OUTPATIENT
Start: 2019-06-15 | End: 2019-06-18 | Stop reason: HOSPADM

## 2019-06-15 RX ORDER — MIDAZOLAM HYDROCHLORIDE 1 MG/ML
0.5 INJECTION INTRAMUSCULAR; INTRAVENOUS ONCE
Status: COMPLETED | OUTPATIENT
Start: 2019-06-15 | End: 2019-06-15

## 2019-06-15 RX ORDER — LIDOCAINE HYDROCHLORIDE 40 MG/ML
4 INJECTION, SOLUTION RETROBULBAR; TOPICAL ONCE
Status: COMPLETED | OUTPATIENT
Start: 2019-06-15 | End: 2019-06-15

## 2019-06-15 RX ORDER — SODIUM CHLORIDE 9 MG/ML
INJECTION, SOLUTION INTRAVENOUS CONTINUOUS
Status: DISCONTINUED | OUTPATIENT
Start: 2019-06-15 | End: 2019-06-18

## 2019-06-15 RX ORDER — ALBUTEROL SULFATE 90 UG/1
2 AEROSOL, METERED RESPIRATORY (INHALATION) EVERY 4 HOURS PRN
Status: DISCONTINUED | OUTPATIENT
Start: 2019-06-15 | End: 2019-06-18 | Stop reason: HOSPADM

## 2019-06-15 RX ORDER — CLINDAMYCIN PHOSPHATE 600 MG/50ML
600 INJECTION INTRAVENOUS EVERY 8 HOURS
Status: DISCONTINUED | OUTPATIENT
Start: 2019-06-16 | End: 2019-06-18

## 2019-06-15 RX ORDER — LABETALOL HYDROCHLORIDE 5 MG/ML
10 INJECTION, SOLUTION INTRAVENOUS EVERY 4 HOURS PRN
Status: DISCONTINUED | OUTPATIENT
Start: 2019-06-15 | End: 2019-06-18 | Stop reason: HOSPADM

## 2019-06-15 RX ORDER — NICOTINE 21 MG/24HR
1 PATCH, TRANSDERMAL 24 HOURS TRANSDERMAL DAILY
Status: DISCONTINUED | OUTPATIENT
Start: 2019-06-16 | End: 2019-06-18 | Stop reason: HOSPADM

## 2019-06-15 RX ORDER — MULTIVITAMIN WITH FOLIC ACID 400 MCG
1 TABLET ORAL DAILY
Status: DISCONTINUED | OUTPATIENT
Start: 2019-06-16 | End: 2019-06-18 | Stop reason: HOSPADM

## 2019-06-15 RX ORDER — DEXAMETHASONE SODIUM PHOSPHATE 10 MG/ML
10 INJECTION INTRAMUSCULAR; INTRAVENOUS EVERY 8 HOURS
Status: DISCONTINUED | OUTPATIENT
Start: 2019-06-15 | End: 2019-06-18

## 2019-06-15 RX ORDER — MAGNESIUM SULFATE 1 G/100ML
1 INJECTION INTRAVENOUS PRN
Status: DISCONTINUED | OUTPATIENT
Start: 2019-06-15 | End: 2019-06-18 | Stop reason: HOSPADM

## 2019-06-15 RX ORDER — FOLIC ACID 1 MG/1
1 TABLET ORAL DAILY
Status: DISCONTINUED | OUTPATIENT
Start: 2019-06-16 | End: 2019-06-18 | Stop reason: HOSPADM

## 2019-06-15 RX ORDER — THIAMINE MONONITRATE (VIT B1) 100 MG
100 TABLET ORAL DAILY
Status: DISCONTINUED | OUTPATIENT
Start: 2019-06-16 | End: 2019-06-18 | Stop reason: HOSPADM

## 2019-06-15 RX ORDER — CLINDAMYCIN PHOSPHATE 600 MG/50ML
600 INJECTION INTRAVENOUS ONCE
Status: COMPLETED | OUTPATIENT
Start: 2019-06-15 | End: 2019-06-15

## 2019-06-15 RX ORDER — DEXAMETHASONE SODIUM PHOSPHATE 10 MG/ML
10 INJECTION INTRAMUSCULAR; INTRAVENOUS ONCE
Status: COMPLETED | OUTPATIENT
Start: 2019-06-15 | End: 2019-06-15

## 2019-06-15 RX ORDER — BUDESONIDE AND FORMOTEROL FUMARATE DIHYDRATE 160; 4.5 UG/1; UG/1
2 AEROSOL RESPIRATORY (INHALATION) 2 TIMES DAILY
Status: DISCONTINUED | OUTPATIENT
Start: 2019-06-15 | End: 2019-06-15

## 2019-06-15 RX ORDER — FAMOTIDINE 20 MG/1
20 TABLET, FILM COATED ORAL 2 TIMES DAILY
Status: DISCONTINUED | OUTPATIENT
Start: 2019-06-15 | End: 2019-06-18 | Stop reason: HOSPADM

## 2019-06-15 RX ORDER — ACETAMINOPHEN 325 MG/1
650 TABLET ORAL EVERY 4 HOURS PRN
Status: DISCONTINUED | OUTPATIENT
Start: 2019-06-15 | End: 2019-06-18 | Stop reason: HOSPADM

## 2019-06-15 RX ORDER — AMLODIPINE BESYLATE 10 MG/1
10 TABLET ORAL DAILY
Status: DISCONTINUED | OUTPATIENT
Start: 2019-06-16 | End: 2019-06-15

## 2019-06-15 RX ORDER — SODIUM CHLORIDE 0.9 % (FLUSH) 0.9 %
10 SYRINGE (ML) INJECTION PRN
Status: DISCONTINUED | OUTPATIENT
Start: 2019-06-15 | End: 2019-06-18 | Stop reason: HOSPADM

## 2019-06-15 RX ADMIN — DEXAMETHASONE SODIUM PHOSPHATE 10 MG: 10 INJECTION INTRAMUSCULAR; INTRAVENOUS at 19:31

## 2019-06-15 RX ADMIN — SODIUM CHLORIDE: 9 INJECTION, SOLUTION INTRAVENOUS at 22:14

## 2019-06-15 RX ADMIN — CLINDAMYCIN PHOSPHATE 600 MG: 600 INJECTION, SOLUTION INTRAVENOUS at 19:31

## 2019-06-15 RX ADMIN — LIDOCAINE HYDROCHLORIDE 4 ML: 40 INJECTION, SOLUTION RETROBULBAR; TOPICAL at 18:30

## 2019-06-15 RX ADMIN — MIDAZOLAM HYDROCHLORIDE 0.5 MG: 1 INJECTION, SOLUTION INTRAMUSCULAR; INTRAVENOUS at 18:14

## 2019-06-15 RX ADMIN — DEXAMETHASONE SODIUM PHOSPHATE 10 MG: 10 INJECTION INTRAMUSCULAR; INTRAVENOUS at 22:14

## 2019-06-15 RX ADMIN — AMLODIPINE BESYLATE 10 MG: 10 TABLET ORAL at 22:47

## 2019-06-15 RX ADMIN — FAMOTIDINE 20 MG: 20 TABLET, FILM COATED ORAL at 22:14

## 2019-06-15 RX ADMIN — Medication 10 ML: at 22:14

## 2019-06-15 SDOH — HEALTH STABILITY: MENTAL HEALTH: HOW MANY STANDARD DRINKS CONTAINING ALCOHOL DO YOU HAVE ON A TYPICAL DAY?: 5 OR 6

## 2019-06-15 ASSESSMENT — PAIN DESCRIPTION - PROGRESSION: CLINICAL_PROGRESSION: NOT CHANGED

## 2019-06-15 ASSESSMENT — ENCOUNTER SYMPTOMS
CHEST TIGHTNESS: 0
SINUS PAIN: 0
SHORTNESS OF BREATH: 1
NAUSEA: 0
SINUS PRESSURE: 0
BACK PAIN: 0
VOMITING: 0
ABDOMINAL PAIN: 0
COUGH: 0
STRIDOR: 1

## 2019-06-15 ASSESSMENT — PAIN DESCRIPTION - DESCRIPTORS: DESCRIPTORS: BURNING;ACHING

## 2019-06-15 ASSESSMENT — PAIN DESCRIPTION - ONSET: ONSET: ON-GOING

## 2019-06-15 ASSESSMENT — PAIN - FUNCTIONAL ASSESSMENT: PAIN_FUNCTIONAL_ASSESSMENT: ACTIVITIES ARE NOT PREVENTED

## 2019-06-15 ASSESSMENT — PAIN DESCRIPTION - FREQUENCY: FREQUENCY: INTERMITTENT

## 2019-06-15 ASSESSMENT — PAIN DESCRIPTION - ORIENTATION: ORIENTATION: MID

## 2019-06-15 ASSESSMENT — PAIN DESCRIPTION - PAIN TYPE: TYPE: ACUTE PAIN

## 2019-06-15 ASSESSMENT — PAIN DESCRIPTION - LOCATION: LOCATION: THROAT

## 2019-06-15 ASSESSMENT — PAIN SCALES - GENERAL: PAINLEVEL_OUTOF10: 9

## 2019-06-15 NOTE — ED PROVIDER NOTES
[address] [facility]     Emergency Department     Faculty Attestation    I performed a history and physical examination of the patient and discussed management with the resident. I have reviewed and agree with the residents findings including all diagnostic interpretations, and treatment plans as written at the time of my review. Any areas of disagreement are noted on the chart. I was personally present for the key portions of any procedures. I have documented in the chart those procedures where I was not present during the key portions. For Physician Assistant/ Nurse Practitioner cases/documentation I have personally evaluated this patient and have completed at least one if not all key elements of the E/M (history, physical exam, and MDM). Additional findings are as noted. Primary Care Physician: Juan Luis Martel MD    History: This is a 53 y.o. female who presents to the Emergency Department with complaint of stridor. The patient presents emerged by complaining of stridor that has been ongoing past couple of days. He states he did get a recent pneumonia shot. She has been using inhalers without any improvement of her symptoms. The patient has been given racemic epinephrine, albuterol and Solu-Medrol by EMS prior to arrival.    Physical:   height is 5' 3\" (1.6 m) and weight is 201 lb (91.2 kg). Her axillary temperature is 98.1 °F (36.7 °C). Her blood pressure is 177/98 (abnormal) and her pulse is 80. Her respiration is 20 and oxygen saturation is 100%. Patient does have upper airway inspiratory stridor there is no pooling of oral secretions no tongue elevation,    Impression: Stridor    Plan: Cervical imaging    EKG Interpretation    Interpreted by emergency department physician    Rhythm: normal sinus   Rate: normal  Axis: normal  Conduction: normal  ST Segments: normal  T Waves: normal    Clinical Impression: Normal EKG.           (Please note that portions of this note were completed with a voice recognition program.  Efforts were made to edit the dictations but occasionally words are mis-transcribed.)    Ana M Romero.  Sally Estrada MD, [facility]  Attending Emergency Medicine Physician            Joe Sarmiento MD  06/15/19 0724

## 2019-06-15 NOTE — TELEPHONE ENCOUNTER
Received call for request for prednisone. Patient stating she is having difficulty breathing, has nebulizer at home but is not functioning properly. Difficulty breathing for 3 days. Admits to chills, but unsure of fevers. Patient unsure if using inhalers at home. Unable to state if she has been coughing, unsure if productive sputum. Advised patient to be evaluated by physician.       All Iyer MD      Department of Internal Medicine  [facility]         6/15/2019, 1:03 PM

## 2019-06-15 NOTE — ED NOTES
Pt reports to ED via EMS with complaints of dyspnea/stridor for the past 3 days. Pt states that she had a pneumonia vaccine 3 days before all symptoms started. She also had her lisinopril switched from 5 mg to 10 mg. She is in bed in distress. She denies any chest pain.       Mel Laird RN  06/15/19 6787

## 2019-06-15 NOTE — ED NOTES
Bed: 14  Expected date:   Expected time:   Means of arrival:   Comments:  ALEENA [address], RN  06/15/19 4214

## 2019-06-15 NOTE — ED PROVIDER NOTES
Tobacco comment: gum ordered   Substance and Sexual Activity    Alcohol use: Yes     Comment: drinks frequently    Drug use: Yes     Types: Marijuana    Sexual activity: Not on file   Lifestyle    Physical activity:     Days per week: Not on file     Minutes per session: Not on file    Stress: Not on file   Relationships    Social connections:     Talks on phone: Not on file     Gets together: Not on file     Attends Caodaism service: Not on file     Active member of club or organization: Not on file     Attends meetings of clubs or organizations: Not on file     Relationship status: Not on file    Intimate partner violence:     Fear of current or ex partner: Not on file     Emotionally abused: Not on file     Physically abused: Not on file     Forced sexual activity: Not on file   Other Topics Concern    Not on file   Social History Narrative    Not on file       No family history on file. Allergies:  Tramadol    Home Medications:  Prior to Admission medications    Medication Sig Start Date End Date Taking?  Authorizing Provider   Nebulizers (COMPRESSOR/NEBULIZER) MISC Use as directed 6/14/19   Everlean Cushing, MD   Nebulizers (COMPRESSOR/NEBULIZER) MISC Use as needed for wheezing every 6 hrs 6/12/19   Randall Yoder MD   albuterol sulfate HFA (PROVENTIL HFA) 108 (90 Base) MCG/ACT inhaler Inhale 1-2 puffs into the lungs every 4 hours as needed for Wheezing 6/5/19   Brayden Manley MD   amLODIPine (NORVASC) 10 MG tablet Take 1 tablet by mouth daily 6/5/19   Brayden Manley MD   budesonide-formoterol (SYMBICORT) 160-4.5 MCG/ACT AERO Inhale 2 puffs into the lungs 2 times daily 6/5/19   Brayden Manley MD   albuterol (PROVENTIL) (5 MG/ML) 0.5% nebulizer solution Take 1 mL by nebulization 4 times daily as needed for Wheezing 6/5/19   Brayden Manley MD   guaiFENesin (MUCINEX) 600 MG extended release tablet Take 1 tablet by mouth 2 times daily 5/21/19   Chelsea Briceno MD   fluticasone (FLONASE) 50 MCG/ACT nasal spray 2 sprays by Each Nare route daily 5/22/19   Melissa Trent MD   nicotine (NICODERM CQ) 21 MG/24HR Place 1 patch onto the skin daily 5/22/19   Melissa Trent MD       REVIEW OF SYSTEMS    (2-9 systems for level 4, 10 or more for level 5)      Review of Systems   Constitutional: Negative for activity change, appetite change and fever. HENT: Negative for dental problem, sinus pressure and sinus pain. Respiratory: Positive for shortness of breath and stridor. Negative for cough and chest tightness. Cardiovascular: Negative for chest pain. Gastrointestinal: Negative for abdominal pain, nausea and vomiting. Genitourinary: Negative for dyspareunia and dysuria. Musculoskeletal: Negative for back pain, neck pain and neck stiffness. Neurological: Negative for weakness, light-headedness and numbness. Psychiatric/Behavioral: Negative for agitation and confusion. PHYSICAL EXAM   (up to 7 for level 4, 8 or more for level 5)      INITIAL VITALS:   BP (!) 177/96   Pulse 70   Temp 98.1 °F (36.7 °C) (Axillary)   Resp 19   Ht 5' 3\" (1.6 m)   Wt 201 lb (91.2 kg)   SpO2 100%   BMI 35.61 kg/m²     Physical Exam   Constitutional: She is oriented to person, place, and time. She appears well-developed and well-nourished. No distress. HENT:   Head: Normocephalic and atraumatic. Eyes: Pupils are equal, round, and reactive to light. Neck: Normal range of motion. Cardiovascular: Normal rate and regular rhythm. No murmur heard. Pulmonary/Chest: Effort normal and breath sounds normal. Stridor present. She has no rales. She exhibits no tenderness. Abdominal: Soft. She exhibits no distension. There is no tenderness. There is no guarding. Musculoskeletal: Normal range of motion. She exhibits no edema. Neurological: She is alert and oriented to person, place, and time. No cranial nerve deficit. Coordination normal.   Skin: Skin is warm and dry.  Capillary refill takes less than 2 seconds. She is not diaphoretic. No erythema. Psychiatric: She has a normal mood and affect. Her behavior is normal.   Nursing note and vitals reviewed.       DIFFERENTIAL  DIAGNOSIS     PLAN (LABS / IMAGING / EKG):  Orders Placed This Encounter   Procedures    XR Neck Soft Tissue    CBC WITH AUTO DIFFERENTIAL    Basic Metabolic Panel    Hemoglobin and hematocrit, blood    SODIUM (POC)    POTASSIUM (POC)    CHLORIDE (POC)    CALCIUM, IONIC (POC)    Inpatient consult to Otolaryngology    Inpatient consult to Internal Medicine    POC Blood Gas and Chemistry    Venous Blood Gas, POC    Creatinine W/GFR Point of Care    Lactic Acid, POC    POCT Glucose    Anion Gap (Calc) POC    PATIENT STATUS (FROM ED OR OR/PROCEDURAL) Inpatient       MEDICATIONS ORDERED:  Orders Placed This Encounter   Medications    midazolam (VERSED) injection 0.5 mg    lidocaine PF 4 % injection 4 mL    clindamycin (CLEOCIN) 600 mg in dextrose 5 % 50 mL IVPB    dexamethasone (DECADRON) injection 10 mg       DDX: mass, swelling, vocal cord paralysis     DIAGNOSTIC RESULTS / EMERGENCY DEPARTMENT COURSE / MDM     LABS:  Results for orders placed or performed during the hospital encounter of 06/15/19   CBC WITH AUTO DIFFERENTIAL   Result Value Ref Range    WBC 7.2 3.5 - 11.3 k/uL    RBC 4.43 3.95 - 5.11 m/uL    Hemoglobin 14.4 11.9 - 15.1 g/dL    Hematocrit 43.5 36.3 - 47.1 %    MCV 98.2 82.6 - 102.9 fL    MCH 32.5 25.2 - 33.5 pg    MCHC 33.1 28.4 - 34.8 g/dL    RDW 13.5 11.8 - 14.4 %    Platelets 219 569 - 899 k/uL    MPV 9.2 8.1 - 13.5 fL    NRBC Automated 0.0 0.0 per 100 WBC    Differential Type NOT REPORTED     Seg Neutrophils 51 36 - 65 %    Lymphocytes 39 24 - 43 %    Monocytes 8 3 - 12 %    Eosinophils % 2 1 - 4 %    Basophils 0 0 - 2 %    Immature Granulocytes 0 0 %    Segs Absolute 3.65 1.50 - 8.10 k/uL    Absolute Lymph # 2.84 1.10 - 3.70 k/uL    Absolute Mono # 0.54 0.10 - 1.20 k/uL    Absolute Eos # 0.14 0.00 - 0.44 k/uL    Basophils # 0.03 0.00 - 0.20 k/uL    Absolute Immature Granulocyte <0.03 0.00 - 0.30 k/uL    WBC Morphology NOT REPORTED     RBC Morphology NOT REPORTED     Platelet Estimate NOT REPORTED    Basic Metabolic Panel   Result Value Ref Range    Glucose 100 (H) 70 - 99 mg/dL    BUN 10 6 - 20 mg/dL    CREATININE 0.63 0.50 - 0.90 mg/dL    Bun/Cre Ratio NOT REPORTED 9 - 20    Calcium 8.4 (L) 8.6 - 10.4 mg/dL    Sodium 133 (L) 135 - 144 mmol/L    Potassium 4.1 3.7 - 5.3 mmol/L    Chloride 96 (L) 98 - 107 mmol/L    CO2 25 20 - 31 mmol/L    Anion Gap 12 9 - 17 mmol/L    GFR Non-African American >60 >60 mL/min    GFR African American >60 >60 mL/min    GFR Comment          GFR Staging NOT REPORTED    Hemoglobin and hematocrit, blood   Result Value Ref Range    POC Hemoglobin 16.2 (H) 12.0 - 16.0 g/dL    POC Hematocrit 48 (H) 36 - 46 %   SODIUM (POC)   Result Value Ref Range    POC Sodium 133 (L) 138 - 146 mmol/L   POTASSIUM (POC)   Result Value Ref Range    POC Potassium 4.1 3.5 - 4.5 mmol/L   CHLORIDE (POC)   Result Value Ref Range    POC Chloride 97 (L) 98 - 107 mmol/L   CALCIUM, IONIC (POC)   Result Value Ref Range    POC Ionized Calcium 1.04 (L) 1.15 - 1.33 mmol/L   Venous Blood Gas, POC   Result Value Ref Range    pH, Tyrese 7.420 7.320 - 7.430    pCO2, Tyrese 43.9 41.0 - 51.0 mm Hg    pO2, Tyrese 52.8 (H) 30.0 - 50.0 mm Hg    HCO3, Venous 28.5 22.0 - 29.0 mmol/L    Total CO2, Venous 30 23.0 - 30.0 mmol/L    Negative Base Excess, Tyrese NOT REPORTED 0.0 - 2.0    Positive Base Excess, Tyrese 3 0.0 - 3.0    O2 Sat, Tyrese 87 (H) 60.0 - 85.0 %    O2 Device/Flow/% NOT REPORTED     Jan Test NOT REPORTED     Sample Site NOT REPORTED     Mode NOT REPORTED     FIO2 NOT REPORTED     Pt Temp NOT REPORTED     POC pH Temp NOT REPORTED     POC pCO2 Temp NOT REPORTED mm Hg    POC pO2 Temp NOT REPORTED mm Hg   Creatinine W/GFR Point of Care   Result Value Ref Range    POC Creatinine 0.67 0.51 - 1.19 mg/dL    GFR Comment >60 >60 mL/min    GFR Non-African American >60 >60 mL/min    GFR Comment         Lactic Acid, POC   Result Value Ref Range    POC Lactic Acid 0.73 0.56 - 1.39 mmol/L   POCT Glucose   Result Value Ref Range    POC Glucose 102 (H) 74 - 100 mg/dL   Anion Gap (Calc) POC   Result Value Ref Range    Anion Gap 8 7 - 16 mmol/L       IMPRESSION: 61-year-old female presents with stridor. Presents by EMS. Has inspiratory stridor for the last 3 days. Thinks it could have been related to recent pneumonia vaccination. On exam she is tachypneic and has inspiratory stridor. She saturating normally on oxygen. She received racemic epinephrine and Solu-Medrol via EMS. Plan is for soft tissue x-ray, a bedside nasolaryngoscope and discussion with ENT. RADIOLOGY:  Nasolaryngoscopy Procedure Note    Indication: stridor    Procedure: The patient was placed in the upright position. A flexible nasolaryngoscope was placed in patient's left nare and continued down to view of the vocal cords. Visualization of vocal cords was achieved. Closure of the vocal cords was visualized, on inspiration with possible polyps noted as well. The patient tolerated the procedure well. Complication: bleeding       EMERGENCY DEPARTMENT COURSE:    Video obtained via nasal laryngoscope E. Discussed this and sent the video to Dr. SAMMICHARMIANE [facility], ENT. States patient's problem is likely polyps on the vocal cords. Recommends admission due to her stridor, steroids Decadron 10 mg every 8 hours as well as antibiotics. We will treat for Unasyn however will cover her with clindamycin. EKG  EKG: normal EKG, normal sinus rhythm, nonspecific ST and T waves changes. All EKG's are interpreted by the Emergency Department Physician who either signs or Co-signs this chart in the absence of a cardiologist.      PROCEDURES:  None    CONSULTS:  IP CONSULT TO OTOLARYNGOLOGY  IP CONSULT TO INTERNAL MEDICINE    CRITICAL CARE:  None    FINAL IMPRESSION      1. Vocal cord polyps    2. Inspiratory stridor          DISPOSITION / PLAN     DISPOSITION Admitted 06/15/2019 07:16:50 PM      PATIENT REFERRED TO:  Maynor Vences MD  [address] [phone]            DISCHARGE MEDICATIONS:  New Prescriptions    No medications on file       Bear Sifuentes DO  Emergency Medicine Resident    (Please note that portions of thisnote were completed with a voice recognition program.  Efforts were made to edit the dictations but occasionally words are mis-transcribed. )        Bear Sifuentes DO  Resident  06/15/19 [id number]

## 2019-06-16 ENCOUNTER — ANESTHESIA EVENT (OUTPATIENT)
Dept: OPERATING ROOM | Age: 44
DRG: 115 | End: 2019-06-16
Payer: MEDICAID

## 2019-06-16 LAB
ABSOLUTE EOS #: 0 K/UL (ref 0–0.44)
ABSOLUTE IMMATURE GRANULOCYTE: 0.04 K/UL (ref 0–0.3)
ABSOLUTE LYMPH #: 0.48 K/UL (ref 1.1–3.7)
ABSOLUTE MONO #: 0.04 K/UL (ref 0.1–1.2)
ANION GAP SERPL CALCULATED.3IONS-SCNC: 10 MMOL/L (ref 9–17)
BASOPHILS # BLD: 0 % (ref 0–2)
BASOPHILS ABSOLUTE: 0 K/UL (ref 0–0.2)
BUN BLDV-MCNC: 11 MG/DL (ref 6–20)
BUN/CREAT BLD: ABNORMAL (ref 9–20)
CALCIUM SERPL-MCNC: 8.4 MG/DL (ref 8.6–10.4)
CHLORIDE BLD-SCNC: 97 MMOL/L (ref 98–107)
CO2: 23 MMOL/L (ref 20–31)
CREAT SERPL-MCNC: 0.52 MG/DL (ref 0.5–0.9)
DIFFERENTIAL TYPE: ABNORMAL
EOSINOPHILS RELATIVE PERCENT: 0 % (ref 1–4)
GFR AFRICAN AMERICAN: >60 ML/MIN
GFR NON-AFRICAN AMERICAN: >60 ML/MIN
GFR SERPL CREATININE-BSD FRML MDRD: ABNORMAL ML/MIN/{1.73_M2}
GFR SERPL CREATININE-BSD FRML MDRD: ABNORMAL ML/MIN/{1.73_M2}
GLUCOSE BLD-MCNC: 150 MG/DL (ref 65–105)
GLUCOSE BLD-MCNC: 161 MG/DL (ref 70–99)
GLUCOSE BLD-MCNC: 165 MG/DL (ref 65–105)
GLUCOSE BLD-MCNC: 178 MG/DL (ref 65–105)
GLUCOSE BLD-MCNC: 223 MG/DL (ref 65–105)
HCT VFR BLD CALC: 43.8 % (ref 36.3–47.1)
HEMOGLOBIN: 14.7 G/DL (ref 11.9–15.1)
IMMATURE GRANULOCYTES: 1 %
LYMPHOCYTES # BLD: 12 % (ref 24–43)
MCH RBC QN AUTO: 32.8 PG (ref 25.2–33.5)
MCHC RBC AUTO-ENTMCNC: 33.6 G/DL (ref 28.4–34.8)
MCV RBC AUTO: 97.8 FL (ref 82.6–102.9)
MONOCYTES # BLD: 1 % (ref 3–12)
MORPHOLOGY: NORMAL
NRBC AUTOMATED: 0 PER 100 WBC
PDW BLD-RTO: 13.2 % (ref 11.8–14.4)
PLATELET # BLD: 238 K/UL (ref 138–453)
PLATELET ESTIMATE: ABNORMAL
PMV BLD AUTO: 9.1 FL (ref 8.1–13.5)
POTASSIUM SERPL-SCNC: 4.4 MMOL/L (ref 3.7–5.3)
RBC # BLD: 4.48 M/UL (ref 3.95–5.11)
RBC # BLD: ABNORMAL 10*6/UL
SEG NEUTROPHILS: 86 % (ref 36–65)
SEGMENTED NEUTROPHILS ABSOLUTE COUNT: 3.44 K/UL (ref 1.5–8.1)
SODIUM BLD-SCNC: 130 MMOL/L (ref 135–144)
WBC # BLD: 4 K/UL (ref 3.5–11.3)
WBC # BLD: ABNORMAL 10*3/UL

## 2019-06-16 PROCEDURE — 6370000000 HC RX 637 (ALT 250 FOR IP): Performed by: STUDENT IN AN ORGANIZED HEALTH CARE EDUCATION/TRAINING PROGRAM

## 2019-06-16 PROCEDURE — 36415 COLL VENOUS BLD VENIPUNCTURE: CPT

## 2019-06-16 PROCEDURE — 1200000000 HC SEMI PRIVATE

## 2019-06-16 PROCEDURE — 94640 AIRWAY INHALATION TREATMENT: CPT

## 2019-06-16 PROCEDURE — 85025 COMPLETE CBC W/AUTO DIFF WBC: CPT

## 2019-06-16 PROCEDURE — 2700000000 HC OXYGEN THERAPY PER DAY

## 2019-06-16 PROCEDURE — 0CJS8ZZ INSPECTION OF LARYNX, VIA NATURAL OR ARTIFICIAL OPENING ENDOSCOPIC: ICD-10-PCS | Performed by: OTOLARYNGOLOGY

## 2019-06-16 PROCEDURE — 6360000002 HC RX W HCPCS: Performed by: STUDENT IN AN ORGANIZED HEALTH CARE EDUCATION/TRAINING PROGRAM

## 2019-06-16 PROCEDURE — 82947 ASSAY GLUCOSE BLOOD QUANT: CPT

## 2019-06-16 PROCEDURE — 97162 PT EVAL MOD COMPLEX 30 MIN: CPT

## 2019-06-16 PROCEDURE — 97530 THERAPEUTIC ACTIVITIES: CPT

## 2019-06-16 PROCEDURE — 93005 ELECTROCARDIOGRAM TRACING: CPT | Performed by: EMERGENCY MEDICINE

## 2019-06-16 PROCEDURE — 99232 SBSQ HOSP IP/OBS MODERATE 35: CPT | Performed by: INTERNAL MEDICINE

## 2019-06-16 PROCEDURE — 80048 BASIC METABOLIC PNL TOTAL CA: CPT

## 2019-06-16 PROCEDURE — 2500000003 HC RX 250 WO HCPCS: Performed by: STUDENT IN AN ORGANIZED HEALTH CARE EDUCATION/TRAINING PROGRAM

## 2019-06-16 PROCEDURE — 2580000003 HC RX 258: Performed by: STUDENT IN AN ORGANIZED HEALTH CARE EDUCATION/TRAINING PROGRAM

## 2019-06-16 RX ORDER — DIPHENHYDRAMINE HCL 25 MG
25 TABLET ORAL ONCE
Status: COMPLETED | OUTPATIENT
Start: 2019-06-16 | End: 2019-06-16

## 2019-06-16 RX ORDER — LOSARTAN POTASSIUM 25 MG/1
25 TABLET ORAL DAILY
Status: DISCONTINUED | OUTPATIENT
Start: 2019-06-16 | End: 2019-06-18 | Stop reason: HOSPADM

## 2019-06-16 RX ADMIN — DEXAMETHASONE SODIUM PHOSPHATE 10 MG: 10 INJECTION INTRAMUSCULAR; INTRAVENOUS at 05:43

## 2019-06-16 RX ADMIN — THERA TABS 1 TABLET: TAB at 08:59

## 2019-06-16 RX ADMIN — DIPHENHYDRAMINE HCL 25 MG: 25 TABLET ORAL at 02:57

## 2019-06-16 RX ADMIN — CALCIUM GLUCONATE 2 G: 98 INJECTION, SOLUTION INTRAVENOUS at 09:53

## 2019-06-16 RX ADMIN — LOSARTAN POTASSIUM 25 MG: 25 TABLET, FILM COATED ORAL at 04:28

## 2019-06-16 RX ADMIN — Medication 10 ML: at 21:01

## 2019-06-16 RX ADMIN — FOLIC ACID 1 MG: 1 TABLET ORAL at 08:59

## 2019-06-16 RX ADMIN — MOMETASONE FUROATE AND FORMOTEROL FUMARATE DIHYDRATE 2 PUFF: 200; 5 AEROSOL RESPIRATORY (INHALATION) at 20:13

## 2019-06-16 RX ADMIN — CLINDAMYCIN PHOSPHATE 600 MG: 600 INJECTION, SOLUTION INTRAVENOUS at 19:47

## 2019-06-16 RX ADMIN — ACETAMINOPHEN 650 MG: 325 TABLET ORAL at 02:57

## 2019-06-16 RX ADMIN — FAMOTIDINE 20 MG: 20 TABLET, FILM COATED ORAL at 21:05

## 2019-06-16 RX ADMIN — CLINDAMYCIN PHOSPHATE 600 MG: 600 INJECTION, SOLUTION INTRAVENOUS at 03:49

## 2019-06-16 RX ADMIN — AMLODIPINE BESYLATE 10 MG: 10 TABLET ORAL at 08:59

## 2019-06-16 RX ADMIN — Medication 100 MG: at 08:59

## 2019-06-16 RX ADMIN — CLINDAMYCIN PHOSPHATE 600 MG: 600 INJECTION, SOLUTION INTRAVENOUS at 11:34

## 2019-06-16 RX ADMIN — DEXAMETHASONE SODIUM PHOSPHATE 10 MG: 10 INJECTION INTRAMUSCULAR; INTRAVENOUS at 21:00

## 2019-06-16 RX ADMIN — DEXAMETHASONE SODIUM PHOSPHATE 10 MG: 10 INJECTION INTRAMUSCULAR; INTRAVENOUS at 14:05

## 2019-06-16 RX ADMIN — FAMOTIDINE 20 MG: 20 TABLET, FILM COATED ORAL at 08:59

## 2019-06-16 ASSESSMENT — PAIN SCALES - GENERAL
PAINLEVEL_OUTOF10: 4
PAINLEVEL_OUTOF10: 4
PAINLEVEL_OUTOF10: 9

## 2019-06-16 ASSESSMENT — PAIN DESCRIPTION - DESCRIPTORS: DESCRIPTORS: ACHING

## 2019-06-16 ASSESSMENT — PAIN DESCRIPTION - ONSET: ONSET: GRADUAL

## 2019-06-16 ASSESSMENT — PAIN DESCRIPTION - LOCATION: LOCATION: ARM;LEG

## 2019-06-16 ASSESSMENT — PAIN DESCRIPTION - PROGRESSION: CLINICAL_PROGRESSION: NOT CHANGED

## 2019-06-16 ASSESSMENT — PAIN DESCRIPTION - ORIENTATION: ORIENTATION: LEFT;RIGHT

## 2019-06-16 ASSESSMENT — PAIN DESCRIPTION - FREQUENCY: FREQUENCY: INTERMITTENT

## 2019-06-16 ASSESSMENT — PAIN - FUNCTIONAL ASSESSMENT: PAIN_FUNCTIONAL_ASSESSMENT: ACTIVITIES ARE NOT PREVENTED

## 2019-06-16 ASSESSMENT — PAIN DESCRIPTION - PAIN TYPE: TYPE: ACUTE PAIN

## 2019-06-16 NOTE — H&P
[address]     Department of Internal Medicine - Staff Internal Medicine Service          ADMISSION NOTE/HISTORY AND PHYSICAL EXAMINATION   ______________________________________________________________________    HISTORY OBTAIN FROM:  patient    CHIEF COMPLAINT: Shortness of breath and stridor      HISTORY OF PRESENT ILLNESS:      The patient is a pleasant 53 y.o. female with significant past medical history of COPD, active smoker, essential hypertension, alcohol abuse to the ED by EMS due to complaints of dyspnea or stridor from past 3 days. Patient was found to be dyspneic at home, tachypneic on examination with inspiratory stridor, patient was given racemic epinephrine infusion and Solu-Medrol for possible  drug reaction. Patient denies any wheezing, cough, fever, chest pain, fever. ENT was consulted from the ED for evaluation of stridor, a flexible nasal laryngoscope was done in the ED which showed possible polyps noted on the vocal cord. She denies any dysphagia states she had breakfast in the morning. Patient states she drinks around 5-6 beers in a day, her last drink was yesterday. Patient states she goes into withdrawal if she does not drink alcohol. Is afebrile, heart rate in 80s, blood pressure with systolic in 937L and diastolic in 566Y, saturating at 100% on  nasal cannula, 133, urine drug screen positive for benzodiazepines and cannabis. ENT was consulted from the ED, advised for Decadron 10 mg IV every 8 hours and clindamycin.     PAST MEDICAL HISTORY:        Diagnosis Date    Asthma     Chronic kidney disease     COPD [facility]chronic obstructive pulmonary disease) ([facility] [address]     Hypertension     Liver disease        PAST SURGICAL HISTORY:        Procedure Laterality Date    FL EXCISION OF BONE, LOWER JAW N/A 3/17/2017    COMPLEX LACERATION REPAIR WITH DEBRIDEMENT performed by Bryan Zayas DDS at [address]:  Medications lesions, oral pharynx with moist mucus membranes, tonsils without erythema or exudates, gums normal and good dentition. NECK:  Supple, symmetrical, trachea midline, no adenopathy, thyroid symmetric, not enlarged and no tenderness, skin normal, stridor on examination  LUNGS: stridor  on examination  CARDIOVASCULAR:  Normal apical impulse, regular rate and rhythm, normal S1 and S2, no S3 or S4, and no murmur noted  ABDOMEN:  No scars, normal bowel sounds, soft, non-distended, non-tender, no masses palpated, no hepatosplenomegally  MUSCULOSKELETAL:  There is no redness, warmth, or swelling of the joints. Full range of motion noted. Motor strength is 5 out of 5 all extremities bilaterally. Tone is normal.  NEUROLOGIC:  Awake, alert, oriented to name, place and time. Cranial nerves II-XII are grossly intact. Motor is 5 out of 5 bilaterally. Cerebellar finger to nose, heel to shin intact. Sensory is intact. Babinski down going, Romberg negative, and gait is normal.  SKIN:  no bruising or bleeding        IMPRESSION    This is a 53 y.o. female who presented with  stridor and found to have vocal  cord polyps. Patient requires admit to inpatient status  for ENT evaluation    ASSESSMENT/PLAN:  1. Inspiratory stridor due to vocal cord polyps : IV hydration per hour. IV dexamethasone 10 mg q8h, Clindamycin  mg q8h. ENT on board. N.p.o. for now. Will do speech evaluation. 2. Essential hypertension : Norvasc 10 mg daily, losartan 25 mg daily. 3. Alcohol use disorder : Folate, thiamine, multivitamin supplementation. watch for alcohol withdrawal  4. Nicotine dependence : nicotine patch  5. lovenox for DVT prophylaxis      Hailey Jackson, PGY-1,   Internal Medicine Resident,  [facility]. ATTESTATION:    I have discussed the case, including pertinent history and exam findings with the residents.  I have seen and examined the patient and the key elements of the

## 2019-06-16 NOTE — CONSULTS
[address]                                  CONSULTATION    PATIENT NAME: Enoc Rodríguez                     :        1975  MED REC NO:   [id number]                             ROOM:       2122  ACCOUNT NO:   [de-identified]                           ADMIT DATE: 06/15/2019  PROVIDER:     Yumiko Powell    CONSULT DATE:  2019    LOCATION:  329, bed-2. HISTORY OF PRESENT ILLNESS:  This is a 57-year-old who presented to the  [address] [place] [address] Emergency Room with a 3-day history of dyspnea. The reviewers, refer to the chart for definitive details. Medical history is significant for COPD, tobacco and alcohol abuse. The  patient admits to a 3-day history of worsening dyspnea and stridor. In  the emergency room setting, racemic epinephrine was provided as well as  Solu-Medrol. Flexible nasopharyngoscopy was done by the emergency room  medical staff. This suggested bilateral vocal cord polyposis. The  patient was admitted for airway observation. Empiric Decadron and  Cleocin were initiated. Overnight, the patient admits to improvement in her subjective  breathing. She denied significant change in hoarseness complaint. She  describes mild throat pain only. She denies dysphagia and was able to  tolerate breakfast this morning. PAST MEDICAL HISTORY:  As above. Chronic kidney disease, hypertension,  unspecified liver disease. PAST SURGICAL HISTORY:  Complex facial laceration repair with  debridement including lower jaw bone excision, performed by Oral  Surgery, . ALLERGIES:  TRAMADOL. SOCIAL HISTORY:  As above. Illicit drug use history. VITAL SIGNS:  Afebrile. Heart rate 76, respiratory rate 16, blood  pressure 141/79, O2 saturation 98% on nasal cannula, weight 204 pounds.     BLOOD WORK, 2019, white blood cell count, hemoglobin, and  platelets were within normal

## 2019-06-16 NOTE — PROGRESS NOTES
Physical Therapy    Facility/Department: [facility]  Initial Assessment    NAME: Silvia Avila Call  : 1975  MRN: [id number]    Date of Service: 2019  Chief Complaint   Patient presents with    Medication Reaction     Dyspnea for the past 3 days. Possible medication reaction     The patient is a pleasant 53 y.o. female with significant past medical history of COPD, active smoker, essential hypertension, alcohol abuse to the ED by EMS due to complaints of dyspnea or stridor from past 3 days. Patient was found to be dyspneic at home, tachypneic on examination with inspiratory stridor, patient was given racemic epinephrine infusion and Solu-Medrol for possible  drug reaction. Patient denies any wheezing, cough, fever, chest pain, fever. ENT was consulted from the ED for evaluation of stridor, a flexible nasal laryngoscope was done in the ED which showed possible polyps noted on the vocal cord.     She denies any dysphagia states she had breakfast in the morning.     Patient states she drinks around 5-6 beers in a day, her last drink was yesterday. Patient states she goes into withdrawal if she does not drink alcohol.     Is afebrile, heart rate in 80s, blood pressure with systolic in 298V and diastolic in 445G, saturating at 100% on  nasal cannula, 133, urine drug screen positive for benzodiazepines and cannabis.     ENT was consulted from the ED, advised for Decadron 10 mg IV every 8 hours and clindamycin. Discharge Recommendations:  Patient would benefit from continued therapy after discharge   PT Equipment Recommendations  Equipment Needed: (TBD)    Assessment   Assessment: Pt amb 20ft no AD CGA, pt slightly unsteady without any LOB. Pt would benefit from continued skilled physical therapy to help progress towards PLOF and increase independence with functional mobility skills.    Prognosis: Good  Decision Making: Medium Complexity  Patient Education: PT POC; PT eval  REQUIRES PT FOLLOW UP: Yes  Activity Tolerance  Activity Tolerance: Patient Tolerated treatment well;Patient limited by endurance  Activity Tolerance: Pt limited by O2        Patient Diagnosis(es): The primary encounter diagnosis was Vocal cord polyps. A diagnosis of Inspiratory stridor was also pertinent to this visit. has a past medical history of Asthma, Chronic kidney disease, COPD (chronic obstructive pulmonary disease) ([facility] [address] Hypertension, and Liver disease. has a past surgical history that includes pr excision of bone, lower jaw (N/A, 3/17/2017). Restrictions  Restrictions/Precautions  Restrictions/Precautions: Up as Tolerated  Required Braces or Orthoses?: No  Vision/Hearing  Vision: Impaired  Vision Exceptions: (Per pt she is legally blind. )  Hearing: Within functional limits     Subjective  General  Patient assessed for rehabilitation services?: Yes  Response To Previous Treatment: Not applicable  Family / Caregiver Present: No  Follows Commands: Within Functional Limits  Subjective  Subjective: Pt lying supine upon PT arrival. Pt and RN agreeable to PT this morning. Pt very emotional as she reports she was just told she will be having surgery and they told her there is a \"chance she may die, and she doesn't want to die yet. \"  Pain Screening  Patient Currently in Pain: Yes  Pain Assessment  Pain Assessment: 0-10  Pain Level: 4  Patient's Stated Pain Goal: No pain  Pain Type: Acute pain  Pain Location: Arm;Leg  Pain Orientation: Left;Right  Pain Descriptors: Aching  Pain Frequency: Intermittent  Pain Onset: Gradual  Clinical Progression: Not changed  Functional Pain Assessment: Activities are not prevented  Non-Pharmaceutical Pain Intervention(s): Repositioned; Rest  Vital Signs  Patient Currently in Pain: Yes       Orientation  Orientation  Overall Orientation Status: Within Normal Limits  Social/Functional History  Social/Functional History  Lives With: Son  Type of Home: House  Home Layout: One level  Home Access: Stairs to enter with rails  Entrance Stairs - Number of Steps: 3  Entrance Stairs - Rails: Both  Bathroom Shower/Tub: Tub/Shower unit, Shower chair with back  ADL Assistance: [address]: Independent  Homemaking Responsibilities: Yes  Ambulation Assistance: Independent  Transfer Assistance: Independent  Active : No  Patient's  Info: per pt, legally blind, unable to qualify for drivers license  Cognition   Cognition  Overall Cognitive Status: WNL    Objective     Observation/Palpation  Posture: Good    Joint Mobility  Spine: WFL  ROM RLE: WFL  ROM LLE: WFL  ROM RUE: WFL  ROM LUE: WFL  Strength RLE  Strength RLE: WFL  Comment: Grossly 4-/5  Strength LLE  Strength LLE: WFL  Comment: Grossly 4-/5  Strength RUE  Strength RUE: WFL  Comment: Grossly 4-/5  Strength LUE  Strength LUE: WFL  Comment: Grossly 4-/5  Tone RLE  RLE Tone: Normotonic  Tone LLE  LLE Tone: Normotonic  Motor Control  Gross Motor?: WNL  Coordination  Rapid Alternating Movements: Normal  Sensation  Overall Sensation Status: WNL  Bed mobility  Bridging: Independent  Rolling to Left: Independent  Rolling to Right: Independent  Supine to Sit: Independent  Sit to Supine: Independent  Scooting: Independent  Transfers  Sit to Stand: Contact guard assistance  Stand to sit: Contact guard assistance  Bed to Chair: Contact guard assistance  Ambulation  Ambulation?: Yes  Ambulation 1  Surface: level tile  Device: No Device  Assistance: Contact guard assistance  Quality of Gait: Pt displays decreased marquez. Distance: 20ft  Stairs/Curb  Stairs?: No     Balance  Posture: Good  Sitting - Static: Good  Sitting - Dynamic: Good;-  Standing - Static: Fair  Standing - Dynamic: Fair;-  Comments: Standing balance assessed without AD.          Plan   Plan  Times per week: 5x  Current Treatment Recommendations: Strengthening, Transfer Training, Endurance Training, Patient/Caregiver Education & Training, ADL/Self-care Training, Equipment

## 2019-06-16 NOTE — PROGRESS NOTES
[facility]  Internal Medicine Residency Program  Inpatient Daily Progress Note  ______________________________________________________________________________    Patient: Caro Kim Call  YOB: 1975   MRN: [id number]    Acct: [de-identified]     Admit date: 6/15/2019  Today's date: 06/16/19  Number of days in the hospital: 1  Expected Discharge Date: 06/18/19    Admitting Diagnosis: Inspiratory stridor    Subjective:   Pt seen and Chart reviewed. Stridor is improving  Not going any withdrawals.   Drink was 48 hours ago  ENT  will see the patient today    Objective:   Vital Sign:  BP (!) 141/79   Pulse 76   Temp 97.9 °F (36.6 °C) (Oral)   Resp 16   Ht 5' 3\" (1.6 m)   Wt 204 lb 12.9 oz (92.9 kg)   SpO2 98%   BMI 36.28 kg/m²       Physical Exam:  General appearance:   alert, well appearing, and in no distress  Mental Status: alert, oriented to person, place, and time  Neurologic:  alert, oriented, normal speech, no focal findings or movement disorder noted  Lungs: stridor  on examination  Heart[de-identified] normal rate, regular rhythm, normal S1, S2, no murmurs, rubs, clicks or gallops  Abdomen:  soft, nontender, nondistended, no masses or organomegaly  Extremities: peripheral pulses normal, no pedal edema, no clubbing or cyanosis   Skin: normal coloration and turgor, no rashes, no suspicious skin lesions noted    Medications:  Scheduled Medications   losartan  25 mg Oral Daily    [START ON 6/17/2019] enoxaparin  40 mg Subcutaneous Daily    nicotine  1 patch Transdermal Daily    sodium chloride flush  10 mL Intravenous 2 times per day    famotidine  20 mg Oral BID    dexamethasone  10 mg Intravenous Q8H    clindamycin (CLEOCIN) IV  600 mg Intravenous Q8H    thiamine  100 mg Oral Daily    folic acid  1 mg Oral Daily    multivitamin  1 tablet Oral Daily    mometasone-formoterol  2 puff Inhalation BID    amLODIPine  10 mg Oral Daily       PRN Medications  albuterol sulfate HFA 2 puff Q4H PRN   sodium chloride flush 10 mL PRN   magnesium hydroxide 30 mL Daily PRN   ondansetron 4 mg Q6H PRN   potassium chloride 40 mEq PRN   Or     potassium alternative oral replacement 40 mEq PRN   Or     potassium chloride 10 mEq PRN   potassium chloride 10 mEq PRN   magnesium sulfate 1 g PRN   acetaminophen 650 mg Q4H PRN   labetalol 10 mg Q4H PRN       Diagnostic Labs and Imaging:  CBC:  Recent Labs     06/15/19  1827 06/16/19  0522   WBC 7.2 4.0   HGB 14.4 14.7    238     BMP: Recent Labs     06/15/19  1818 06/15/19  1827 06/16/19  0522   NA  --  133* 130*   K  --  4.1 4.4   CL  --  96* 97*   CO2  --  25 23   BUN  --  10 11   CREATININE 0.67 0.63 0.52   GLUCOSE  --  100* 161*     Hepatic: Recent Labs     06/15/19  1827   AST 27   ALT 27   BILITOT 0.36   ALKPHOS 74       Assessment and Plan:   1.  vocal cord polyps : IV hydration per hour. IV dexamethasone 10 mg q8h, Clindamycin  mg q8h. ENT on board. N.p.o. for now. Will do speech evaluation. 2. Essential hypertension : Norvasc 10 mg daily, losartan 25 mg daily. 3. Alcohol use disorder : Folate, thiamine, multivitamin supplementation. Beer with meals to prevent alcohol withdrawals  4. Nicotine dependence : nicotine patch  5. lovenox for DVT prophylaxis      Yvette Jimenez MD,  Internal Medicine Resident, PGY-1,   [address].  6/16/2019,12:14 PM    ATTESTATION:    I have discussed the case, including pertinent history and exam findings with the residents. I have seen and examined the patient and the key elements of the encounter have been performed by me. I have reviewed the laboratory data, other diagnostic studies and discussed them with the residents. I have updated the medical record where necessary. · Stridor. On steroids  · Vocal cord polyps  · ETOH and nicotine abuse.  Will give beer with meals to prevent withdrawal  · HTN  · ENT evaluation appreciated: Bilateral vocal cord edema with mild laryngeal obstruction Surgical intervention tomorrow. I agree with the assessment, plan and orders as documented by the resident.     Aneesh Hamm MD.

## 2019-06-16 NOTE — PROGRESS NOTES
This is a 53 y.o. female admitted 6/15/2019 for Inspiratory stridor [R06.1]. See H&P of admitting/intern resident for more details. Patient with history of smoking, smokes atleast pack a day for last 34 year, alcohol abuse and drinks 6-7 bear day, smokes marijuana presented with progressively worsening shortness of breath and Inspiratory stridor for last 3 days. no fever, no lip or tongue swelling. She also complains of neck and throat pain. Naso laryngeal scope in ED showed larynx poly. ENT recommended Decadron 10 mg Tid and Clindamycin. On evaluation she is on 2 liter oxygen, has stridor, able to maintain airway, no drooling. BMP:   Recent Labs     06/15/19  1818 06/15/19  1827   NA  --  133*   K  --  4.1   CL  --  96*   CO2  --  25   BUN  --  10   CREATININE 0.67 0.63   GLUCOSE  --  100*     CBC: )  Recent Labs     06/15/19  1827   WBC 7.2   HGB 14.4   HCT 43.5             Assessment    Principal Problem:    Inspiratory stridor  Active Problems:    Acute alcohol intoxication (HCC)    Essential hypertension    Cigarette nicotine dependence     Laryngeal polyp  Resolved Problems:    * No resolved hospital problems. *        Plan     Continue Decadron and Clindamycin  Monitor airway  ENT evaluation in morning  EPC cuffs for DVT  NPO after midnight.     Alie Swanson MD            Department of Internal Medicine  [facility]         6/15/2019, 8:22 PM

## 2019-06-16 NOTE — PLAN OF CARE
Problem: Pain:  Goal: Pain level will decrease  Description  Pain level will decrease  Outcome: Ongoing  Goal: Control of acute pain  Description  Control of acute pain  Outcome: Ongoing  Pt pain was able to be controlled this shift. Pt educated on use of nonpharmacologic pain interventions.    Goal: Control of chronic pain  Description  Control of chronic pain  Outcome: Ongoing

## 2019-06-16 NOTE — PROGRESS NOTES
Patient seen, examined, including bedside flexible upper airway endoscopy  Chart reviewed  Consult dictated    A/P: Bilateral true vocal cord Emmanuel's edema secondary to COPD, tobacco abuse             - With associated mild laryngeal obstruction             - Recommend direct/microlaryngoscopy with bilateral vocal cord surgery in the operating room tomorrow                    procedure is tentatively scheduled for 7:30am  6/17/19             - Npo after midnight             - Hold Lovenox after today's dose, if cleared by the prescribing Medical Service             - Risks, benefits discussed at length with Mrs. Coppola, all questions answered, consent obtained with the Department of Nursing present as witness             - Continue empiric Decadron, Cleocin             - Convert nasal cannula oxygen therapy to a highly humidified facemask to aid laryngeal mucosal hygiene             - discussed with Nursing, please call with any questions

## 2019-06-17 ENCOUNTER — ANESTHESIA (OUTPATIENT)
Dept: OPERATING ROOM | Age: 44
DRG: 115 | End: 2019-06-17
Payer: MEDICAID

## 2019-06-17 VITALS — TEMPERATURE: 96.4 F | DIASTOLIC BLOOD PRESSURE: 117 MMHG | SYSTOLIC BLOOD PRESSURE: 175 MMHG | OXYGEN SATURATION: 100 %

## 2019-06-17 LAB
ABSOLUTE EOS #: 0 K/UL (ref 0–0.4)
ABSOLUTE IMMATURE GRANULOCYTE: 0 K/UL (ref 0–0.3)
ABSOLUTE LYMPH #: 0.97 K/UL (ref 1–4.8)
ABSOLUTE MONO #: 0.41 K/UL (ref 0.1–0.8)
ANION GAP SERPL CALCULATED.3IONS-SCNC: 9 MMOL/L (ref 9–17)
BASOPHILS # BLD: 0 % (ref 0–2)
BASOPHILS ABSOLUTE: 0 K/UL (ref 0–0.2)
BUN BLDV-MCNC: 16 MG/DL (ref 6–20)
BUN/CREAT BLD: ABNORMAL (ref 9–20)
CALCIUM SERPL-MCNC: 8.9 MG/DL (ref 8.6–10.4)
CHLORIDE BLD-SCNC: 102 MMOL/L (ref 98–107)
CO2: 23 MMOL/L (ref 20–31)
CREAT SERPL-MCNC: 0.64 MG/DL (ref 0.5–0.9)
DIFFERENTIAL TYPE: ABNORMAL
EKG ATRIAL RATE: 66 BPM
EKG ATRIAL RATE: 69 BPM
EKG P AXIS: 34 DEGREES
EKG P AXIS: 76 DEGREES
EKG P-R INTERVAL: 144 MS
EKG P-R INTERVAL: 148 MS
EKG Q-T INTERVAL: 394 MS
EKG Q-T INTERVAL: 428 MS
EKG QRS DURATION: 74 MS
EKG QRS DURATION: 82 MS
EKG QTC CALCULATION (BAZETT): 422 MS
EKG QTC CALCULATION (BAZETT): 448 MS
EKG R AXIS: 20 DEGREES
EKG R AXIS: 65 DEGREES
EKG T AXIS: 31 DEGREES
EKG T AXIS: 52 DEGREES
EKG VENTRICULAR RATE: 66 BPM
EKG VENTRICULAR RATE: 69 BPM
EOSINOPHILS RELATIVE PERCENT: 0 % (ref 1–4)
GFR AFRICAN AMERICAN: >60 ML/MIN
GFR NON-AFRICAN AMERICAN: >60 ML/MIN
GFR SERPL CREATININE-BSD FRML MDRD: ABNORMAL ML/MIN/{1.73_M2}
GFR SERPL CREATININE-BSD FRML MDRD: ABNORMAL ML/MIN/{1.73_M2}
GLUCOSE BLD-MCNC: 140 MG/DL (ref 70–99)
HCT VFR BLD CALC: 45.6 % (ref 36.3–47.1)
HEMOGLOBIN: 14.7 G/DL (ref 11.9–15.1)
IMMATURE GRANULOCYTES: 0 %
LYMPHOCYTES # BLD: 7 % (ref 24–44)
MCH RBC QN AUTO: 32.5 PG (ref 25.2–33.5)
MCHC RBC AUTO-ENTMCNC: 32.2 G/DL (ref 28.4–34.8)
MCV RBC AUTO: 100.7 FL (ref 82.6–102.9)
MONOCYTES # BLD: 3 % (ref 1–7)
MORPHOLOGY: NORMAL
NRBC AUTOMATED: 0 PER 100 WBC
PDW BLD-RTO: 13.5 % (ref 11.8–14.4)
PLATELET # BLD: 243 K/UL (ref 138–453)
PLATELET ESTIMATE: ABNORMAL
PMV BLD AUTO: 9.2 FL (ref 8.1–13.5)
POTASSIUM SERPL-SCNC: 4.1 MMOL/L (ref 3.7–5.3)
RBC # BLD: 4.53 M/UL (ref 3.95–5.11)
RBC # BLD: ABNORMAL 10*6/UL
SEG NEUTROPHILS: 90 % (ref 36–66)
SEGMENTED NEUTROPHILS ABSOLUTE COUNT: 12.42 K/UL (ref 1.8–7.7)
SODIUM BLD-SCNC: 134 MMOL/L (ref 135–144)
WBC # BLD: 13.8 K/UL (ref 3.5–11.3)
WBC # BLD: ABNORMAL 10*3/UL

## 2019-06-17 PROCEDURE — 3600000012 HC SURGERY LEVEL 2 ADDTL 15MIN: Performed by: OTOLARYNGOLOGY

## 2019-06-17 PROCEDURE — 2500000003 HC RX 250 WO HCPCS: Performed by: STUDENT IN AN ORGANIZED HEALTH CARE EDUCATION/TRAINING PROGRAM

## 2019-06-17 PROCEDURE — 88305 TISSUE EXAM BY PATHOLOGIST: CPT

## 2019-06-17 PROCEDURE — 2500000003 HC RX 250 WO HCPCS: Performed by: NURSE ANESTHETIST, CERTIFIED REGISTERED

## 2019-06-17 PROCEDURE — 6360000002 HC RX W HCPCS

## 2019-06-17 PROCEDURE — 2709999900 HC NON-CHARGEABLE SUPPLY: Performed by: OTOLARYNGOLOGY

## 2019-06-17 PROCEDURE — 6360000002 HC RX W HCPCS: Performed by: OTOLARYNGOLOGY

## 2019-06-17 PROCEDURE — 2580000003 HC RX 258: Performed by: OTOLARYNGOLOGY

## 2019-06-17 PROCEDURE — 6360000002 HC RX W HCPCS: Performed by: STUDENT IN AN ORGANIZED HEALTH CARE EDUCATION/TRAINING PROGRAM

## 2019-06-17 PROCEDURE — 3600000002 HC SURGERY LEVEL 2 BASE: Performed by: OTOLARYNGOLOGY

## 2019-06-17 PROCEDURE — 2580000003 HC RX 258: Performed by: NURSE ANESTHETIST, CERTIFIED REGISTERED

## 2019-06-17 PROCEDURE — 0CBV8ZZ EXCISION OF LEFT VOCAL CORD, VIA NATURAL OR ARTIFICIAL OPENING ENDOSCOPIC: ICD-10-PCS | Performed by: OTOLARYNGOLOGY

## 2019-06-17 PROCEDURE — 6370000000 HC RX 637 (ALT 250 FOR IP): Performed by: NURSE ANESTHETIST, CERTIFIED REGISTERED

## 2019-06-17 PROCEDURE — 0CBT8ZZ EXCISION OF RIGHT VOCAL CORD, VIA NATURAL OR ARTIFICIAL OPENING ENDOSCOPIC: ICD-10-PCS | Performed by: OTOLARYNGOLOGY

## 2019-06-17 PROCEDURE — 99232 SBSQ HOSP IP/OBS MODERATE 35: CPT | Performed by: INTERNAL MEDICINE

## 2019-06-17 PROCEDURE — 94640 AIRWAY INHALATION TREATMENT: CPT

## 2019-06-17 PROCEDURE — 93010 ELECTROCARDIOGRAM REPORT: CPT | Performed by: INTERNAL MEDICINE

## 2019-06-17 PROCEDURE — 7100000001 HC PACU RECOVERY - ADDTL 15 MIN: Performed by: OTOLARYNGOLOGY

## 2019-06-17 PROCEDURE — 6370000000 HC RX 637 (ALT 250 FOR IP): Performed by: OTOLARYNGOLOGY

## 2019-06-17 PROCEDURE — 6360000002 HC RX W HCPCS: Performed by: NURSE ANESTHETIST, CERTIFIED REGISTERED

## 2019-06-17 PROCEDURE — 3700000000 HC ANESTHESIA ATTENDED CARE: Performed by: OTOLARYNGOLOGY

## 2019-06-17 PROCEDURE — 36415 COLL VENOUS BLD VENIPUNCTURE: CPT

## 2019-06-17 PROCEDURE — 85025 COMPLETE CBC W/AUTO DIFF WBC: CPT

## 2019-06-17 PROCEDURE — 1200000000 HC SEMI PRIVATE

## 2019-06-17 PROCEDURE — 2500000003 HC RX 250 WO HCPCS: Performed by: OTOLARYNGOLOGY

## 2019-06-17 PROCEDURE — 3700000001 HC ADD 15 MINUTES (ANESTHESIA): Performed by: OTOLARYNGOLOGY

## 2019-06-17 PROCEDURE — 80048 BASIC METABOLIC PNL TOTAL CA: CPT

## 2019-06-17 PROCEDURE — 7100000000 HC PACU RECOVERY - FIRST 15 MIN: Performed by: OTOLARYNGOLOGY

## 2019-06-17 RX ORDER — HYDROCODONE BITARTRATE AND ACETAMINOPHEN 5; 325 MG/1; MG/1
1 TABLET ORAL EVERY 6 HOURS PRN
Status: DISCONTINUED | OUTPATIENT
Start: 2019-06-17 | End: 2019-06-18 | Stop reason: HOSPADM

## 2019-06-17 RX ORDER — PREDNISONE 20 MG/1
40 TABLET ORAL DAILY
Qty: 14 TABLET | Refills: 0 | OUTPATIENT
Start: 2019-06-17 | End: 2019-06-24

## 2019-06-17 RX ORDER — ONDANSETRON 2 MG/ML
INJECTION INTRAMUSCULAR; INTRAVENOUS PRN
Status: DISCONTINUED | OUTPATIENT
Start: 2019-06-17 | End: 2019-06-17 | Stop reason: SDUPTHER

## 2019-06-17 RX ORDER — NEOSTIGMINE METHYLSULFATE 5 MG/5 ML
SYRINGE (ML) INTRAVENOUS PRN
Status: DISCONTINUED | OUTPATIENT
Start: 2019-06-17 | End: 2019-06-17 | Stop reason: SDUPTHER

## 2019-06-17 RX ORDER — LIDOCAINE HYDROCHLORIDE 10 MG/ML
INJECTION, SOLUTION EPIDURAL; INFILTRATION; INTRACAUDAL; PERINEURAL PRN
Status: DISCONTINUED | OUTPATIENT
Start: 2019-06-17 | End: 2019-06-17 | Stop reason: SDUPTHER

## 2019-06-17 RX ORDER — PROPOFOL 10 MG/ML
INJECTION, EMULSION INTRAVENOUS PRN
Status: DISCONTINUED | OUTPATIENT
Start: 2019-06-17 | End: 2019-06-17 | Stop reason: SDUPTHER

## 2019-06-17 RX ORDER — MAGNESIUM HYDROXIDE 1200 MG/15ML
LIQUID ORAL CONTINUOUS PRN
Status: COMPLETED | OUTPATIENT
Start: 2019-06-17 | End: 2019-06-17

## 2019-06-17 RX ORDER — LIDOCAINE HYDROCHLORIDE 20 MG/ML
JELLY TOPICAL PRN
Status: DISCONTINUED | OUTPATIENT
Start: 2019-06-17 | End: 2019-06-17 | Stop reason: SDUPTHER

## 2019-06-17 RX ORDER — DEXAMETHASONE SODIUM PHOSPHATE 10 MG/ML
INJECTION INTRAMUSCULAR; INTRAVENOUS PRN
Status: DISCONTINUED | OUTPATIENT
Start: 2019-06-17 | End: 2019-06-17 | Stop reason: SDUPTHER

## 2019-06-17 RX ORDER — FENTANYL CITRATE 50 UG/ML
INJECTION, SOLUTION INTRAMUSCULAR; INTRAVENOUS PRN
Status: DISCONTINUED | OUTPATIENT
Start: 2019-06-17 | End: 2019-06-17 | Stop reason: SDUPTHER

## 2019-06-17 RX ORDER — GLYCOPYRROLATE 1 MG/5 ML
SYRINGE (ML) INTRAVENOUS PRN
Status: DISCONTINUED | OUTPATIENT
Start: 2019-06-17 | End: 2019-06-17 | Stop reason: SDUPTHER

## 2019-06-17 RX ORDER — ROCURONIUM BROMIDE 10 MG/ML
INJECTION, SOLUTION INTRAVENOUS PRN
Status: DISCONTINUED | OUTPATIENT
Start: 2019-06-17 | End: 2019-06-17 | Stop reason: SDUPTHER

## 2019-06-17 RX ORDER — KETAMINE HYDROCHLORIDE 10 MG/ML
INJECTION, SOLUTION INTRAMUSCULAR; INTRAVENOUS PRN
Status: DISCONTINUED | OUTPATIENT
Start: 2019-06-17 | End: 2019-06-17 | Stop reason: SDUPTHER

## 2019-06-17 RX ORDER — SODIUM CHLORIDE, SODIUM LACTATE, POTASSIUM CHLORIDE, CALCIUM CHLORIDE 600; 310; 30; 20 MG/100ML; MG/100ML; MG/100ML; MG/100ML
INJECTION, SOLUTION INTRAVENOUS CONTINUOUS PRN
Status: DISCONTINUED | OUTPATIENT
Start: 2019-06-17 | End: 2019-06-17 | Stop reason: SDUPTHER

## 2019-06-17 RX ORDER — MIDAZOLAM HYDROCHLORIDE 1 MG/ML
INJECTION INTRAMUSCULAR; INTRAVENOUS PRN
Status: DISCONTINUED | OUTPATIENT
Start: 2019-06-17 | End: 2019-06-17 | Stop reason: SDUPTHER

## 2019-06-17 RX ORDER — SUCCINYLCHOLINE/SOD CL,ISO/PF 100 MG/5ML
SYRINGE (ML) INTRAVENOUS PRN
Status: DISCONTINUED | OUTPATIENT
Start: 2019-06-17 | End: 2019-06-17 | Stop reason: SDUPTHER

## 2019-06-17 RX ADMIN — DEXAMETHASONE SODIUM PHOSPHATE 10 MG: 10 INJECTION INTRAMUSCULAR; INTRAVENOUS at 05:46

## 2019-06-17 RX ADMIN — CLINDAMYCIN PHOSPHATE 600 MG: 600 INJECTION, SOLUTION INTRAVENOUS at 19:43

## 2019-06-17 RX ADMIN — DEXAMETHASONE SODIUM PHOSPHATE 10 MG: 10 INJECTION INTRAMUSCULAR; INTRAVENOUS at 21:26

## 2019-06-17 RX ADMIN — THERA TABS 1 TABLET: TAB at 11:06

## 2019-06-17 RX ADMIN — LIDOCAINE HYDROCHLORIDE 20 MG: 20 JELLY TOPICAL at 07:37

## 2019-06-17 RX ADMIN — FENTANYL CITRATE 50 MCG: 50 INJECTION INTRAMUSCULAR; INTRAVENOUS at 07:28

## 2019-06-17 RX ADMIN — Medication 10 ML: at 21:27

## 2019-06-17 RX ADMIN — MOMETASONE FUROATE AND FORMOTEROL FUMARATE DIHYDRATE 2 PUFF: 200; 5 AEROSOL RESPIRATORY (INHALATION) at 19:46

## 2019-06-17 RX ADMIN — FENTANYL CITRATE 50 MCG: 50 INJECTION INTRAMUSCULAR; INTRAVENOUS at 07:46

## 2019-06-17 RX ADMIN — DEXAMETHASONE SODIUM PHOSPHATE 10 MG: 10 INJECTION INTRAMUSCULAR; INTRAVENOUS at 13:19

## 2019-06-17 RX ADMIN — HYDROCODONE BITARTRATE AND ACETAMINOPHEN 1 TABLET: 5; 325 TABLET ORAL at 19:48

## 2019-06-17 RX ADMIN — FENTANYL CITRATE 50 MCG: 50 INJECTION INTRAMUSCULAR; INTRAVENOUS at 08:53

## 2019-06-17 RX ADMIN — HYDROMORPHONE HYDROCHLORIDE 0.5 MG: 1 INJECTION, SOLUTION INTRAMUSCULAR; INTRAVENOUS; SUBCUTANEOUS at 09:00

## 2019-06-17 RX ADMIN — Medication 60 MG: at 07:35

## 2019-06-17 RX ADMIN — FAMOTIDINE 20 MG: 20 TABLET, FILM COATED ORAL at 21:26

## 2019-06-17 RX ADMIN — KETAMINE HYDROCHLORIDE 60 MG: 10 INJECTION INTRAMUSCULAR; INTRAVENOUS at 07:28

## 2019-06-17 RX ADMIN — FAMOTIDINE 20 MG: 20 TABLET, FILM COATED ORAL at 11:05

## 2019-06-17 RX ADMIN — CLINDAMYCIN PHOSPHATE 600 MG: 600 INJECTION, SOLUTION INTRAVENOUS at 07:45

## 2019-06-17 RX ADMIN — MIDAZOLAM HYDROCHLORIDE 2 MG: 1 INJECTION, SOLUTION INTRAMUSCULAR; INTRAVENOUS at 07:24

## 2019-06-17 RX ADMIN — CLINDAMYCIN PHOSPHATE 600 MG: 600 INJECTION, SOLUTION INTRAVENOUS at 03:39

## 2019-06-17 RX ADMIN — ROCURONIUM BROMIDE 10 MG: 10 INJECTION INTRAVENOUS at 08:02

## 2019-06-17 RX ADMIN — Medication 0.5 MG: at 09:00

## 2019-06-17 RX ADMIN — LIDOCAINE HYDROCHLORIDE 30 MG: 10 INJECTION, SOLUTION EPIDURAL; INFILTRATION; INTRACAUDAL; PERINEURAL at 07:28

## 2019-06-17 RX ADMIN — Medication 0.4 MG: at 08:33

## 2019-06-17 RX ADMIN — FOLIC ACID 1 MG: 1 TABLET ORAL at 11:06

## 2019-06-17 RX ADMIN — Medication 0.2 MG: at 07:28

## 2019-06-17 RX ADMIN — SODIUM CHLORIDE, POTASSIUM CHLORIDE, SODIUM LACTATE AND CALCIUM CHLORIDE: 600; 310; 30; 20 INJECTION, SOLUTION INTRAVENOUS at 07:07

## 2019-06-17 RX ADMIN — SODIUM CHLORIDE, POTASSIUM CHLORIDE, SODIUM LACTATE AND CALCIUM CHLORIDE: 600; 310; 30; 20 INJECTION, SOLUTION INTRAVENOUS at 07:41

## 2019-06-17 RX ADMIN — AMLODIPINE BESYLATE 10 MG: 10 TABLET ORAL at 11:06

## 2019-06-17 RX ADMIN — LOSARTAN POTASSIUM 25 MG: 25 TABLET, FILM COATED ORAL at 11:05

## 2019-06-17 RX ADMIN — LIDOCAINE HYDROCHLORIDE 40 MG: 10 INJECTION, SOLUTION EPIDURAL; INFILTRATION; INTRACAUDAL; PERINEURAL at 07:23

## 2019-06-17 RX ADMIN — HYDROCODONE BITARTRATE AND ACETAMINOPHEN 1 TABLET: 5; 325 TABLET ORAL at 13:16

## 2019-06-17 RX ADMIN — ONDANSETRON 4 MG: 2 INJECTION, SOLUTION INTRAMUSCULAR; INTRAVENOUS at 07:59

## 2019-06-17 RX ADMIN — KETAMINE HYDROCHLORIDE 50 MG: 10 INJECTION INTRAMUSCULAR; INTRAVENOUS at 07:40

## 2019-06-17 RX ADMIN — FENTANYL CITRATE 50 MCG: 50 INJECTION INTRAMUSCULAR; INTRAVENOUS at 08:13

## 2019-06-17 RX ADMIN — Medication 3 MG: at 08:33

## 2019-06-17 RX ADMIN — PROPOFOL 50 MG: 10 INJECTION, EMULSION INTRAVENOUS at 07:28

## 2019-06-17 RX ADMIN — ROCURONIUM BROMIDE 30 MG: 10 INJECTION INTRAVENOUS at 07:43

## 2019-06-17 RX ADMIN — DEXAMETHASONE SODIUM PHOSPHATE 10 MG: 10 INJECTION INTRAMUSCULAR; INTRAVENOUS at 07:40

## 2019-06-17 ASSESSMENT — PULMONARY FUNCTION TESTS
PIF_VALUE: 20
PIF_VALUE: 25
PIF_VALUE: 37
PIF_VALUE: 17
PIF_VALUE: 25
PIF_VALUE: 19
PIF_VALUE: 25
PIF_VALUE: 7
PIF_VALUE: 19
PIF_VALUE: 27
PIF_VALUE: 19
PIF_VALUE: 1
PIF_VALUE: 18
PIF_VALUE: 19
PIF_VALUE: 21
PIF_VALUE: 25
PIF_VALUE: 19
PIF_VALUE: 22
PIF_VALUE: 1
PIF_VALUE: 1
PIF_VALUE: 25
PIF_VALUE: 20
PIF_VALUE: 17
PIF_VALUE: 24
PIF_VALUE: 2
PIF_VALUE: 4
PIF_VALUE: 19
PIF_VALUE: 25
PIF_VALUE: 5
PIF_VALUE: 4
PIF_VALUE: 20
PIF_VALUE: 1
PIF_VALUE: 25
PIF_VALUE: 19
PIF_VALUE: 2
PIF_VALUE: 22
PIF_VALUE: 21
PIF_VALUE: 32
PIF_VALUE: 30
PIF_VALUE: 1
PIF_VALUE: 17
PIF_VALUE: 25
PIF_VALUE: 1
PIF_VALUE: 25
PIF_VALUE: 19
PIF_VALUE: 22
PIF_VALUE: 19
PIF_VALUE: 5
PIF_VALUE: 6
PIF_VALUE: 31
PIF_VALUE: 25
PIF_VALUE: 1
PIF_VALUE: 3
PIF_VALUE: 30
PIF_VALUE: 26
PIF_VALUE: 25
PIF_VALUE: 25
PIF_VALUE: 19
PIF_VALUE: 17
PIF_VALUE: 1
PIF_VALUE: 6
PIF_VALUE: 25
PIF_VALUE: 1
PIF_VALUE: 21
PIF_VALUE: 25
PIF_VALUE: 24
PIF_VALUE: 33
PIF_VALUE: 1
PIF_VALUE: 30
PIF_VALUE: 1
PIF_VALUE: 26
PIF_VALUE: 6
PIF_VALUE: 1
PIF_VALUE: 24
PIF_VALUE: 2
PIF_VALUE: 25
PIF_VALUE: 25
PIF_VALUE: 20
PIF_VALUE: 25
PIF_VALUE: 22
PIF_VALUE: 26
PIF_VALUE: 1
PIF_VALUE: 19
PIF_VALUE: 25
PIF_VALUE: 27
PIF_VALUE: 1
PIF_VALUE: 25
PIF_VALUE: 24

## 2019-06-17 ASSESSMENT — PAIN SCALES - GENERAL
PAINLEVEL_OUTOF10: 10
PAINLEVEL_OUTOF10: 6
PAINLEVEL_OUTOF10: 10

## 2019-06-17 ASSESSMENT — PAIN DESCRIPTION - DESCRIPTORS: DESCRIPTORS: THROBBING

## 2019-06-17 ASSESSMENT — LIFESTYLE VARIABLES: SMOKING_STATUS: 1

## 2019-06-17 ASSESSMENT — PAIN DESCRIPTION - PAIN TYPE
TYPE: ACUTE PAIN;SURGICAL PAIN
TYPE: ACUTE PAIN;SURGICAL PAIN

## 2019-06-17 ASSESSMENT — COPD QUESTIONNAIRES: CAT_SEVERITY: SEVERE

## 2019-06-17 ASSESSMENT — PAIN SCALES - WONG BAKER: WONGBAKER_NUMERICALRESPONSE: 0

## 2019-06-17 ASSESSMENT — PAIN DESCRIPTION - LOCATION: LOCATION: THROAT

## 2019-06-17 NOTE — PLAN OF CARE
Patient medicated for pain as needed, patient had excision of polyps from vocal cords, patient has hoarse voice, ice pack to neck intact for comfort as needed

## 2019-06-17 NOTE — BRIEF OP NOTE
Brief Postoperative Note  ______________________________________________________________    Patient: Saman Adventist Call  YOB: 1975  MRN: [id number]  Date of Procedure: 6/17/2019    Pre-Op Diagnosis: VOCAL CORD POLYP    Post-Op Diagnosis: Same       Procedure(s):  DIRECT LARYNGOSCOPY MICRO WITH EXCISION OF BILATERAL VOCAL CORD POLYPS    Anesthesia: General    Surgeon(s):  Vanita Perez MD    Assistant: none    Estimated Blood Loss (mL): minimal    Complications: None    Specimens:   ID Type Source Tests Collected by Time Destination   A : left vocal chord masses Tissue Vocal Cord SURGICAL PATHOLOGY Vanita Perez MD 6/17/2019 0746    B : right vocal cord masses Tissue Vocal Cord SURGICAL PATHOLOGY Vanita Perez MD 6/17/2019 8543      Findings: dictated    Vanita Perez MD  Date: 6/17/2019  Time: 8:56 AM

## 2019-06-17 NOTE — PROGRESS NOTES
Smoking Cessation - topics covered   []  Health Risks  []  Benefits of Quitting   []  Smoking Cessation  []  Patient has no history of tobacco use  []  Patient is former smoker. []  No need for tobacco cessation education. []  Booklet given  []  Patient verbalizes understanding. []  Patient denies need for tobacco cessation education. [x]  Unable to meet with patient today. Will follow up as able.   Eliel aFy  3:20 PM

## 2019-06-17 NOTE — ANESTHESIA PRE PROCEDURE
Department of Anesthesiology  Preprocedure Note       Name:  Veronique Mckinney Call   Age:  53 y.o.  :  1975                                          MRN:  [id number]         Date:  2019      Surgeon: Hay Duque):  Balaji Elizabeth MD    Procedure: DIRECT LARYNGOSCOPY MICRO WITH EXCISION OF BILATERAL VOCAL CORD POLYPS (Bilateral )    Medications prior to admission:   Prior to Admission medications    Medication Sig Start Date End Date Taking?  Authorizing Provider   albuterol sulfate HFA (PROVENTIL HFA) 108 (90 Base) MCG/ACT inhaler Inhale 1-2 puffs into the lungs every 4 hours as needed for Wheezing 19  Yes Nacho Law MD   amLODIPine (NORVASC) 10 MG tablet Take 1 tablet by mouth daily 19  Yes Nacho Law MD   albuterol (PROVENTIL) (5 MG/ML) 0.5% nebulizer solution Take 1 mL by nebulization 4 times daily as needed for Wheezing 19  Yes Nacho Law MD   Nebulizers (COMPRESSOR/NEBULIZER) MISC Use as directed 19   Willy Sr MD   Nebulizers (COMPRESSOR/NEBULIZER) MISC Use as needed for wheezing every 6 hrs 19   Jesus Fowler MD   budesonide-formoterol (SYMBICORT) 160-4.5 MCG/ACT AERO Inhale 2 puffs into the lungs 2 times daily 19   Nacho Law MD   guaiFENesin (MUCINEX) 600 MG extended release tablet Take 1 tablet by mouth 2 times daily 19   Jason Rutledge MD   fluticasone [facility]) 50 MCG/ACT nasal spray 2 sprays by Each Nare route daily 19   Jason Rutledge MD   nicotine (NICODERM CQ) 21 MG/24HR Place 1 patch onto the skin daily 19   Jason Rutledge MD       Current medications:    Current Facility-Administered Medications   Medication Dose Route Frequency Provider Last Rate Last Dose    [MAR Hold] losartan (COZAAR) tablet 25 mg  25 mg Oral Daily Frederic Ayers MD   25 mg at 19 0428    [MAR Hold] nicotine (NICODERM CQ) 21 MG/24HR 1 patch  1 patch Transdermal Daily Frederic Ayers MD        [MAR Hold] albuterol Daily Frederic Ayers MD   1 mg at 06/16/19 0859    [MAR Hold] multivitamin 1 tablet  1 tablet Oral Daily Frederic Ayers MD   1 tablet at 06/16/19 0859    [MAR Hold] 0.9 % sodium chloride infusion   Intravenous Continuous Frederic Ayers  mL/hr at 06/15/19 2214      [MAR Hold] mometasone-formoterol (DULERA) 200-5 MCG/ACT inhaler 2 puff  2 puff Inhalation BID Frederic Ayers MD   2 puff at 06/16/19 2013    [MAR Hold] amLODIPine (NORVASC) tablet 10 mg  10 mg Oral Daily Frederic Ayers MD   10 mg at 06/16/19 0859    [MAR Hold] labetalol (NORMODYNE;TRANDATE) injection 10 mg  10 mg Intravenous Q4H PRN Frederic Ayers MD           Allergies: Allergies   Allergen Reactions    Tramadol      Unknown reaction       Problem List:    Patient Active Problem List   Diagnosis Code    Dog bite of face S01.85XA, W54. 0XXA    Acute alcohol intoxication ([facility] F10.929    Dog bite of finger S61.259A, W54. 0XXA    COPD exacerbation ([facility] J44.1    Obesity E66.9    Essential hypertension I10    Alcohol abuse F10.10    Smoker F17.200    Non-compliance Z91.19    Cigarette nicotine dependence  F17.210    Lactic acidosis E87.2    Bronchitis J40    Inspiratory stridor R06.1    Laryngeal polyp J38.1       Past Medical History:        Diagnosis Date    Asthma     Chronic kidney disease     COPD (chronic obstructive pulmonary disease) ([facility]     Hypertension     Liver disease        Past Surgical History:        Procedure Laterality Date    OK EXCISION OF BONE, LOWER JAW N/A 3/17/2017    COMPLEX LACERATION REPAIR WITH DEBRIDEMENT performed by Clifford Zayas DDS at [address] [facility] History:    Social History     Tobacco Use    Smoking status: Current Every Day Smoker     Packs/day: 1.00     Years: 15.00     Pack years: 15.00     Types: Cigarettes    Smokeless tobacco: Never Used    Tobacco comment: gum ordered   Substance Use Topics    Alcohol use:  Yes Drinks per session: 5 or 6     Comment: drinks frequently                                Ready to quit: Not Answered  Counseling given: Not Answered  Comment: gum ordered      Vital Signs (Current):   Vitals:    06/16/19 1754 06/16/19 2010 06/16/19 2014 06/17/19 0636   BP: (!) 152/80 134/81  122/88   Pulse: 90 69  62   Resp: 16 18 18 16   Temp: 97.8 °F (36.6 °C) 98.6 °F (37 °C)  97.3 °F (36.3 °C)   TempSrc: Oral Oral  Temporal   SpO2: 98% 98%  99%   Weight:       Height:                                                  BP Readings from Last 3 Encounters:   06/17/19 122/88   06/05/19 (!) 132/91   05/21/19 118/69       NPO Status: Time of last liquid consumption: 2300                        Time of last solid consumption: 2300                        Date of last liquid consumption: 06/16/19                        Date of last solid food consumption: 06/16/19    BMI:   Wt Readings from Last 3 Encounters:   06/16/19 204 lb 12.9 oz (92.9 kg)   06/05/19 201 lb (91.2 kg)   05/20/19 201 lb 8 oz (91.4 kg)     Body mass index is 36.28 kg/m². CBC:   Lab Results   Component Value Date    WBC PENDING 06/17/2019    RBC 4.53 06/17/2019    HGB 14.7 06/17/2019    HCT 45.6 06/17/2019    .7 06/17/2019    RDW 13.5 06/17/2019    PLT PENDING 06/17/2019       CMP:   Lab Results   Component Value Date     06/17/2019    K 4.1 06/17/2019     06/17/2019    CO2 23 06/17/2019    BUN 16 06/17/2019    CREATININE 0.64 06/17/2019    GFRAA >60 06/17/2019    LABGLOM >60 06/17/2019    GLUCOSE 140 06/17/2019    PROT 7.1 06/15/2019    CALCIUM 8.9 06/17/2019    BILITOT 0.36 06/15/2019    ALKPHOS 74 06/15/2019    AST 27 06/15/2019    ALT 27 06/15/2019       POC Tests:   Recent Labs     06/15/19  1818  06/16/19  2128   POCGLU 102*   < > 178*   POCNA 133*  --   --    POCK 4.1  --   --    POCCL 97*  --   --    POCHEMO 16.2*  --   --    POCHCT 48*  --   --     < > = values in this interval not displayed.        Coags:   Lab Results Component Value Date    PROTIME 9.8 06/15/2019    INR 0.9 06/15/2019       HCG (If Applicable):   Lab Results   Component Value Date    PREGTESTUR NEGATIVE 12/06/2016        ABGs: No results found for: PHART, PO2ART, YRT9IIQ, ACJ9APY, BEART, Y2XRZEAI     Type & Screen (If Applicable):  No results found for: LABABO, [address]    Anesthesia Evaluation  Patient summary reviewed no history of anesthetic complications:   Airway: Mallampati: IV       Mouth opening: < 3 FB Dental: normal exam     Comment: Poor. Pulmonary:   (+) COPD: severe,  decreased breath sounds,  asthma: current smoker          Patient did not smoke on day of surgery. Cardiovascular:    (+) hypertension:,       ECG reviewed                        Neuro/Psych:   (+) psychiatric history:            GI/Hepatic/Renal:   (+) liver disease:,           Endo/Other: Negative Endo/Other ROS             Pt had no PAT visit       Abdominal:           Vascular:                                        Anesthesia Plan      general     ASA 3       Induction: intravenous. MIPS: Postoperative opioids intended. Anesthetic plan and risks discussed with patient. Plan discussed with CRNA.                   Luis Miguel Miller MD   6/17/2019

## 2019-06-17 NOTE — PROGRESS NOTES
chloride flush 10 mL PRN   magnesium hydroxide 30 mL Daily PRN   ondansetron 4 mg Q6H PRN   potassium chloride 40 mEq PRN   Or     potassium alternative oral replacement 40 mEq PRN   Or     potassium chloride 10 mEq PRN   potassium chloride 10 mEq PRN   magnesium sulfate 1 g PRN   acetaminophen 650 mg Q4H PRN   labetalol 10 mg Q4H PRN       Diagnostic Labs and Imaging:  CBC:  Recent Labs     06/15/19  1827 06/16/19  0522 06/17/19  0554   WBC 7.2 4.0 13.8*   HGB 14.4 14.7 14.7    238 243     BMP:   Recent Labs     06/15/19  1827 06/16/19  0522 06/17/19  0554   * 130* 134*   K 4.1 4.4 4.1   CL 96* 97* 102   CO2 25 23 23   BUN 10 11 16   CREATININE 0.63 0.52 0.64   GLUCOSE 100* 161* 140*     Hepatic:   Recent Labs     06/15/19  1827   AST 27   ALT 27   BILITOT 0.36   ALKPHOS 74       Assessment and Plan:   1. Vocal cord polyps S/P excision on 6/17/2019 : IV hydration 100 mlper hour. IV dexamethasone 10 mg q8h, Clindamycin  mg q8h. ENT on board. 2. Essential hypertension : Norvasc 10 mg daily, losartan 25 mg daily. 3. Alcohol use disorder : Folate, thiamine, multivitamin supplementation. Beer with meals to prevent alcohol withdrawals  4. Nicotine dependence : nicotine patch  5. lovenox for DVT prophylaxis  6. Discharge planning: likely tomorrow pending ENT recommendations. Bhaskar Zurita MD  PGY-3, Internal medicine resident  [place], [facility], [address] [place] [address] Main:    I have discussed the case, including pertinent history and exam findings with the residents. I have seen and examined the patient and the key elements of the encounter have been performed by me. I have reviewed the laboratory data, other diagnostic studies and discussed them with the residents. I have updated the medical record where necessary. · Stridor. On steroids  · Vocal cord polyps  · ETOH and nicotine abuse.  Will give beer with meals to prevent withdrawal  · HTN  · ENT evaluation appreciated: Bilateral vocal cord edema with mild laryngeal obstruction   · Surgical intervention today with micro excision of bilateral cord polyps.     I agree with the assessment, plan and orders as documented by the resident.     Tasha Wilson MD.

## 2019-06-18 VITALS
HEIGHT: 63 IN | OXYGEN SATURATION: 99 % | SYSTOLIC BLOOD PRESSURE: 165 MMHG | HEART RATE: 77 BPM | DIASTOLIC BLOOD PRESSURE: 93 MMHG | BODY MASS INDEX: 36.29 KG/M2 | RESPIRATION RATE: 14 BRPM | TEMPERATURE: 98.6 F | WEIGHT: 204.81 LBS

## 2019-06-18 LAB
ABSOLUTE EOS #: <0.03 K/UL (ref 0–0.44)
ABSOLUTE IMMATURE GRANULOCYTE: 0.07 K/UL (ref 0–0.3)
ABSOLUTE LYMPH #: 0.92 K/UL (ref 1.1–3.7)
ABSOLUTE MONO #: 0.38 K/UL (ref 0.1–1.2)
ANION GAP SERPL CALCULATED.3IONS-SCNC: 10 MMOL/L (ref 9–17)
BASOPHILS # BLD: 0 % (ref 0–2)
BASOPHILS ABSOLUTE: <0.03 K/UL (ref 0–0.2)
BUN BLDV-MCNC: 21 MG/DL (ref 6–20)
BUN/CREAT BLD: ABNORMAL (ref 9–20)
CALCIUM SERPL-MCNC: 8.7 MG/DL (ref 8.6–10.4)
CHLORIDE BLD-SCNC: 100 MMOL/L (ref 98–107)
CO2: 23 MMOL/L (ref 20–31)
CREAT SERPL-MCNC: 0.79 MG/DL (ref 0.5–0.9)
DIFFERENTIAL TYPE: ABNORMAL
EOSINOPHILS RELATIVE PERCENT: 0 % (ref 1–4)
GFR AFRICAN AMERICAN: >60 ML/MIN
GFR NON-AFRICAN AMERICAN: >60 ML/MIN
GFR SERPL CREATININE-BSD FRML MDRD: ABNORMAL ML/MIN/{1.73_M2}
GFR SERPL CREATININE-BSD FRML MDRD: ABNORMAL ML/MIN/{1.73_M2}
GLUCOSE BLD-MCNC: 129 MG/DL (ref 70–99)
HCT VFR BLD CALC: 43.7 % (ref 36.3–47.1)
HEMOGLOBIN: 14.1 G/DL (ref 11.9–15.1)
IMMATURE GRANULOCYTES: 1 %
LYMPHOCYTES # BLD: 8 % (ref 24–43)
MCH RBC QN AUTO: 32.8 PG (ref 25.2–33.5)
MCHC RBC AUTO-ENTMCNC: 32.3 G/DL (ref 28.4–34.8)
MCV RBC AUTO: 101.6 FL (ref 82.6–102.9)
MONOCYTES # BLD: 4 % (ref 3–12)
NRBC AUTOMATED: 0 PER 100 WBC
PDW BLD-RTO: 13.7 % (ref 11.8–14.4)
PLATELET # BLD: 240 K/UL (ref 138–453)
PLATELET ESTIMATE: ABNORMAL
PMV BLD AUTO: 9.3 FL (ref 8.1–13.5)
POTASSIUM SERPL-SCNC: 4.6 MMOL/L (ref 3.7–5.3)
RBC # BLD: 4.3 M/UL (ref 3.95–5.11)
RBC # BLD: ABNORMAL 10*6/UL
SEG NEUTROPHILS: 87 % (ref 36–65)
SEGMENTED NEUTROPHILS ABSOLUTE COUNT: 9.53 K/UL (ref 1.5–8.1)
SODIUM BLD-SCNC: 133 MMOL/L (ref 135–144)
SURGICAL PATHOLOGY REPORT: NORMAL
WBC # BLD: 10.9 K/UL (ref 3.5–11.3)
WBC # BLD: ABNORMAL 10*3/UL

## 2019-06-18 PROCEDURE — 94760 N-INVAS EAR/PLS OXIMETRY 1: CPT

## 2019-06-18 PROCEDURE — 36415 COLL VENOUS BLD VENIPUNCTURE: CPT

## 2019-06-18 PROCEDURE — 6360000002 HC RX W HCPCS: Performed by: OTOLARYNGOLOGY

## 2019-06-18 PROCEDURE — 99232 SBSQ HOSP IP/OBS MODERATE 35: CPT | Performed by: INTERNAL MEDICINE

## 2019-06-18 PROCEDURE — 6370000000 HC RX 637 (ALT 250 FOR IP): Performed by: OTOLARYNGOLOGY

## 2019-06-18 PROCEDURE — 94640 AIRWAY INHALATION TREATMENT: CPT

## 2019-06-18 PROCEDURE — 97535 SELF CARE MNGMENT TRAINING: CPT

## 2019-06-18 PROCEDURE — 80048 BASIC METABOLIC PNL TOTAL CA: CPT

## 2019-06-18 PROCEDURE — 2500000003 HC RX 250 WO HCPCS: Performed by: OTOLARYNGOLOGY

## 2019-06-18 PROCEDURE — 99238 HOSP IP/OBS DSCHRG MGMT 30/<: CPT | Performed by: INTERNAL MEDICINE

## 2019-06-18 PROCEDURE — 85025 COMPLETE CBC W/AUTO DIFF WBC: CPT

## 2019-06-18 PROCEDURE — 97165 OT EVAL LOW COMPLEX 30 MIN: CPT

## 2019-06-18 PROCEDURE — 6370000000 HC RX 637 (ALT 250 FOR IP): Performed by: HOSPITALIST

## 2019-06-18 RX ORDER — MULTIVITAMIN WITH FOLIC ACID 400 MCG
1 TABLET ORAL DAILY
Qty: 30 TABLET | Refills: 0 | Status: SHIPPED | OUTPATIENT
Start: 2019-06-19 | End: 2019-07-17 | Stop reason: SDUPTHER

## 2019-06-18 RX ORDER — AMOXICILLIN 500 MG/1
500 CAPSULE ORAL EVERY 8 HOURS SCHEDULED
Status: DISCONTINUED | OUTPATIENT
Start: 2019-06-18 | End: 2019-06-18 | Stop reason: HOSPADM

## 2019-06-18 RX ORDER — LANOLIN ALCOHOL/MO/W.PET/CERES
100 CREAM (GRAM) TOPICAL DAILY
Qty: 30 TABLET | Refills: 3 | Status: SHIPPED | OUTPATIENT
Start: 2019-06-19 | End: 2021-10-09

## 2019-06-18 RX ORDER — LOSARTAN POTASSIUM 25 MG/1
25 TABLET ORAL DAILY
Qty: 30 TABLET | Refills: 3 | Status: SHIPPED | OUTPATIENT
Start: 2019-06-19 | End: 2021-10-09

## 2019-06-18 RX ORDER — AMOXICILLIN 500 MG/1
500 CAPSULE ORAL EVERY 8 HOURS SCHEDULED
Qty: 30 CAPSULE | Refills: 0 | Status: SHIPPED | OUTPATIENT
Start: 2019-06-18 | End: 2019-06-28

## 2019-06-18 RX ADMIN — FOLIC ACID 1 MG: 1 TABLET ORAL at 10:17

## 2019-06-18 RX ADMIN — HYDROCODONE BITARTRATE AND ACETAMINOPHEN 1 TABLET: 5; 325 TABLET ORAL at 10:17

## 2019-06-18 RX ADMIN — MOMETASONE FUROATE AND FORMOTEROL FUMARATE DIHYDRATE 2 PUFF: 200; 5 AEROSOL RESPIRATORY (INHALATION) at 08:33

## 2019-06-18 RX ADMIN — CLINDAMYCIN PHOSPHATE 600 MG: 600 INJECTION, SOLUTION INTRAVENOUS at 04:45

## 2019-06-18 RX ADMIN — AMLODIPINE BESYLATE 10 MG: 10 TABLET ORAL at 10:17

## 2019-06-18 RX ADMIN — LOSARTAN POTASSIUM 25 MG: 25 TABLET, FILM COATED ORAL at 10:17

## 2019-06-18 RX ADMIN — FAMOTIDINE 20 MG: 20 TABLET, FILM COATED ORAL at 10:17

## 2019-06-18 RX ADMIN — HYDROCODONE BITARTRATE AND ACETAMINOPHEN 1 TABLET: 5; 325 TABLET ORAL at 17:23

## 2019-06-18 RX ADMIN — HYDROCODONE BITARTRATE AND ACETAMINOPHEN 1 TABLET: 5; 325 TABLET ORAL at 03:26

## 2019-06-18 RX ADMIN — Medication 100 MG: at 10:17

## 2019-06-18 RX ADMIN — AMOXICILLIN 500 MG: 500 CAPSULE ORAL at 15:46

## 2019-06-18 RX ADMIN — DEXAMETHASONE SODIUM PHOSPHATE 10 MG: 10 INJECTION INTRAMUSCULAR; INTRAVENOUS at 05:44

## 2019-06-18 ASSESSMENT — PAIN DESCRIPTION - LOCATION: LOCATION: THROAT

## 2019-06-18 ASSESSMENT — PAIN SCALES - GENERAL
PAINLEVEL_OUTOF10: 9
PAINLEVEL_OUTOF10: 8
PAINLEVEL_OUTOF10: 9
PAINLEVEL_OUTOF10: 10

## 2019-06-18 ASSESSMENT — PAIN DESCRIPTION - PAIN TYPE: TYPE: ACUTE PAIN;SURGICAL PAIN

## 2019-06-18 NOTE — PLAN OF CARE
Problem: Pain:  Goal: Pain level will decrease  Description  Pain level will decrease  6/18/2019 0506 by Catherine Bonilla RN  Outcome: Ongoing  6/17/2019 1636 by Alber Carrasquillo RN  Outcome: Ongoing  Goal: Control of acute pain  Description  Control of acute pain  6/18/2019 0506 by Catherine Bonilla RN  Outcome: Ongoing  6/17/2019 1636 by Alber Carrasquillo RN  Outcome: Ongoing  Goal: Control of chronic pain  Description  Control of chronic pain  6/18/2019 0506 by Catherine Bonilla RN  Outcome: Ongoing  6/17/2019 1636 by Alber Carrasquillo RN  Outcome: Ongoing

## 2019-06-18 NOTE — PROGRESS NOTES
Physical Therapy  DATE: 2019    NAME: Yobani Coppola  MRN: [id number]   : 1975    Patient not seen this date for Physical Therapy due to:  [] Blood transfusion in progress  [] Hemodialysis  []  Patient Declined  [] Spine Precautions   [] Strict Bedrest  [] Surgery/ Procedure  [] Testing      [x] Other- per RN, pt discharging this afternoon. [] PT being discontinued at this time. Patient independent. No further needs. [] PT being discontinued at this time as the patient has been transferred to palliative care. No further needs.     Alex Sharp, PT

## 2019-06-18 NOTE — OP NOTE
LOSS:  Minimal.    SPECIMENS:  1. Right true vocal cord mass. 2.  Left true vocal cord mass. DISPOSITION:  Postoperative recovery unit in stable condition.         Lisy Nielsen    D: 06/17/2019 13:11:17       T: 06/17/2019 13:15:39     [email or web address]  Job#: [id number]     Doc#: [id number]    CC:

## 2019-06-18 NOTE — PROGRESS NOTES
[facility]  Internal Medicine Residency Program  Inpatient Daily Progress Note  ______________________________________________________________________________    Patient: Anne Lux Call  YOB: 1975   MRN: [id number]    Acct: [de-identified]     Admit date: 6/15/2019  Today's date: 06/18/19  Number of days in the hospital: 3  Expected Discharge Date: 06/18/19    Admitting Diagnosis: Inspiratory stridor    Subjective:    no SOB  Mild hoarseness  No withdrawals   flexible laryngoscopy shows vocal cords are normal  ENT ok to D/C    Objective:   Vital Sign:  BP (!) 165/93   Pulse 77   Temp 98.6 °F (37 °C)   Resp 14   Ht 5' 3\" (1.6 m)   Wt 204 lb 12.9 oz (92.9 kg)   SpO2 99%   BMI 36.28 kg/m²       Physical Exam:  General appearance:   alert, well appearing, and in no distress  Mental Status: alert, oriented to person, place, and time  Neurologic:  alert, oriented, normal speech, no focal findings or movement disorder noted  Lungs: stridor  on examination  Heart[de-identified] normal rate, regular rhythm, normal S1, S2, no murmurs, rubs, clicks or gallops  Abdomen:  soft, nontender, nondistended, no masses or organomegaly  Extremities: peripheral pulses normal, no pedal edema, no clubbing or cyanosis   Skin: normal coloration and turgor, no rashes, no suspicious skin lesions noted    Medications:  Scheduled Medications   amoxicillin  500 mg Oral 3 times per day    losartan  25 mg Oral Daily    nicotine  1 patch Transdermal Daily    sodium chloride flush  10 mL Intravenous 2 times per day    famotidine  20 mg Oral BID    thiamine  100 mg Oral Daily    folic acid  1 mg Oral Daily    multivitamin  1 tablet Oral Daily    mometasone-formoterol  2 puff Inhalation BID    amLODIPine  10 mg Oral Daily       PRN Medications    HYDROcodone 5 mg - acetaminophen 1 tablet Q6H PRN   albuterol sulfate HFA 2 puff Q4H PRN   sodium chloride flush 10 mL PRN   magnesium hydroxide 30 mL Daily PRN   ondansetron 4 mg Q6H PRN   potassium chloride 40 mEq PRN   Or     potassium alternative oral replacement 40 mEq PRN   Or     potassium chloride 10 mEq PRN   potassium chloride 10 mEq PRN   magnesium sulfate 1 g PRN   acetaminophen 650 mg Q4H PRN   labetalol 10 mg Q4H PRN       Diagnostic Labs and Imaging:  CBC:  Recent Labs     06/16/19  0522 06/17/19  0554 06/18/19  0630   WBC 4.0 13.8* 10.9   HGB 14.7 14.7 14.1    243 240     BMP:   Recent Labs     06/16/19  0522 06/17/19  0554 06/18/19  0630   * 134* 133*   K 4.4 4.1 4.6   CL 97* 102 100   CO2 23 23 23   BUN 11 16 21*   CREATININE 0.52 0.64 0.79   GLUCOSE 161* 140* 129*     Hepatic:   Recent Labs     06/15/19  1827   AST 27   ALT 27   BILITOT 0.36   ALKPHOS 74       Assessment and Plan:   1. Vocal cord polyps S/P excision on 6/17/2019 : stable. Amoxicillin 500 mg TID x 1 week. Outpatient F/U in 3 weeks. 2. Essential hypertension : Norvasc 10 mg daily, losartan 25 mg daily. 3. Alcohol use disorder : Folate, thiamine, multivitamin supplementation. Beer with meals to prevent alcohol withdrawals  4. Nicotine dependence : nicotine patch  5. lovenox for DVT prophylaxis    Discharge today. Outpatient F/U Dr. Francisco De Jesus in 3 weeks. Ady Velasco MD,  Internal Medicine Resident, PGY-1,   [address] ATTESTATION:    I have discussed the case, including pertinent history and exam findings with the residents. I have seen and examined the patient and the key elements of the encounter have been performed by me. I have reviewed the laboratory data, other diagnostic studies and discussed them with the residents. I have updated the medical record where necessary. · Stridor. On steroids  · Vocal cord polyps  · ETOH and nicotine abuse.  Will give beer with meals to prevent withdrawal  · HTN  · ENT evaluation appreciated: Bilateral vocal cord edema with mild laryngeal obstruction   · Surgical intervention yesterday with micro excision of bilateral cord polyps. · Discussed OR findings with Dr DESAI  · Patient to go home today  · Smoking and ETOH cessation discussed with patient. I agree with the assessment, plan and orders as documented by the resident.     Debbie Cooper MD.    6/18/2019,12:35 PM

## 2019-06-18 NOTE — PROGRESS NOTES
CLINICAL PHARMACY NOTE: MEDS TO [address] Select Patient?: No  Total # of Prescriptions Filled: 5   The following medications were delivered to the patient:  · TAB A POLINA  · VITAMIN B  · LOSARTAN  · AMOXICILLIN  · ALBUTEROL  Total # of Interventions Completed: 0  Time Spent (min): 0    Additional Documentation: MEDS DELIVERED TO THE PATIENTS ROOM ON 6.18.19

## 2019-06-18 NOTE — PROGRESS NOTES
Occupational Therapy   Occupational Therapy Initial Assessment  Date: 2019   Patient Name: Riya Cox Call  MRN: [id number]     : 1975    Date of Service: 2019    Discharge Recommendations: Further therapy recommended at discharge. Copied from H&P: The patient is a pleasant 53 y.o. female with significant past medical history of COPD, active smoker, essential hypertension, alcohol abuse to the ED by EMS due to complaints of dyspnea or stridor from past 3 days. Patient was found to be dyspneic at home, tachypneic on examination with inspiratory stridor, patient was given racemic epinephrine infusion and Solu-Medrol for possible  drug reaction. Patient denies any wheezing, cough, fever, chest pain, fever. ENT was consulted from the ED for evaluation of stridor, a flexible nasal laryngoscope was done in the ED which showed possible polyps noted on the vocal cord. She denies any dysphagia states she had breakfast in the morning. Patient states she drinks around 5-6 beers in a day, her last drink was yesterday. Patient states she goes into withdrawal if she does not drink alcohol. Is afebrile, heart rate in 80s, blood pressure with systolic in 173T and diastolic in 936J, saturating at 100% on  nasal cannula, 133, urine drug screen positive for benzodiazepines and cannabis. ENT was consulted from the ED, advised for Decadron 10 mg IV every 8 hours and clindamycin. Equipment recommendations listed below are based on what the patient would need if they were able to return to prior living arrangements at the time of discharge. OT Equipment Recommendations  Equipment Needed: Yes  Mobility Devices: ADL Assistive Devices  ADL Assistive Devices: Grab Bars - shower    Assessment   Performance deficits / Impairments: Decreased ADL status; Decreased endurance;Decreased functional mobility   Assessment: Pt presents with deficits in performance areas noted above limiting independence in ADLs/functional activities. Pt's main limiting factor is low endurance secondary to COPD. Pt would benefit from further OT services to promote safety and independence in self-care tasks. Treatment Diagnosis: Inspiratory stridor due to vocal cord polyps   Prognosis: Good  Decision Making: Low Complexity  Patient Education: educated pt on OTPOC, safety with mobility/transfers, pursed lip breathing techniques, good return  Barriers to Learning: none  REQUIRES OT FOLLOW UP: Yes  Activity Tolerance  Activity Tolerance: Patient limited by fatigue  Safety Devices  Safety Devices in place: Yes  Type of devices: Gait belt;Left in bed;Call light within reach;Nurse notified  Restraints  Initially in place: No           Patient Diagnosis(es): The primary encounter diagnosis was Vocal cord polyps. Diagnoses of Inspiratory stridor and Chronic obstructive pulmonary disease, unspecified COPD type ([address] were also pertinent to this visit. has a past medical history of Asthma, Chronic kidney disease, COPD (chronic obstructive pulmonary disease) ([address] Hypertension, and Liver disease. has a past surgical history that includes pr excision of bone, lower jaw (N/A, 3/17/2017); laryngoscopy (06/17/2019); and laryngoscopy (Bilateral, 6/17/2019). Treatment Diagnosis: Inspiratory stridor due to vocal cord polyps       Restrictions  Restrictions/Precautions  Restrictions/Precautions: Up as Tolerated  Required Braces or Orthoses?: No    Subjective   General  Chart Reviewed: No  Patient assessed for rehabilitation services?: Yes  Family / Caregiver Present: No  Pain Assessment  Pain Assessment: 0-10  Pain Level: 9  Pain Type: Acute pain;Surgical pain  Pain Location: Throat  Non-Pharmaceutical Pain Intervention(s): Repositioned; Therapeutic presence; Ambulation/Increased Activity; Distraction  Response to Pain Intervention: Patient Satisfied  Oxygen Therapy  O2 Device: None (Room air)    Social/Functional History  Social/Functional History  Lives With: Son  Type of Home: House  Home Layout: One level  Home Access: Stairs to enter with rails  Entrance Stairs - Number of Steps: 3  Entrance Stairs - Rails: Both(cannot reach both at same time, typically uses L rail)  Bathroom Shower/Tub: Tub/Shower unit, Shower chair with back, Curtain  Bathroom Toilet: Standard  Bathroom Equipment: Shower chair(no grab bars)  Home Equipment: (none)  ADL Assistance: [address]: Independent  Homemaking Responsibilities: Yes  Ambulation Assistance: Independent  Transfer Assistance: Independent  Active : No  Patient's  Info: per pt, legally blind, unable to qualify for drivers license. pt's son or son's girlfriend or pt's friends drive for errands, uses medical cab for dr's appointments  Mode of Transportation: Car, Cab, Family, Friends  Occupation: Unemployed  IADL Comments: Pt leans on grocery cart and walks when shopping. Additional Comments: Pt reports RN comes to home 1x/wk and a physical therapist used to come 1x/wk however pt reports primary doctor wrote script for outpatient PT and pt has not gone yet d/t hospitalization.        Objective   Vision: Impaired  Vision Exceptions: Wears glasses for reading(per patient, legally blind in lower light. has glasses in hospital for reading only)  Hearing: Within functional limits    Orientation  Overall Orientation Status: Within Functional Limits     Balance  Sitting Balance: Independent  Standing Balance: Contact guard assistance  Standing Balance  Time: ~5 minutes  Activity: static standing, func mob to/from bathroom  Comment: no LOB, min unsteadiness, increase in SOB upon return to seated EOB  Functional Mobility  Functional - Mobility Device: No device  Activity: To/from bathroom  Assist Level: Contact guard assistance  Functional Mobility Comments: no LOB, min unsteadiness, increase in SOB upon return to seated EOB  ADL  Feeding: Setup;Modified independent   Grooming: Setup;Modified independent   UE Bathing: Supervision;Setup  LE Bathing: Stand by assistance;Setup  UE Dressing: Supervision;Setup  LE Dressing: Stand by assistance;Setup  Toileting: Supervision  Additional Comments: Pt sitting EOB upon arrival. Pt demo'd good sitting balance with trunk unsupported. Pt donned tennis shoes sitting EOB and demo'd sit > stand transfer. Pt demo'd func mob to/from bathroom without use of AD, mildly unsteady but no LOB noted. Pt report increase in fatigue upon returning seated EOB, states with COPD pt needs to take frequent rest breaks during activities. Pt transferred from sitting EOB to supine in bed d/t physican arriving for a bedside scope procedure. Pt left supine in bed, call light within reach, needs met, RN aware of current status. Tone RUE  RUE Tone: Normotonic  Tone LUE  LUE Tone: Normotonic  Coordination  Movements Are Fluid And Coordinated: Yes     Bed mobility  Supine to Sit: Independent  Sit to Supine: Independent  Scooting: Independent  Transfers  Stand Step Transfers: Contact guard assistance  Sit to stand: Stand by assistance  Stand to sit: Stand by assistance     Cognition  Overall Cognitive Status: WFL        Sensation  Overall Sensation Status: Impaired(pt reports occasional numbness in BU/LEs)        LUE AROM (degrees)  LUE AROM : WFL  Left Hand AROM (degrees)  Left Hand AROM: WFL  RUE AROM (degrees)  RUE AROM : WFL  Right Hand AROM (degrees)  Right Hand AROM: WFL  LUE Strength  Gross LUE Strength: WFL  L Hand General: 4+/5  RUE Strength  Gross RUE Strength: WFL  R Hand General: 4+/5      Plan   Plan  Times per week: 1-2 sessions  Specific instructions for Next Treatment: Education on EC techniques for use during functional activities.   Current Treatment Recommendations: Safety Education & Training, Patient/Caregiver Education & Training, Self-Care / ADL, Functional Mobility Training, Endurance Training    AM-PAC Score   AM-PAC Inpatient Daily Activity Raw Score: 21 (06/18/19 1344)  AM-PAC Inpatient ADL T-Scale Score : 44.27 (06/18/19 1344)  ADL Inpatient CMS 0-100% Score: 32.79 (06/18/19 1344)  ADL Inpatient CMS G-Code Modifier : CJ (06/18/19 1344)    Goals  Short term goals  Time Frame for Short term goals: By d/c, pt will:  Short term goal 1: demo (I) in UB/LB ADLs/functional activities. Short term goal 2: demo 15+ minutes dynamic standing tolerance during functional activities. Short term goal 3: demo (I) with functional mobility/transfers using LRD prn. Short term goal 4: demo understanding of 1-2 EC techniques for (I) use during functional activities. Patient Goals   Patient goals : To go home.        Therapy Time   Individual Concurrent Group Co-treatment   Time In 3233         Time Out 1035         Minutes 17         Timed Code Treatment Minutes: [address] Skylar Gloria S/OT

## 2019-06-18 NOTE — PROGRESS NOTES
Smoking Cessation - topics covered   []  Health Risks  []  Benefits of Quitting   []  Smoking Cessation  []  Patient has no history of tobacco use  []  Patient is former smoker. []  No need for tobacco cessation education. []  Booklet given  []  Patient verbalizes understanding. []  Patient denies need for tobacco cessation education. []  Unable to meet with patient today. Will follow up as able. Writer met with patient on previous admission. Patient recalled information presented then. She states she has utilized suggestions made and has gone from smoking 1 pack per day down to .5 packs per day now. She states it is her intention to continue to quit completely.   TOLU ORTEGA  1:19 PM

## 2019-06-18 NOTE — ANESTHESIA POSTPROCEDURE EVALUATION
Department of Anesthesiology  Postprocedure Note    Patient: Allison Coppola  MRN: [id number]  [place]: 1975  Date of evaluation: 6/17/2019  Time:  8:13 PM     Procedure Summary     Date:  06/17/19 Room / Location:  [facility] OR  / [facility] OR    Anesthesia Start:  2221 Anesthesia Stop:  8828    Procedure:  DIRECT LARYNGOSCOPY MICRO WITH EXCISION OF BILATERAL VOCAL CORD POLYPS (Bilateral ) Diagnosis:  (VOCAL CORD POLYP)    Surgeon:  Oanh Tyson MD Responsible Provider:  Kesha Dill MD    Anesthesia Type:  general ASA Status:  3          Anesthesia Type: general    Eufemia Phase I: Eufemia Score: 10    Eufemia Phase II:      Last vitals: Reviewed and per EMR flowsheets.      POST-OP ANESTHESIA NOTE       /67   Pulse 58   Temp 98.6 °F (37 °C)   Resp 18   Ht 5' 3\" (1.6 m)   Wt 204 lb 12.9 oz (92.9 kg)   SpO2 98%   BMI 36.28 kg/m²    Pain Assessment: FLACC  Pain Level: 10         Anesthesia Post Evaluation    Patient location during evaluation: PACU  Patient participation: complete - patient participated  Level of consciousness: awake  Pain score: 2  Airway patency: patent  Nausea & Vomiting: no vomiting and no nausea  Complications: no  Cardiovascular status: hemodynamically stable  Respiratory status: acceptable  Hydration status: stable

## 2019-06-18 NOTE — DISCHARGE INSTR - COC
Continuity of Care Form    Patient Name: Ole Phan Call   :  1975  MRN:  [id number]    Admit date:  6/15/2019  Discharge date: 19    Code Status Order: Full Code   Advance Directives:   Advance Care Flowsheet Documentation     Date/Time Healthcare Directive Type of Healthcare Directive Copy in [address] Agent's Name Healthcare Agent's Phone Number    [phone]  No, patient does not have an advance directive for healthcare treatment -- -- -- -- --    06/15/19 2114  No, patient does not have an advance directive for healthcare treatment -- -- -- -- --          Admitting Physician:  Eddy Lesches, MD  PCP: Cathie Cortés MD    Discharging Nurse: Katalina Mcdonough. [address] Unit/Room#: 0329/0329-02  Discharging Unit Phone Number: [phone]    Emergency Contact:   Extended Emergency Contact Information  Primary Emergency Contact: Sunny Bergman Phone: [phone]  Relation: Rojaved Villarreal Parent  Secondary Emergency Contact: [address] [place] [address] Phone: [phone]  Relation: Child    Past Surgical History:  Past Surgical History:   Procedure Laterality Date    LARYNGOSCOPY  2019    EXCISION OF BILATERAL VOCAL CORD POLYPS     LARYNGOSCOPY Bilateral 2019    DIRECT LARYNGOSCOPY MICRO WITH EXCISION OF BILATERAL VOCAL CORD POLYPS performed by Daniel Baig MD at [address] [place] [address], [facility] N/A 3/17/2017    COMPLEX LACERATION REPAIR WITH DEBRIDEMENT performed by Kevin Zayas DDS at [place] [address]       Immunization History:   Immunization History   Administered Date(s) Administered    Pneumococcal Polysaccharide (Ecbghcpyf91) 2019    Tdap (Boostrix, Adacel) 2017       Active Problems:  Patient Active Problem List   Diagnosis Code    Dog bite of face S01.85XA, W54. 0XXA    Acute alcohol intoxication ([facility] [address] F10.929    Dog bite of finger S61.259A, W54. 0XXA    COPD exacerbation ([address] J44.1    Obesity E66.9    Essential hypertension I10    Alcohol abuse F10.10    Smoker F17.200    Non-compliance Z91.19    Cigarette nicotine dependence  F17.210    Lactic acidosis E87.2    Bronchitis J40    Inspiratory stridor R06.1    Laryngeal polyp J38.1       Isolation/Infection:   Isolation          No Isolation            Nurse Assessment:  Last Vital Signs: BP (!) 165/93   Pulse 77   Temp 98.6 °F (37 °C)   Resp 14   Ht 5' 3\" (1.6 m)   Wt 204 lb 12.9 oz (92.9 kg)   SpO2 99%   BMI 36.28 kg/m²     Last documented pain score (0-10 scale): Pain Level: 9  Last Weight:   Wt Readings from Last 1 Encounters:   06/16/19 204 lb 12.9 oz (92.9 kg)     Mental Status:  oriented and alert    IV Access:  - None    Nursing Mobility/ADLs:  Walking   Independent  Transfer  Independent  Bathing  Independent  Dressing  Independent  Toileting  Independent  Feeding  Independent  Med Admin  Independent  Med Delivery   whole    Wound Care Documentation and Therapy:        Elimination:  Continence:   · Bowel: Yes  · Bladder: Yes  Urinary Catheter: None   Colostomy/Ileostomy/Ileal Conduit: No       Date of Last BM: 6-18-19    Intake/Output Summary (Last 24 hours) at 6/18/2019 1703  Last data filed at 6/18/2019 3466  Gross per 24 hour   Intake --   Output 2050 ml   Net -2050 ml     I/O last 3 completed shifts:  In: -   Out: 2050 [Urine:2050]    Safety Concerns:     None    Impairments/Disabilities:      None    Nutrition Therapy:  Current Nutrition Therapy:   - Oral Diet:  General    Routes of Feeding: Oral  Liquids: No Restrictions  Daily Fluid Restriction: no  Last Modified Barium Swallow with Video (Video Swallowing Test): not done    Treatments at the Time of Hospital Discharge:   Respiratory Treatments: Albuterol inhaler/Symbicort as scheduled  Oxygen Therapy:  is not on home oxygen therapy.   Ventilator:    - No ventilator support    Rehab Therapies: none  Weight Bearing Status/Restrictions: No weight bearing restirctions  Other Medical Equipment: none    Patient's personal belongings (please select all that are sent with patient):  clothes, cell phone    RN SIGNATURE:  Electronically signed by Yessenia Pino RN on 6/18/19 at 5:13 PM    CASE MANAGEMENT/SOCIAL WORK SECTION    Inpatient Status Date: ***    Readmission Risk Assessment Score:  Readmission Risk              Risk of Unplanned Readmission:        15           Discharging to Facility/ Agency   · Name: [address] [place] [address]        Phone: [phone]       Fax: [phone]        ·     Dialysis Facility (if applicable)   · Name:  · Address:  · Dialysis Schedule:  · Phone:  · Fax:    / signature: Electronically signed by Bhavani Botello RN on 6/18/19 at 5:08 PM    PHYSICIAN SECTION    Prognosis: Good    Condition at Discharge: Stable    Rehab Potential (if transferring to Rehab): Good    Recommended Labs or Other Treatments After Discharge: F/U with ENT in 3 weeks. Physician Certification: I certify the above information and transfer of Silvia Coppola  is necessary for the continuing treatment of the diagnosis listed and that she requires Home Care for greater 30 days.      Update Admission H&P: No change in H&P    PHYSICIAN SIGNATURE:  Electronically signed by Marvell Krabbe, MD , on 6/18/19 at 5:21 PM

## 2019-06-27 NOTE — DISCHARGE SUMMARY
[address]   Department of Internal Medicine - Staff Internal Medicine Service    INPATIENT DISCHARGE SUMMARY      PATIENT IDENTIFICATION:  NAME: Kayleen Klein Call : 1975 MRN: [id number]  ACCT: [de-identified]     [facility] Date: 6/15/2019  Discharge date: 2019  5:20 AM     Attending Provider: Delia att. providers found                                     Dictating Provider: Sujey Zurita MD  ______________________________________________________________________________    REASON FOR HOSPITALIZATION:     Principal Problem:    Inspiratory stridor  Active Problems:    Acute alcohol intoxication ([address]    Essential hypertension    Cigarette nicotine dependence     Laryngeal polyp  Resolved Problems:    * No resolved hospital problems. *        HOSPITAL COURSE AND TREATMENT:  The patient is a pleasant 53 y.o. female with significant past medical history of COPD, active smoker, essential hypertension, alcohol abuse to the ED by EMS due to complaints of dyspnea or stridor from past 3 days. Patient was found to be dyspneic at home, tachypneic on examination with inspiratory stridor, patient was given racemic epinephrine infusion and Solu-Medrol for possible  drug reaction. Patient denies any wheezing, cough, fever, chest pain, fever. ENT was consulted from the ED for evaluation of stridor, a flexible nasal laryngoscope was done in the ED which showed possible polyps noted on the vocal cord.     She denies any dysphagia states she had breakfast in the morning.     Patient states she drinks around 5-6 beers in a day, her last drink was yesterday.   Patient states she goes into withdrawal if she does not drink alcohol.     Is afebrile, heart rate in 80s, blood pressure with systolic in 627M and diastolic in 744N, saturating at 100% on  nasal cannula, 133, urine drug screen positive for benzodiazepines and cannabis.     ENT was consulted from the ED, advised for Decadron 10 mg IV every 8 hours and clindamycin. Patient found to have B/L vocal cord polyps causing stridor, initially given IV steroids, IV  clindamycin initially, ENT had done laryngoscopy, excision of polyps was done, sent for pathology. Her stridor improved, discharged on amoxicillin for 1 week and discharged in a stable condition [facility]. Consults: ENT    Procedures:  Laryngoscopy/microlaryngoscopy, excision of B/L vocal cord polyps.      Any Hospital Acquired Infections: none      PATIENT'S DISCHARGE CONDITION:      stable      DISCHARGE MEDICATIONS    Discharge Medication List as of 6/18/2019  5:01 PM      START taking these medications    Details   losartan (COZAAR) 25 MG tablet Take 1 tablet by mouth daily, Disp-30 tablet, R-3Normal      Multiple Vitamin (MULTIVITAMIN) tablet Take 1 tablet by mouth daily, Disp-30 tablet, R-0Normal      amoxicillin (AMOXIL) 500 MG capsule Take 1 capsule by mouth every 8 hours for 10 days, Disp-30 capsule, R-0Normal      vitamin B-1 100 MG tablet Take 1 tablet by mouth daily, Disp-30 tablet, R-3Normal         CONTINUE these medications which have CHANGED    Details   albuterol (PROVENTIL) (5 MG/ML) 0.5% nebulizer solution Take 0.5 mLs by nebulization 4 times daily as needed for Wheezing, Disp-120 each, R-3Normal         CONTINUE these medications which have NOT CHANGED    Details   albuterol sulfate HFA (PROVENTIL HFA) 108 (90 Base) MCG/ACT inhaler Inhale 1-2 puffs into the lungs every 4 hours as needed for Wheezing, Disp-1 Inhaler, R-0Normal      amLODIPine (NORVASC) 10 MG tablet Take 1 tablet by mouth daily, Disp-90 tablet, R-0Normal      !! Nebulizers (COMPRESSOR/NEBULIZER) MISC Disp-1 each, R-3, PrintUse as directed      !! Nebulizers (COMPRESSOR/NEBULIZER) MISC Disp-1 each, R-3, PrintUse as needed for wheezing every 6 hrs      budesonide-formoterol (SYMBICORT) 160-4.5 MCG/ACT AERO Inhale 2 puffs into the lungs 2 times daily, Disp-3 Inhaler, R-1Normal guaiFENesin (MUCINEX) 600 MG extended release tablet Take 1 tablet by mouth 2 times daily, Disp-10 tablet, R-0Normal      fluticasone (FLONASE) 50 MCG/ACT nasal spray 2 sprays by Each Nare route daily, Disp-1 Bottle, R-0Normal      nicotine (NICODERM CQ) 21 MG/24HR Place 1 patch onto the skin daily, Disp-30 patch, R-3Normal       !! - Potential duplicate medications found. Please discuss with provider. DISCHARGE INSTRUCTIONS:     Activity: activity as tolerated    Diet: regular diet    Disposition: home      FOLLOW UP INSTRUCTIONS:    Labs: none    Imaging: none    Post hospital office visit:  F/U Dr. DESAI in 3 weeks. ATTESTATION:    I have discussed the case, including pertinent history and exam findings with the residents. I have seen and examined the patient and the key elements of the encounter have been performed by me. I have reviewed the laboratory data, other diagnostic studies and discussed them with the residents. I have updated the medical record where necessary. I agree with the assessment, plan and orders as documented by the resident. I agree with the summary created for the discharge summary.      Mahad Stovall MD.

## 2019-07-17 ENCOUNTER — OFFICE VISIT (OUTPATIENT)
Dept: INTERNAL MEDICINE | Age: 44
End: 2019-07-17
Payer: OTHER GOVERNMENT

## 2019-07-17 VITALS
DIASTOLIC BLOOD PRESSURE: 82 MMHG | HEIGHT: 63 IN | BODY MASS INDEX: 37.03 KG/M2 | SYSTOLIC BLOOD PRESSURE: 128 MMHG | HEART RATE: 65 BPM | WEIGHT: 209 LBS

## 2019-07-17 DIAGNOSIS — J44.9 CHRONIC OBSTRUCTIVE PULMONARY DISEASE, UNSPECIFIED COPD TYPE (HCC): ICD-10-CM

## 2019-07-17 DIAGNOSIS — I10 ESSENTIAL HYPERTENSION: ICD-10-CM

## 2019-07-17 DIAGNOSIS — N39.41 URGE INCONTINENCE OF URINE: ICD-10-CM

## 2019-07-17 DIAGNOSIS — J38.1 VOCAL CORD POLYPS: Primary | ICD-10-CM

## 2019-07-17 DIAGNOSIS — G56.03 BILATERAL CARPAL TUNNEL SYNDROME: ICD-10-CM

## 2019-07-17 DIAGNOSIS — F17.200 SMOKER: ICD-10-CM

## 2019-07-17 DIAGNOSIS — R19.7 DIARRHEA, UNSPECIFIED TYPE: ICD-10-CM

## 2019-07-17 DIAGNOSIS — R52 GENERALIZED PAIN: ICD-10-CM

## 2019-07-17 PROCEDURE — 1111F DSCHRG MED/CURRENT MED MERGE: CPT | Performed by: STUDENT IN AN ORGANIZED HEALTH CARE EDUCATION/TRAINING PROGRAM

## 2019-07-17 PROCEDURE — 3023F SPIROM DOC REV: CPT | Performed by: STUDENT IN AN ORGANIZED HEALTH CARE EDUCATION/TRAINING PROGRAM

## 2019-07-17 PROCEDURE — 99214 OFFICE O/P EST MOD 30 MIN: CPT | Performed by: STUDENT IN AN ORGANIZED HEALTH CARE EDUCATION/TRAINING PROGRAM

## 2019-07-17 PROCEDURE — G8926 SPIRO NO PERF OR DOC: HCPCS | Performed by: STUDENT IN AN ORGANIZED HEALTH CARE EDUCATION/TRAINING PROGRAM

## 2019-07-17 PROCEDURE — 4004F PT TOBACCO SCREEN RCVD TLK: CPT | Performed by: STUDENT IN AN ORGANIZED HEALTH CARE EDUCATION/TRAINING PROGRAM

## 2019-07-17 PROCEDURE — G8427 DOCREV CUR MEDS BY ELIG CLIN: HCPCS | Performed by: STUDENT IN AN ORGANIZED HEALTH CARE EDUCATION/TRAINING PROGRAM

## 2019-07-17 PROCEDURE — 99211 OFF/OP EST MAY X REQ PHY/QHP: CPT | Performed by: INTERNAL MEDICINE

## 2019-07-17 PROCEDURE — G8417 CALC BMI ABV UP PARAM F/U: HCPCS | Performed by: STUDENT IN AN ORGANIZED HEALTH CARE EDUCATION/TRAINING PROGRAM

## 2019-07-17 RX ORDER — DIAPER,BRIEF,ADULT, DISPOSABLE
EACH MISCELLANEOUS
Qty: 100 EACH | Refills: 3 | Status: ON HOLD | OUTPATIENT
Start: 2019-07-17 | End: 2019-11-26 | Stop reason: HOSPADM

## 2019-07-17 RX ORDER — MULTIVITAMIN WITH FOLIC ACID 400 MCG
1 TABLET ORAL DAILY
Qty: 30 TABLET | Refills: 0 | Status: SHIPPED | OUTPATIENT
Start: 2019-07-17 | End: 2021-10-09

## 2019-07-17 RX ORDER — ALBUTEROL SULFATE 90 UG/1
1-2 AEROSOL, METERED RESPIRATORY (INHALATION) EVERY 4 HOURS PRN
Qty: 1 INHALER | Refills: 0 | Status: SHIPPED | OUTPATIENT
Start: 2019-07-17 | End: 2021-10-09

## 2019-07-17 NOTE — PROGRESS NOTES
instructions    Jay Chad  PGY 3, Internal Medicine   [address]  7/17/2019, 10:50 AM    This note is created with the assistance of a speech-recognition program. While intending to generate a document that actually reflects the content of the visit, the document can still have some mistakes which may not have been identified and corrected by editing    Attending Physician Statement [facility])    Patient complains of diarrhea and as patient is alcoholic will do CT abdomen to rule out ch.pancreatitis and will order lipase and also as she is c/o myalgia and arthralgia will order labs to check for different arthriic problems and patient mentions that she does not know her family history as she does not get along with them  Review in 4 weeks    I have discussed the care of Jaylene Coppola, including pertinent history and exam findings, with the resident. I have seen and examined the patient and the key elements of all parts of the encounter have been performed by me. I agree with the assessment, plan and orders as documented by the resident. Anna Marie Paredes M.D.   Faculty, Internal Medicine Residency Program  Shanique  7/17/2019, 11:26 AM

## 2019-07-17 NOTE — PATIENT INSTRUCTIONS
Labs given to patient, they will have them done before their next visit. Follow-up appointment scheduled for 9/18/19, AVS mailed to patient. Printed script for Wrist Splint signed and mailed to pt    Order for CT faxed to [address] [place] [address] they will all pt for appt. Please call [phone] in not heard within 2 weeks.      MERARY RODRIGUEZ (AMT)

## 2019-07-18 ENCOUNTER — TELEPHONE (OUTPATIENT)
Dept: INTERNAL MEDICINE | Age: 44
End: 2019-07-18

## 2019-07-18 DIAGNOSIS — R19.7 DIARRHEA, UNSPECIFIED TYPE: Primary | ICD-10-CM

## 2019-07-31 ENCOUNTER — TELEPHONE (OUTPATIENT)
Dept: INTERNAL MEDICINE | Age: 44
End: 2019-07-31

## 2019-07-31 NOTE — TELEPHONE ENCOUNTER
Patient is scheduled for a CT of the Abdomen without contrast (see 7/18/19 encounter) this afternoon.     Deandra from CT called to verify order is without contrast.  She was informed that CT is without contrast.  Verified order with Dr. Bladimir Michael and she gave verbal order to change order to CT of abdomen and pelvis without contrast.      Message sent to Dr. Bladimir Michael to order test

## 2019-09-11 ENCOUNTER — APPOINTMENT (OUTPATIENT)
Dept: GENERAL RADIOLOGY | Age: 44
End: 2019-09-11
Payer: OTHER GOVERNMENT

## 2019-09-11 ENCOUNTER — HOSPITAL ENCOUNTER (EMERGENCY)
Age: 44
Discharge: HOME | End: 2019-09-11
Attending: EMERGENCY MEDICINE
Payer: OTHER GOVERNMENT

## 2019-09-11 VITALS
DIASTOLIC BLOOD PRESSURE: 100 MMHG | HEIGHT: 65 IN | HEART RATE: 80 BPM | SYSTOLIC BLOOD PRESSURE: 157 MMHG | TEMPERATURE: 96.8 F | BODY MASS INDEX: 33.32 KG/M2 | OXYGEN SATURATION: 96 % | RESPIRATION RATE: 18 BRPM | WEIGHT: 200 LBS

## 2019-09-11 DIAGNOSIS — J40 BRONCHITIS: Primary | ICD-10-CM

## 2019-09-11 LAB
-: ABNORMAL
ABSOLUTE EOS #: 0.17 K/UL (ref 0–0.44)
ABSOLUTE IMMATURE GRANULOCYTE: 0.03 K/UL (ref 0–0.3)
ABSOLUTE LYMPH #: 1.77 K/UL (ref 1.1–3.7)
ABSOLUTE MONO #: 0.62 K/UL (ref 0.1–1.2)
ALBUMIN SERPL-MCNC: 4.2 G/DL (ref 3.5–5.2)
ALBUMIN/GLOBULIN RATIO: 1.3 (ref 1–2.5)
ALP BLD-CCNC: 83 U/L (ref 35–104)
ALT SERPL-CCNC: 85 U/L (ref 5–33)
AMORPHOUS: ABNORMAL
ANION GAP SERPL CALCULATED.3IONS-SCNC: 18 MMOL/L (ref 9–17)
AST SERPL-CCNC: 94 U/L
BACTERIA: ABNORMAL
BASOPHILS # BLD: 0 % (ref 0–2)
BASOPHILS ABSOLUTE: <0.03 K/UL (ref 0–0.2)
BILIRUB SERPL-MCNC: 0.53 MG/DL (ref 0.3–1.2)
BILIRUBIN URINE: NEGATIVE
BUN BLDV-MCNC: 10 MG/DL (ref 6–20)
BUN/CREAT BLD: ABNORMAL (ref 9–20)
CALCIUM SERPL-MCNC: 9.5 MG/DL (ref 8.6–10.4)
CASTS UA: ABNORMAL /LPF (ref 0–2)
CHLORIDE BLD-SCNC: 96 MMOL/L (ref 98–107)
CO2: 22 MMOL/L (ref 20–31)
COLOR: ABNORMAL
COMMENT UA: ABNORMAL
CREAT SERPL-MCNC: 0.73 MG/DL (ref 0.5–0.9)
CRYSTALS, UA: ABNORMAL /HPF
DIFFERENTIAL TYPE: ABNORMAL
EOSINOPHILS RELATIVE PERCENT: 2 % (ref 1–4)
EPITHELIAL CELLS UA: ABNORMAL /HPF (ref 0–5)
GFR AFRICAN AMERICAN: >60 ML/MIN
GFR NON-AFRICAN AMERICAN: >60 ML/MIN
GFR SERPL CREATININE-BSD FRML MDRD: ABNORMAL ML/MIN/{1.73_M2}
GFR SERPL CREATININE-BSD FRML MDRD: ABNORMAL ML/MIN/{1.73_M2}
GLUCOSE BLD-MCNC: 87 MG/DL (ref 70–99)
GLUCOSE URINE: NEGATIVE
HCT VFR BLD CALC: 45.3 % (ref 36.3–47.1)
HEMOGLOBIN: 15.1 G/DL (ref 11.9–15.1)
IMMATURE GRANULOCYTES: 0 %
KETONES, URINE: ABNORMAL
LEUKOCYTE ESTERASE, URINE: NEGATIVE
LYMPHOCYTES # BLD: 24 % (ref 24–43)
MCH RBC QN AUTO: 33.6 PG (ref 25.2–33.5)
MCHC RBC AUTO-ENTMCNC: 33.3 G/DL (ref 28.4–34.8)
MCV RBC AUTO: 100.9 FL (ref 82.6–102.9)
MONOCYTES # BLD: 8 % (ref 3–12)
MUCUS: ABNORMAL
NITRITE, URINE: NEGATIVE
NRBC AUTOMATED: 0 PER 100 WBC
OTHER OBSERVATIONS UA: ABNORMAL
PDW BLD-RTO: 14.1 % (ref 11.8–14.4)
PH UA: 5.5 (ref 5–8)
PLATELET # BLD: 264 K/UL (ref 138–453)
PLATELET ESTIMATE: ABNORMAL
PMV BLD AUTO: 9.2 FL (ref 8.1–13.5)
POTASSIUM SERPL-SCNC: 4.3 MMOL/L (ref 3.7–5.3)
PROTEIN UA: ABNORMAL
RBC # BLD: 4.49 M/UL (ref 3.95–5.11)
RBC # BLD: ABNORMAL 10*6/UL
RBC UA: ABNORMAL /HPF (ref 0–2)
RENAL EPITHELIAL, UA: ABNORMAL /HPF
SEG NEUTROPHILS: 66 % (ref 36–65)
SEGMENTED NEUTROPHILS ABSOLUTE COUNT: 4.83 K/UL (ref 1.5–8.1)
SODIUM BLD-SCNC: 136 MMOL/L (ref 135–144)
SPECIFIC GRAVITY UA: 1.02 (ref 1–1.03)
TOTAL PROTEIN: 7.4 G/DL (ref 6.4–8.3)
TRICHOMONAS: ABNORMAL
TROPONIN INTERP: ABNORMAL
TROPONIN INTERP: ABNORMAL
TROPONIN T: ABNORMAL NG/ML
TROPONIN T: ABNORMAL NG/ML
TROPONIN, HIGH SENSITIVITY: 25 NG/L (ref 0–14)
TROPONIN, HIGH SENSITIVITY: 25 NG/L (ref 0–14)
TURBIDITY: ABNORMAL
URINE HGB: NEGATIVE
UROBILINOGEN, URINE: NORMAL
WBC # BLD: 7.4 K/UL (ref 3.5–11.3)
WBC # BLD: ABNORMAL 10*3/UL
WBC UA: ABNORMAL /HPF (ref 0–5)
YEAST: ABNORMAL

## 2019-09-11 PROCEDURE — 6370000000 HC RX 637 (ALT 250 FOR IP): Performed by: STUDENT IN AN ORGANIZED HEALTH CARE EDUCATION/TRAINING PROGRAM

## 2019-09-11 PROCEDURE — 71046 X-RAY EXAM CHEST 2 VIEWS: CPT

## 2019-09-11 PROCEDURE — 99285 EMERGENCY DEPT VISIT HI MDM: CPT

## 2019-09-11 PROCEDURE — 93005 ELECTROCARDIOGRAM TRACING: CPT | Performed by: STUDENT IN AN ORGANIZED HEALTH CARE EDUCATION/TRAINING PROGRAM

## 2019-09-11 PROCEDURE — 84484 ASSAY OF TROPONIN QUANT: CPT

## 2019-09-11 PROCEDURE — 87086 URINE CULTURE/COLONY COUNT: CPT

## 2019-09-11 PROCEDURE — 80053 COMPREHEN METABOLIC PANEL: CPT

## 2019-09-11 PROCEDURE — 85025 COMPLETE CBC W/AUTO DIFF WBC: CPT

## 2019-09-11 PROCEDURE — 81001 URINALYSIS AUTO W/SCOPE: CPT

## 2019-09-11 RX ORDER — ASPIRIN 81 MG/1
324 TABLET, CHEWABLE ORAL ONCE
Status: COMPLETED | OUTPATIENT
Start: 2019-09-11 | End: 2019-09-11

## 2019-09-11 RX ORDER — GUAIFENESIN 600 MG/1
600 TABLET, EXTENDED RELEASE ORAL 2 TIMES DAILY
Qty: 30 TABLET | Refills: 0 | Status: SHIPPED | OUTPATIENT
Start: 2019-09-11 | End: 2019-09-26

## 2019-09-11 RX ADMIN — ASPIRIN 81 MG 324 MG: 81 TABLET ORAL at 18:11

## 2019-09-11 ASSESSMENT — PAIN DESCRIPTION - PAIN TYPE: TYPE: CHRONIC PAIN;ACUTE PAIN

## 2019-09-11 ASSESSMENT — PAIN SCALES - GENERAL: PAINLEVEL_OUTOF10: 10

## 2019-09-11 ASSESSMENT — PAIN DESCRIPTION - LOCATION: LOCATION: ARM;CHEST

## 2019-09-11 ASSESSMENT — PAIN DESCRIPTION - ORIENTATION: ORIENTATION: RIGHT;LEFT

## 2019-09-11 ASSESSMENT — PAIN DESCRIPTION - DESCRIPTORS: DESCRIPTORS: CONSTANT

## 2019-09-11 ASSESSMENT — PAIN DESCRIPTION - FREQUENCY: FREQUENCY: CONTINUOUS

## 2019-09-11 NOTE — ED PROVIDER NOTES
[facility]  ED  Emergency Department Encounter  EmergencyMedicine Resident     Pt Shayy BRYANT Call  MRN: [id number]  [place] 1975  Date of evaluation: 9/11/19  PCP:  Carissa Vazquez MD    [address] [place] [address]       Chief Complaint   Patient presents with    Chest Pain     pt reported chronic chest pain with arm numbness. \"got worst today after lying down\". HISTORY OF PRESENT ILLNESS  (Location/Symptom, Timing/Onset, Context/Setting, Quality, Duration, Modifying Factors, Severity.)      Jenni Coppola is a 53 y.o. female with asthma and hypertension who smokes who presents with several days sharp chest pain with nausea, vomiting, nonproductive cough and diarrhea. Symptoms have that waxed and waned since onset. Chest pain radiates into the arm, patient is concerned for cardiovascular etiology. Chronic daily EtOH use, multiple drinks this morning prior to presentation. No cardiac history. No fever, chills, headache, vision changes. PAST MEDICAL / SURGICAL / SOCIAL / FAMILY HISTORY      has a past medical history of Asthma, Chronic kidney disease, COPD (chronic obstructive pulmonary disease) ([address] Hypertension, and Liver disease. has a past surgical history that includes pr excision of bone, lower jaw (N/A, 3/17/2017); laryngoscopy (06/17/2019); and laryngoscopy (Bilateral, 6/17/2019). Social History     Socioeconomic History    Marital status:       Spouse name: Not on file    Number of children: Not on file    Years of education: Not on file    Highest education level: Not on file   Occupational History    Not on file   Social Needs    Financial resource strain: Not on file    Food insecurity:     Worry: Not on file     Inability: Not on file    Transportation needs:     Medical: Not on file     Non-medical: Not on file   Tobacco Use    Smoking status: Current Every Day Smoker     Packs/day: 1.00     Years: 15.00     Pack years: 15.00     Types: kg/m²     Physical Exam   Constitutional: She is oriented to person, place, and time. Vital signs are normal. She appears well-developed and well-nourished. No distress. HENT:   Head: Normocephalic and atraumatic. Right Ear: External ear normal.   Left Ear: External ear normal.   Nose: Nose normal.   Mouth/Throat: Uvula is midline, oropharynx is clear and moist and mucous membranes are normal. No oropharyngeal exudate. Eyes: Pupils are equal, round, and reactive to light. Conjunctivae and EOM are normal. Right eye exhibits no discharge. Left eye exhibits no discharge. Bilateral periorbital erythema   Neck: Normal range of motion. Cardiovascular: Normal rate, regular rhythm and normal heart sounds. No murmur heard. Pulmonary/Chest: Effort normal. No stridor. No respiratory distress. She has no wheezes. She has no rales. She exhibits tenderness. Abdominal: Soft. Normal appearance. There is no tenderness. Musculoskeletal: Normal range of motion. She exhibits no deformity. Neurological: She is alert and oriented to person, place, and time. Skin: Skin is warm and dry. Capillary refill takes less than 2 seconds. She is not diaphoretic. Psychiatric: She has a normal mood and affect. Her behavior is normal.   Nursing note and vitals reviewed.       DIFFERENTIAL  DIAGNOSIS     PLAN (LABS / IMAGING / EKG):  Orders Placed This Encounter   Procedures    Urine Culture    XR CHEST STANDARD (2 VW)    CBC Auto Differential    Troponin    Urinalysis, reflex to microscopic    Comprehensive Metabolic Panel    Microscopic Urinalysis    Telemetry monitoring    Continuous Pulse Oximetry    EKG 12 Lead    EKG REPORT    Insert peripheral IV       MEDICATIONS ORDERED:  Orders Placed This Encounter   Medications    aspirin chewable tablet 324 mg    guaiFENesin (MUCINEX) 600 MG extended release tablet     Sig: Take 1 tablet by mouth 2 times daily for 15 days     Dispense:  30 tablet     Refill:  0 DDX: Chest Pain Diferentials    Emergent causes: ACS/NSTEMI/STEMI/angina, arrhythmia, trauma, aortic dissection, PE, Pneumonia, pneumothorax, esophageal rupture, tamponade, Cocaine use    Non-emergent causes: pneumonia, pericarditis, GERD, MSK, Endocarditis, anxiety      As a part of this differential, I evaluated for the presence of diaphoresis,  chest pain (including the QRST of the chest pain), tachypnea, BP in both arms, heart sounds, JVD, chest wall tenderness, abnormal lung sounds, and the presence of systemic and peripheral edema. I also evaluated the HEART Risk score and it is documented below.     HEART Risk Score for Chest Pain Patients      History and Physical Exam Suspicion Level   · Nausea/Vomiting   · Diaphoresis   · Radiation   · Related to Exertion  · Quality of Pain     EKG Interpretation  · Normal (0 pts)  · Non-Specific Repolarization Disturbance (1 pt)  · Significant ST-Depression (2 pts)     Age of Patient (in years)  · = 45 (0 pts)  · 46-64 (1 pt)  · = 65 (2 pts)    Risk Factors (number present)  · Hypercholesterolemia  · Hypertension  · Diabetes Mellitus  · Cigarette smoking  · Positive family history  · Obesity  · CAD  · Automatically get 2 points for - SLE, CKDz, HIV, Cocaine abuse     Troponin Levels  · = Normal Limit (0 pts)  · 1-3 Times Normal Limit (1 pt)  · > 3 Times Normal Limit (2 pts)      H&P ECG  Patient Age Risk Factors Troponins   0   Slight Normal   45   None   Normal level   1   Moderate Non-specific    46-64   1-2 risk factors   1-3 x Normal   2   High ST Changes   65 or older   3+ risk factors   3+ x Normal   Total 0 0 1 1 1     TOTAL: 3    Percent Risk for Major Adverse Cardiac Event (MACE)  0-3 pts indicates low risk for MACE   2.5% (DISCHARGE)   4-7 pts indicates moderate risk for MACE  20.3% (OBS)  8-10 pts indicates high risk for MACE   72.7% (EARLY INVASIVE TX)      DIAGNOSTIC RESULTS / EMERGENCY DEPARTMENT COURSE / MDM     LABS:  Results for orders placed or performed during the hospital encounter of 09/11/19   CBC Auto Differential   Result Value Ref Range    WBC 7.4 3.5 - 11.3 k/uL    RBC 4.49 3.95 - 5.11 m/uL    Hemoglobin 15.1 11.9 - 15.1 g/dL    Hematocrit 45.3 36.3 - 47.1 %    .9 82.6 - 102.9 fL    MCH 33.6 (H) 25.2 - 33.5 pg    MCHC 33.3 28.4 - 34.8 g/dL    RDW 14.1 11.8 - 14.4 %    Platelets 068 139 - 314 k/uL    MPV 9.2 8.1 - 13.5 fL    NRBC Automated 0.0 0.0 per 100 WBC    Differential Type NOT REPORTED     Seg Neutrophils 66 (H) 36 - 65 %    Lymphocytes 24 24 - 43 %    Monocytes 8 3 - 12 %    Eosinophils % 2 1 - 4 %    Basophils 0 0 - 2 %    Immature Granulocytes 0 0 %    Segs Absolute 4.83 1.50 - 8.10 k/uL    Absolute Lymph # 1.77 1.10 - 3.70 k/uL    Absolute Mono # 0.62 0.10 - 1.20 k/uL    Absolute Eos # 0.17 0.00 - 0.44 k/uL    Basophils Absolute <0.03 0.00 - 0.20 k/uL    Absolute Immature Granulocyte 0.03 0.00 - 0.30 k/uL    WBC Morphology NOT REPORTED     RBC Morphology NOT REPORTED     Platelet Estimate NOT REPORTED    Troponin   Result Value Ref Range    Troponin, High Sensitivity 25 (H) 0 - 14 ng/L    Troponin T NOT REPORTED <0.03 ng/mL    Troponin Interp NOT REPORTED    Troponin   Result Value Ref Range    Troponin, High Sensitivity 25 (H) 0 - 14 ng/L    Troponin T NOT REPORTED <0.03 ng/mL    Troponin Interp NOT REPORTED    Urinalysis, reflex to microscopic   Result Value Ref Range    Color, UA DARK YELLOW (A) YELLOW    Turbidity UA CLOUDY (A) CLEAR    Glucose, Ur NEGATIVE NEGATIVE    Bilirubin Urine NEGATIVE NEGATIVE    Ketones, Urine MODERATE (A) NEGATIVE    Specific Gravity, UA 1.024 1.005 - 1.030    Urine Hgb NEGATIVE NEGATIVE    pH, UA 5.5 5.0 - 8.0    Protein, UA 1+ (A) NEGATIVE    Urobilinogen, Urine Normal Normal    Nitrite, Urine NEGATIVE NEGATIVE    Leukocyte Esterase, Urine NEGATIVE NEGATIVE    Urinalysis Comments NOT REPORTED    Comprehensive Metabolic Panel   Result Value Ref Range    Glucose 87 70 - 99 mg/dL    BUN 10 6 - 20 MD  [address]  [phone]    Call in 1 day  Concerning this ED visit    [facility] ED  [facility]  [phone]  Go to   If symptoms worsen      DISCHARGE MEDICATIONS:  Discharge Medication List as of 9/11/2019  7:58 PM          Alie Christiansen MD  Emergency Medicine Resident    (Please note that portions of thisnote were completed with a voice recognition program.  Efforts were made to edit the dictations but occasionally words are mis-transcribed.)        Alie Christiansen MD  Resident  09/12/19 2497

## 2019-09-11 NOTE — ED NOTES
Pt to ed with c/o left side  Chest pain a couple hours ago when she tried to lay down. Pt states she has been vomiting for a week or so and has bloodshot eyes from it. Pt arrived ambulatory and in no acute distress. Pt alert and oriented, speaking clearly and in full sentences. Pt placed on monitor, ekg completed, iv established and lab work drawn. Call light placed within reach.       Chase Wheatley RN  09/11/19 2625

## 2019-09-11 NOTE — ED NOTES
Pt ambulated to restroom with steady gait to try and provide urine specimen.      Elvia Pineda RN  09/11/19 1800

## 2019-09-12 LAB
EKG ATRIAL RATE: 92 BPM
EKG P AXIS: 69 DEGREES
EKG P-R INTERVAL: 154 MS
EKG Q-T INTERVAL: 332 MS
EKG QRS DURATION: 74 MS
EKG QTC CALCULATION (BAZETT): 410 MS
EKG R AXIS: 56 DEGREES
EKG T AXIS: 60 DEGREES
EKG VENTRICULAR RATE: 92 BPM

## 2019-09-12 PROCEDURE — 93010 ELECTROCARDIOGRAM REPORT: CPT | Performed by: INTERNAL MEDICINE

## 2019-09-12 ASSESSMENT — ENCOUNTER SYMPTOMS
NAUSEA: 1
TROUBLE SWALLOWING: 0
VOMITING: 1
DIARRHEA: 1
SHORTNESS OF BREATH: 0
COUGH: 1
CONSTIPATION: 0
ABDOMINAL PAIN: 0
CHEST TIGHTNESS: 1

## 2019-09-13 LAB
CULTURE: NORMAL
Lab: NORMAL
SPECIMEN DESCRIPTION: NORMAL

## 2019-11-11 ENCOUNTER — TELEPHONE (OUTPATIENT)
Dept: INTERNAL MEDICINE | Age: 44
End: 2019-11-11

## 2019-11-22 ENCOUNTER — HOSPITAL ENCOUNTER (INPATIENT)
Age: 44
LOS: 3 days | Discharge: HOME | DRG: 881 | End: 2019-11-26
Attending: EMERGENCY MEDICINE | Admitting: PSYCHIATRY & NEUROLOGY
Payer: OTHER GOVERNMENT

## 2019-11-22 DIAGNOSIS — R45.851 SUICIDAL IDEATION: Primary | ICD-10-CM

## 2019-11-22 LAB
ETHANOL PERCENT: 0.25 %
ETHANOL: 252 MG/DL

## 2019-11-22 PROCEDURE — 84443 ASSAY THYROID STIM HORMONE: CPT

## 2019-11-22 PROCEDURE — 99285 EMERGENCY DEPT VISIT HI MDM: CPT

## 2019-11-22 PROCEDURE — 84439 ASSAY OF FREE THYROXINE: CPT

## 2019-11-22 PROCEDURE — 36415 COLL VENOUS BLD VENIPUNCTURE: CPT

## 2019-11-22 PROCEDURE — G0480 DRUG TEST DEF 1-7 CLASSES: HCPCS

## 2019-11-23 PROBLEM — F32.A DEPRESSIVE DISORDER: Status: ACTIVE | Noted: 2019-11-23

## 2019-11-23 LAB
THYROXINE, FREE: 0.97 NG/DL (ref 0.93–1.7)
TSH SERPL DL<=0.05 MIU/L-ACNC: 6.7 MIU/L (ref 0.3–5)

## 2019-11-23 PROCEDURE — 99223 1ST HOSP IP/OBS HIGH 75: CPT | Performed by: NURSE PRACTITIONER

## 2019-11-23 PROCEDURE — 6370000000 HC RX 637 (ALT 250 FOR IP): Performed by: NURSE PRACTITIONER

## 2019-11-23 PROCEDURE — 1240000000 HC EMOTIONAL WELLNESS R&B

## 2019-11-23 PROCEDURE — 6370000000 HC RX 637 (ALT 250 FOR IP): Performed by: EMERGENCY MEDICINE

## 2019-11-23 PROCEDURE — 94760 N-INVAS EAR/PLS OXIMETRY 1: CPT

## 2019-11-23 PROCEDURE — 94640 AIRWAY INHALATION TREATMENT: CPT

## 2019-11-23 PROCEDURE — 6370000000 HC RX 637 (ALT 250 FOR IP): Performed by: PHYSICIAN ASSISTANT

## 2019-11-23 RX ORDER — AMLODIPINE BESYLATE 10 MG/1
10 TABLET ORAL DAILY
Status: DISCONTINUED | OUTPATIENT
Start: 2019-11-23 | End: 2019-11-26 | Stop reason: HOSPADM

## 2019-11-23 RX ORDER — ONDANSETRON 4 MG/1
4 TABLET, ORALLY DISINTEGRATING ORAL ONCE
Status: COMPLETED | OUTPATIENT
Start: 2019-11-23 | End: 2019-11-23

## 2019-11-23 RX ORDER — ACETAMINOPHEN 325 MG/1
650 TABLET ORAL EVERY 4 HOURS PRN
Status: DISCONTINUED | OUTPATIENT
Start: 2019-11-23 | End: 2019-11-26 | Stop reason: HOSPADM

## 2019-11-23 RX ORDER — ZIPRASIDONE HYDROCHLORIDE 20 MG/1
20 CAPSULE ORAL EVERY 4 HOURS PRN
Status: DISCONTINUED | OUTPATIENT
Start: 2019-11-23 | End: 2019-11-26 | Stop reason: HOSPADM

## 2019-11-23 RX ORDER — FOLIC ACID 1 MG/1
1 TABLET ORAL DAILY
Status: DISCONTINUED | OUTPATIENT
Start: 2019-11-23 | End: 2019-11-26 | Stop reason: HOSPADM

## 2019-11-23 RX ORDER — BENZTROPINE MESYLATE 1 MG/ML
2 INJECTION INTRAMUSCULAR; INTRAVENOUS 2 TIMES DAILY PRN
Status: DISCONTINUED | OUTPATIENT
Start: 2019-11-23 | End: 2019-11-26 | Stop reason: HOSPADM

## 2019-11-23 RX ORDER — THIAMINE MONONITRATE (VIT B1) 100 MG
100 TABLET ORAL DAILY
Status: DISCONTINUED | OUTPATIENT
Start: 2019-11-23 | End: 2019-11-26 | Stop reason: HOSPADM

## 2019-11-23 RX ORDER — LOSARTAN POTASSIUM 50 MG/1
25 TABLET ORAL DAILY
Status: DISCONTINUED | OUTPATIENT
Start: 2019-11-23 | End: 2019-11-26 | Stop reason: HOSPADM

## 2019-11-23 RX ORDER — M-VIT,TX,IRON,MINS/CALC/FOLIC 27MG-0.4MG
1 TABLET ORAL DAILY
Status: DISCONTINUED | OUTPATIENT
Start: 2019-11-23 | End: 2019-11-26 | Stop reason: HOSPADM

## 2019-11-23 RX ORDER — ALBUTEROL SULFATE 2.5 MG/3ML
2.5 SOLUTION RESPIRATORY (INHALATION) 4 TIMES DAILY PRN
Status: DISCONTINUED | OUTPATIENT
Start: 2019-11-23 | End: 2019-11-26 | Stop reason: HOSPADM

## 2019-11-23 RX ORDER — QUETIAPINE FUMARATE 50 MG/1
50 TABLET, FILM COATED ORAL NIGHTLY
Status: DISCONTINUED | OUTPATIENT
Start: 2019-11-23 | End: 2019-11-23

## 2019-11-23 RX ORDER — IPRATROPIUM BROMIDE AND ALBUTEROL SULFATE 2.5; .5 MG/3ML; MG/3ML
1 SOLUTION RESPIRATORY (INHALATION) EVERY 4 HOURS PRN
Status: COMPLETED | OUTPATIENT
Start: 2019-11-23 | End: 2019-11-23

## 2019-11-23 RX ORDER — HYDROXYZINE 50 MG/1
50 TABLET, FILM COATED ORAL 3 TIMES DAILY PRN
Status: DISCONTINUED | OUTPATIENT
Start: 2019-11-23 | End: 2019-11-26 | Stop reason: HOSPADM

## 2019-11-23 RX ORDER — MAGNESIUM HYDROXIDE/ALUMINUM HYDROXICE/SIMETHICONE 120; 1200; 1200 MG/30ML; MG/30ML; MG/30ML
30 SUSPENSION ORAL EVERY 6 HOURS PRN
Status: DISCONTINUED | OUTPATIENT
Start: 2019-11-23 | End: 2019-11-26 | Stop reason: HOSPADM

## 2019-11-23 RX ORDER — ALBUTEROL SULFATE 90 UG/1
1 AEROSOL, METERED RESPIRATORY (INHALATION) EVERY 6 HOURS PRN
Status: DISCONTINUED | OUTPATIENT
Start: 2019-11-23 | End: 2019-11-26 | Stop reason: HOSPADM

## 2019-11-23 RX ORDER — TRAZODONE HYDROCHLORIDE 50 MG/1
50 TABLET ORAL NIGHTLY PRN
Status: DISCONTINUED | OUTPATIENT
Start: 2019-11-23 | End: 2019-11-26 | Stop reason: HOSPADM

## 2019-11-23 RX ADMIN — ALUMINUM HYDROXIDE, MAGNESIUM HYDROXIDE, AND SIMETHICONE 30 ML: 200; 200; 20 SUSPENSION ORAL at 16:57

## 2019-11-23 RX ADMIN — MULTIPLE VITAMINS W/ MINERALS TAB 1 TABLET: TAB at 11:19

## 2019-11-23 RX ADMIN — LOSARTAN POTASSIUM 25 MG: 50 TABLET, FILM COATED ORAL at 15:28

## 2019-11-23 RX ADMIN — MOMETASONE FUROATE AND FORMOTEROL FUMARATE DIHYDRATE 2 PUFF: 100; 5 AEROSOL RESPIRATORY (INHALATION) at 20:50

## 2019-11-23 RX ADMIN — FOLIC ACID 1 MG: 1 TABLET ORAL at 11:19

## 2019-11-23 RX ADMIN — AMLODIPINE BESYLATE 10 MG: 10 TABLET ORAL at 15:28

## 2019-11-23 RX ADMIN — THIAMINE HCL TAB 100 MG 100 MG: 100 TAB at 11:22

## 2019-11-23 RX ADMIN — ACETAMINOPHEN 650 MG: 325 TABLET, FILM COATED ORAL at 18:28

## 2019-11-23 RX ADMIN — ONDANSETRON 4 MG: 4 TABLET, ORALLY DISINTEGRATING ORAL at 03:48

## 2019-11-23 RX ADMIN — IPRATROPIUM BROMIDE AND ALBUTEROL SULFATE 1 AMPULE: .5; 3 SOLUTION RESPIRATORY (INHALATION) at 08:42

## 2019-11-23 RX ADMIN — HYDROXYZINE HYDROCHLORIDE 50 MG: 50 TABLET, FILM COATED ORAL at 20:50

## 2019-11-23 RX ADMIN — ACETAMINOPHEN 650 MG: 325 TABLET, FILM COATED ORAL at 11:19

## 2019-11-23 ASSESSMENT — PAIN DESCRIPTION - LOCATION
LOCATION: HEAD
LOCATION: HEAD
LOCATION: JAW
LOCATION: JAW
LOCATION_2: JAW
LOCATION_2: JAW
LOCATION: JAW
LOCATION_2: HEAD

## 2019-11-23 ASSESSMENT — PAIN SCALES - GENERAL
PAINLEVEL_OUTOF10: 0
PAINLEVEL_OUTOF10: 3
PAINLEVEL_OUTOF10: 5
PAINLEVEL_OUTOF10: 5
PAINLEVEL_OUTOF10: 3
PAINLEVEL_OUTOF10: 10

## 2019-11-23 ASSESSMENT — PAIN DESCRIPTION - DESCRIPTORS
DESCRIPTORS: THROBBING;TINGLING
DESCRIPTORS_2: ACHING
DESCRIPTORS_2: ACHING
DESCRIPTORS: THROBBING;TINGLING
DESCRIPTORS_2: ACHING
DESCRIPTORS: HEADACHE
DESCRIPTORS: HEADACHE

## 2019-11-23 ASSESSMENT — SLEEP AND FATIGUE QUESTIONNAIRES
DIFFICULTY STAYING ASLEEP: YES
DO YOU HAVE DIFFICULTY SLEEPING: YES
DO YOU USE A SLEEP AID: NO
AVERAGE NUMBER OF SLEEP HOURS: 3
DIFFICULTY ARISING: NO
SLEEP PATTERN: DIFFICULTY FALLING ASLEEP;DISTURBED/INTERRUPTED SLEEP;EARLY AWAKENING;INSOMNIA
DIFFICULTY FALLING ASLEEP: NO
RESTFUL SLEEP: NO

## 2019-11-23 ASSESSMENT — PAIN DESCRIPTION - ORIENTATION
ORIENTATION_2: LEFT
ORIENTATION: LEFT
ORIENTATION: LEFT

## 2019-11-23 ASSESSMENT — PAIN DESCRIPTION - INTENSITY
RATING_2: 10

## 2019-11-23 ASSESSMENT — PAIN DESCRIPTION - PAIN TYPE
TYPE: ACUTE PAIN

## 2019-11-23 ASSESSMENT — LIFESTYLE VARIABLES: HISTORY_ALCOHOL_USE: YES

## 2019-11-23 ASSESSMENT — PAIN DESCRIPTION - ONSET
ONSET: ON-GOING
ONSET: ON-GOING

## 2019-11-23 ASSESSMENT — PAIN - FUNCTIONAL ASSESSMENT: PAIN_FUNCTIONAL_ASSESSMENT: ACTIVITIES ARE NOT PREVENTED

## 2019-11-23 ASSESSMENT — PAIN DESCRIPTION - FREQUENCY
FREQUENCY: CONTINUOUS
FREQUENCY: CONTINUOUS

## 2019-11-23 ASSESSMENT — PAIN DESCRIPTION - PROGRESSION: CLINICAL_PROGRESSION: NOT CHANGED

## 2019-11-24 LAB
ABSOLUTE EOS #: 0.2 K/UL (ref 0–0.4)
ABSOLUTE IMMATURE GRANULOCYTE: ABNORMAL K/UL (ref 0–0.3)
ABSOLUTE LYMPH #: 1.4 K/UL (ref 1–4.8)
ABSOLUTE MONO #: 0.4 K/UL (ref 0.1–1.3)
ALBUMIN SERPL-MCNC: 4.3 G/DL (ref 3.5–5.2)
ALBUMIN/GLOBULIN RATIO: ABNORMAL (ref 1–2.5)
ALP BLD-CCNC: 70 U/L (ref 35–104)
ALT SERPL-CCNC: 25 U/L (ref 5–33)
ANION GAP SERPL CALCULATED.3IONS-SCNC: 13 MMOL/L (ref 9–17)
AST SERPL-CCNC: 29 U/L
BASOPHILS # BLD: 1 % (ref 0–2)
BASOPHILS ABSOLUTE: 0 K/UL (ref 0–0.2)
BILIRUB SERPL-MCNC: 0.65 MG/DL (ref 0.3–1.2)
BUN BLDV-MCNC: 17 MG/DL (ref 6–20)
BUN/CREAT BLD: ABNORMAL (ref 9–20)
CALCIUM SERPL-MCNC: 9 MG/DL (ref 8.6–10.4)
CHLORIDE BLD-SCNC: 96 MMOL/L (ref 98–107)
CO2: 27 MMOL/L (ref 20–31)
CREAT SERPL-MCNC: 0.73 MG/DL (ref 0.5–0.9)
DIFFERENTIAL TYPE: ABNORMAL
EOSINOPHILS RELATIVE PERCENT: 4 % (ref 0–4)
GFR AFRICAN AMERICAN: >60 ML/MIN
GFR NON-AFRICAN AMERICAN: >60 ML/MIN
GFR SERPL CREATININE-BSD FRML MDRD: ABNORMAL ML/MIN/{1.73_M2}
GFR SERPL CREATININE-BSD FRML MDRD: ABNORMAL ML/MIN/{1.73_M2}
GLUCOSE BLD-MCNC: 109 MG/DL (ref 70–99)
HCT VFR BLD CALC: 42.6 % (ref 36–46)
HEMOGLOBIN: 14.6 G/DL (ref 12–16)
IMMATURE GRANULOCYTES: ABNORMAL %
LYMPHOCYTES # BLD: 27 % (ref 24–44)
MCH RBC QN AUTO: 34.5 PG (ref 26–34)
MCHC RBC AUTO-ENTMCNC: 34.4 G/DL (ref 31–37)
MCV RBC AUTO: 100.4 FL (ref 80–100)
MONOCYTES # BLD: 7 % (ref 1–7)
NRBC AUTOMATED: ABNORMAL PER 100 WBC
PDW BLD-RTO: 14.7 % (ref 11.5–14.9)
PLATELET # BLD: 352 K/UL (ref 150–450)
PLATELET ESTIMATE: ABNORMAL
PMV BLD AUTO: 7.2 FL (ref 6–12)
POTASSIUM SERPL-SCNC: 4.5 MMOL/L (ref 3.7–5.3)
RBC # BLD: 4.24 M/UL (ref 4–5.2)
RBC # BLD: ABNORMAL 10*6/UL
SEG NEUTROPHILS: 61 % (ref 36–66)
SEGMENTED NEUTROPHILS ABSOLUTE COUNT: 3.2 K/UL (ref 1.3–9.1)
SODIUM BLD-SCNC: 136 MMOL/L (ref 135–144)
TOTAL PROTEIN: 7.1 G/DL (ref 6.4–8.3)
WBC # BLD: 5.2 K/UL (ref 3.5–11)
WBC # BLD: ABNORMAL 10*3/UL

## 2019-11-24 PROCEDURE — 80053 COMPREHEN METABOLIC PANEL: CPT

## 2019-11-24 PROCEDURE — 6370000000 HC RX 637 (ALT 250 FOR IP): Performed by: NURSE PRACTITIONER

## 2019-11-24 PROCEDURE — 85025 COMPLETE CBC W/AUTO DIFF WBC: CPT

## 2019-11-24 PROCEDURE — 1240000000 HC EMOTIONAL WELLNESS R&B

## 2019-11-24 PROCEDURE — 99232 SBSQ HOSP IP/OBS MODERATE 35: CPT | Performed by: NURSE PRACTITIONER

## 2019-11-24 PROCEDURE — 36415 COLL VENOUS BLD VENIPUNCTURE: CPT

## 2019-11-24 PROCEDURE — 83036 HEMOGLOBIN GLYCOSYLATED A1C: CPT

## 2019-11-24 RX ADMIN — MULTIPLE VITAMINS W/ MINERALS TAB 1 TABLET: TAB at 08:35

## 2019-11-24 RX ADMIN — HYDROXYZINE HYDROCHLORIDE 50 MG: 50 TABLET, FILM COATED ORAL at 21:22

## 2019-11-24 RX ADMIN — LOSARTAN POTASSIUM 25 MG: 50 TABLET, FILM COATED ORAL at 08:35

## 2019-11-24 RX ADMIN — ACETAMINOPHEN 650 MG: 325 TABLET, FILM COATED ORAL at 14:46

## 2019-11-24 RX ADMIN — THIAMINE HCL TAB 100 MG 100 MG: 100 TAB at 08:35

## 2019-11-24 RX ADMIN — MOMETASONE FUROATE AND FORMOTEROL FUMARATE DIHYDRATE 2 PUFF: 100; 5 AEROSOL RESPIRATORY (INHALATION) at 21:22

## 2019-11-24 RX ADMIN — AMLODIPINE BESYLATE 10 MG: 10 TABLET ORAL at 08:35

## 2019-11-24 RX ADMIN — MOMETASONE FUROATE AND FORMOTEROL FUMARATE DIHYDRATE 2 PUFF: 100; 5 AEROSOL RESPIRATORY (INHALATION) at 08:36

## 2019-11-24 RX ADMIN — FOLIC ACID 1 MG: 1 TABLET ORAL at 08:35

## 2019-11-24 RX ADMIN — ACETAMINOPHEN 650 MG: 325 TABLET, FILM COATED ORAL at 21:22

## 2019-11-24 ASSESSMENT — PAIN DESCRIPTION - PAIN TYPE: TYPE: ACUTE PAIN

## 2019-11-24 ASSESSMENT — PAIN SCALES - GENERAL
PAINLEVEL_OUTOF10: 3
PAINLEVEL_OUTOF10: 0
PAINLEVEL_OUTOF10: 8
PAINLEVEL_OUTOF10: 2

## 2019-11-24 ASSESSMENT — LIFESTYLE VARIABLES: HISTORY_ALCOHOL_USE: YES

## 2019-11-24 ASSESSMENT — PAIN DESCRIPTION - LOCATION: LOCATION: BACK

## 2019-11-25 LAB
ESTIMATED AVERAGE GLUCOSE: 105 MG/DL
HBA1C MFR BLD: 5.3 % (ref 4–6)

## 2019-11-25 PROCEDURE — 1240000000 HC EMOTIONAL WELLNESS R&B

## 2019-11-25 PROCEDURE — 99232 SBSQ HOSP IP/OBS MODERATE 35: CPT | Performed by: PSYCHIATRY & NEUROLOGY

## 2019-11-25 PROCEDURE — 6370000000 HC RX 637 (ALT 250 FOR IP): Performed by: NURSE PRACTITIONER

## 2019-11-25 RX ADMIN — MOMETASONE FUROATE AND FORMOTEROL FUMARATE DIHYDRATE 2 PUFF: 100; 5 AEROSOL RESPIRATORY (INHALATION) at 07:55

## 2019-11-25 RX ADMIN — AMLODIPINE BESYLATE 10 MG: 10 TABLET ORAL at 07:56

## 2019-11-25 RX ADMIN — LURASIDONE HYDROCHLORIDE 20 MG: 40 TABLET, FILM COATED ORAL at 18:31

## 2019-11-25 RX ADMIN — FOLIC ACID 1 MG: 1 TABLET ORAL at 07:56

## 2019-11-25 RX ADMIN — HYDROXYZINE HYDROCHLORIDE 50 MG: 50 TABLET, FILM COATED ORAL at 20:37

## 2019-11-25 RX ADMIN — MOMETASONE FUROATE AND FORMOTEROL FUMARATE DIHYDRATE 2 PUFF: 100; 5 AEROSOL RESPIRATORY (INHALATION) at 20:40

## 2019-11-25 RX ADMIN — THIAMINE HCL TAB 100 MG 100 MG: 100 TAB at 07:57

## 2019-11-25 RX ADMIN — MULTIPLE VITAMINS W/ MINERALS TAB 1 TABLET: TAB at 07:56

## 2019-11-26 VITALS
TEMPERATURE: 97.9 F | WEIGHT: 200 LBS | RESPIRATION RATE: 14 BRPM | HEIGHT: 63 IN | OXYGEN SATURATION: 100 % | DIASTOLIC BLOOD PRESSURE: 81 MMHG | SYSTOLIC BLOOD PRESSURE: 116 MMHG | BODY MASS INDEX: 35.44 KG/M2 | HEART RATE: 71 BPM

## 2019-11-26 PROCEDURE — 99238 HOSP IP/OBS DSCHRG MGMT 30/<: CPT | Performed by: PSYCHIATRY & NEUROLOGY

## 2019-11-26 PROCEDURE — 6370000000 HC RX 637 (ALT 250 FOR IP): Performed by: NURSE PRACTITIONER

## 2019-11-26 PROCEDURE — 5130000000 HC BRIDGE APPOINTMENT: Performed by: COUNSELOR

## 2019-11-26 RX ADMIN — LOSARTAN POTASSIUM 25 MG: 50 TABLET, FILM COATED ORAL at 07:48

## 2019-11-26 RX ADMIN — THIAMINE HCL TAB 100 MG 100 MG: 100 TAB at 07:47

## 2019-11-26 RX ADMIN — MOMETASONE FUROATE AND FORMOTEROL FUMARATE DIHYDRATE 2 PUFF: 100; 5 AEROSOL RESPIRATORY (INHALATION) at 07:49

## 2019-11-26 RX ADMIN — AMLODIPINE BESYLATE 10 MG: 10 TABLET ORAL at 07:47

## 2019-11-26 RX ADMIN — FOLIC ACID 1 MG: 1 TABLET ORAL at 07:47

## 2019-11-26 RX ADMIN — MULTIPLE VITAMINS W/ MINERALS TAB 1 TABLET: TAB at 07:47

## 2019-12-09 ENCOUNTER — TELEPHONE (OUTPATIENT)
Dept: INTERNAL MEDICINE | Age: 44
End: 2019-12-09

## 2019-12-16 ENCOUNTER — TELEPHONE (OUTPATIENT)
Dept: INTERNAL MEDICINE | Age: 44
End: 2019-12-16

## 2020-01-13 ENCOUNTER — TELEPHONE (OUTPATIENT)
Dept: INTERNAL MEDICINE | Age: 45
End: 2020-01-13

## 2020-01-13 NOTE — LETTER
[address] [address]  Phone: [phone]  Fax: [phone]    Jena Castillo MD        January 13, 2020    Ke[address] Comp Call  [address] [place] [address]      Dear Mar Kelley: We are sending this letter because your PCP ordered [facility] for you to have done at your last visit here and they have not yet been completed. If you do not have the order, please stop by the office to get a print out of the order. If you do not have a follow-up appointment scheduled, please contact our office to set up an appointment, or if you stop to get a printout of the order you can make an appointment at that time. If you have any questions or concerns, please don't hesitate to call.     Sincerely,        Jena Castillo MD

## 2020-01-24 ENCOUNTER — TELEPHONE (OUTPATIENT)
Dept: INTERNAL MEDICINE | Age: 45
End: 2020-01-24

## 2020-01-24 NOTE — LETTER
[address] [address]  Phone: [phone]  Fax: [phone]    Frankey Brick, MD        January 24, 2020    Reece Number Call  [address] [place] [address]      Dear Alix Cordero: We are sending this letter because your PCP ordered Mammogram for you to have done at your last visit here and they have not yet been completed. If you do not have the order, please stop by the office to get a print out of the order. If you do not have a follow-up appointment scheduled, please contact our office to set up an appointment, or if you stop to get a printout of the order you can make an appointment at that time. If you have any questions or concerns, please don't hesitate to call.     Sincerely,        Frankey Brick, MD

## 2020-01-24 NOTE — LETTER
[address] [address]  Phone: [phone]  Fax: [phone]    Mady Maradiaga MD        January 24, 2020    Johnna Xavier Call  [address] [place] [address]      Dear Joellen Cullen: We are sending this letter because your PCP ordered Mammogram for you to have done at your last visit here and they have not yet been completed. If you do not have the order, please stop by the office to get a print out of the order. If you do not have a follow-up appointment scheduled, please contact our office to set up an appointment, or if you stop to get a printout of the order you can make an appointment at that time. If you have any questions or concerns, please don't hesitate to call.     Sincerely,        Mady Maradiaga MD

## 2021-10-09 ENCOUNTER — HOSPITAL ENCOUNTER (EMERGENCY)
Age: 46
Discharge: HOME | End: 2021-10-09
Attending: EMERGENCY MEDICINE
Payer: OTHER GOVERNMENT

## 2021-10-09 VITALS
TEMPERATURE: 98.9 F | WEIGHT: 190 LBS | BODY MASS INDEX: 33.66 KG/M2 | OXYGEN SATURATION: 98 % | SYSTOLIC BLOOD PRESSURE: 165 MMHG | RESPIRATION RATE: 18 BRPM | HEART RATE: 87 BPM | HEIGHT: 63 IN | DIASTOLIC BLOOD PRESSURE: 109 MMHG

## 2021-10-09 DIAGNOSIS — K02.9 PAIN DUE TO DENTAL CARIES: Primary | ICD-10-CM

## 2021-10-09 DIAGNOSIS — K04.7 DENTAL ABSCESS: ICD-10-CM

## 2021-10-09 PROCEDURE — 6370000000 HC RX 637 (ALT 250 FOR IP): Performed by: STUDENT IN AN ORGANIZED HEALTH CARE EDUCATION/TRAINING PROGRAM

## 2021-10-09 PROCEDURE — 99284 EMERGENCY DEPT VISIT MOD MDM: CPT

## 2021-10-09 PROCEDURE — 2500000003 HC RX 250 WO HCPCS: Performed by: STUDENT IN AN ORGANIZED HEALTH CARE EDUCATION/TRAINING PROGRAM

## 2021-10-09 PROCEDURE — 41800 DRAINAGE OF GUM LESION: CPT

## 2021-10-09 RX ORDER — PENICILLIN V POTASSIUM 250 MG/1
500 TABLET ORAL ONCE
Status: COMPLETED | OUTPATIENT
Start: 2021-10-09 | End: 2021-10-09

## 2021-10-09 RX ORDER — IBUPROFEN 800 MG/1
800 TABLET ORAL EVERY 8 HOURS PRN
Qty: 21 TABLET | Refills: 0 | Status: SHIPPED | OUTPATIENT
Start: 2021-10-09

## 2021-10-09 RX ORDER — HYDROCODONE BITARTRATE AND ACETAMINOPHEN 5; 325 MG/1; MG/1
1 TABLET ORAL EVERY 8 HOURS PRN
Qty: 9 TABLET | Refills: 0 | Status: SHIPPED | OUTPATIENT
Start: 2021-10-09 | End: 2021-10-12

## 2021-10-09 RX ORDER — HYDROCODONE BITARTRATE AND ACETAMINOPHEN 5; 325 MG/1; MG/1
1 TABLET ORAL ONCE
Status: COMPLETED | OUTPATIENT
Start: 2021-10-09 | End: 2021-10-09

## 2021-10-09 RX ORDER — PENICILLIN V POTASSIUM 500 MG/1
500 TABLET ORAL 4 TIMES DAILY
Qty: 28 TABLET | Refills: 0 | Status: SHIPPED | OUTPATIENT
Start: 2021-10-09 | End: 2021-10-16

## 2021-10-09 RX ORDER — LIDOCAINE HYDROCHLORIDE 10 MG/ML
20 INJECTION, SOLUTION INFILTRATION; PERINEURAL ONCE
Status: COMPLETED | OUTPATIENT
Start: 2021-10-09 | End: 2021-10-09

## 2021-10-09 RX ORDER — IBUPROFEN 800 MG/1
800 TABLET ORAL ONCE
Status: COMPLETED | OUTPATIENT
Start: 2021-10-09 | End: 2021-10-09

## 2021-10-09 RX ADMIN — LIDOCAINE HYDROCHLORIDE 20 ML: 10 INJECTION, SOLUTION INFILTRATION; PERINEURAL at 18:52

## 2021-10-09 RX ADMIN — PENICILLIN V POTASSIUM 500 MG: 250 TABLET, FILM COATED ORAL at 18:52

## 2021-10-09 RX ADMIN — HYDROCODONE BITARTRATE AND ACETAMINOPHEN 1 TABLET: 5; 325 TABLET ORAL at 20:12

## 2021-10-09 RX ADMIN — IBUPROFEN 800 MG: 800 TABLET, FILM COATED ORAL at 18:52

## 2021-10-09 ASSESSMENT — ENCOUNTER SYMPTOMS
FACIAL SWELLING: 1
SHORTNESS OF BREATH: 0
RHINORRHEA: 0
BACK PAIN: 0
CONSTIPATION: 0
DIARRHEA: 0
COUGH: 0
SORE THROAT: 0
NAUSEA: 0
ABDOMINAL PAIN: 0
VOMITING: 0

## 2021-10-09 ASSESSMENT — PAIN DESCRIPTION - ORIENTATION: ORIENTATION: RIGHT

## 2021-10-09 ASSESSMENT — PAIN SCALES - GENERAL
PAINLEVEL_OUTOF10: 10

## 2021-10-09 ASSESSMENT — PAIN DESCRIPTION - LOCATION: LOCATION: FACE

## 2021-10-09 NOTE — ED TRIAGE NOTES
Patient to ed with c/o right facial pain and swelling and dental pain. Patient has multiple dental caries. Patient states she has not been to dentist in many years.

## 2021-10-09 NOTE — ED PROVIDER NOTES
[facility]  ED  Emergency Department Encounter  Emergency Medicine Resident     Pt Name: Lisa Coppola  MRN: [id number]  [place] 1975  Date of evaluation: 10/9/21  PCP:  Bettina Wagner MD    CHIEF COMPLAINT       Chief Complaint   Patient presents with    Dental Pain    Facial Swelling       HISTORY OFPRESENT ILLNESS  (Location/Symptom, Timing/Onset, Context/Setting, Quality, Duration, Modifying Factors,Severity.)      Lisa Us Call is a 48 y.o. female with past medical history of COPD and hypertension who presents with right-sided facial swelling that was first noticed this morning upon waking. Patient also reports pain to multiple teeth on her right lower jaw and the feeling as if she has an abscess to her most posterior lower right tooth. She denies fever, chills, nausea, vomiting. She is eating and drinking well. She is able to swallow and denies any drooling or liquids running out of her mouth. She denies any swelling to the tongue's, lips, throat. Patient has not seen a dentist for her symptoms and has not seen a dentist in several years. She states she has not taken any medications for her symptoms but did take 1 tab of amoxicillin from a friend earlier today. PAST MEDICAL / SURGICAL / SOCIAL / FAMILY HISTORY      has a past medical history of Asthma, Carpal tunnel syndrome, Chronic kidney disease, COPD (chronic obstructive pulmonary disease) ([address] Hypertension, and Liver disease. has a past surgical history that includes pr excision of bone, lower jaw (N/A, 3/17/2017); laryngoscopy (06/17/2019); and laryngoscopy (Bilateral, 6/17/2019). Social:  reports that she has been smoking cigarettes. She has a 7.50 pack-year smoking history. She has never used smokeless tobacco. She reports current alcohol use. She reports current drug use. Drug: Marijuana.     Family Hx:   Family History   Problem Relation Age of Onset    Cancer Mother     Cancer Sister     Colon Cancer Nephew         Allergies:  Tramadol    Home Medications:  Prior to Admission medications    Medication Sig Start Date End Date Taking? Authorizing Provider   HYDROcodone-acetaminophen (NORCO) 5-325 MG per tablet Take 1 tablet by mouth every 8 hours as needed for Pain for up to 3 days. Intended supply: 3 days. Take lowest dose possible to manage pain 10/9/21 10/12/21 Yes Fernie Noble DO   ibuprofen (IBU) 800 MG tablet Take 1 tablet by mouth every 8 hours as needed for Pain 10/9/21  Yes Fernie Noble DO   penicillin v potassium (VEETID) 500 MG tablet Take 1 tablet by mouth 4 times daily for 7 days 10/9/21 10/16/21 Yes Fernie Noble DO       REVIEW OFSYSTEMS    (2-9 systems for level 4, 10 or more for level 5)      Review of Systems   Constitutional: Negative for chills and fever. HENT: Positive for dental problem, ear pain and facial swelling. Negative for congestion, drooling, rhinorrhea and sore throat. Respiratory: Negative for cough and shortness of breath. Cardiovascular: Negative for chest pain and leg swelling. Gastrointestinal: Negative for abdominal pain, constipation, diarrhea, nausea and vomiting. Genitourinary: Negative for dysuria, frequency and hematuria. Musculoskeletal: Negative for arthralgias, back pain and neck pain. Skin: Negative for rash. Neurological: Negative for dizziness, weakness, light-headedness and numbness. Psychiatric/Behavioral: Negative for confusion. All other systems reviewed and are negative. PHYSICAL EXAM   (up to 7 for level 4, 8 or more forlevel 5)      INITIAL VITALS:   Vitals:    10/09/21 1806   BP: (!) 165/109   Pulse: 87   Resp: 18   Temp: 98.9 °F (37.2 °C)   TempSrc: Oral   SpO2: 98%   Weight: 190 lb (86.2 kg)   Height: 5' 3\" (1.6 m)        Physical Exam  Vitals and nursing note reviewed. Constitutional:       General: She is not in acute distress. Appearance: She is not toxic-appearing.       Comments: Adult female sitting in stretcher no acute distress. Speaks in full sentences and answers questions appropriately. HENT:      Head: Normocephalic and atraumatic. Comments: Moderate erythema and edema to the right face over the angle of the mandible and cheek with mild tenderness to palpation     Right Ear: Tympanic membrane, ear canal and external ear normal.      Left Ear: Tympanic membrane, ear canal and external ear normal.      Nose: Nose normal. No congestion or rhinorrhea. Mouth/Throat:      Mouth: Mucous membranes are moist.      Pharynx: Oropharynx is clear. No oropharyngeal exudate or posterior oropharyngeal erythema. Comments: Extremely poor dentition with multiple dental caries. There is erythema and edema to the gingiva of nearly every tooth on the right lower jaw. There is an area of fluctuance from tooth 29-31 with tenderness to palpation. There is no oropharyngeal erythema or edema. Uvula is midline. Submental space is soft. Eyes:      Pupils: Pupils are equal, round, and reactive to light. Neck:      Comments: Trachea is midline. There is no anterior neck swelling. Cardiovascular:      Rate and Rhythm: Normal rate and regular rhythm. Pulses: Normal pulses. Pulmonary:      Effort: Pulmonary effort is normal. No respiratory distress. Breath sounds: Normal breath sounds. Abdominal:      General: Abdomen is flat. There is no distension. Tenderness: There is no abdominal tenderness. Musculoskeletal:      Cervical back: Normal range of motion and neck supple. No rigidity or tenderness. Right lower leg: No edema. Left lower leg: No edema. Skin:     General: Skin is warm and dry. Capillary Refill: Capillary refill takes less than 2 seconds. Findings: Erythema present. Neurological:      General: No focal deficit present. Mental Status: She is alert.    Psychiatric:         Mood and Affect: Mood normal.         Behavior: Behavior normal. DIFFERENTIAL  DIAGNOSIS     DDX: Dental abscess, dental caries, cellulitis    Initial MDM/Plan: 48 y.o. female with past medical history of COPD and hypertension who presents with dental pain and right-sided facial swelling that was first noticed this morning upon waking. On physical exam, patient is awake alert, nontoxic-appearing. Her vitals are remarkable for mild hypertension. She is afebrile. She has moderate swelling to the right side of her face over the right cheek in right mandible with associated tenderness to palpation. The gingiva to the entire right mandible appear erythematous and edematous. There is an area of fluctuance just beneath tooth 29 and 31. There is no evidence of Palmira's angina and patient's airway is patent. Will attempt drainage of the 2 areas with the most fluctuance and start the patient on an antibiotic. Patient will require follow-up with a dentist as soon as possible. DIAGNOSTIC RESULTS / EMERGENCYDEPARTMENT COURSE / MDM     LABS:  No results found for this visit on 10/09/21. RADIOLOGY:  No results found. EKG  None      MEDICATIONS ORDERED:  Orders Placed This Encounter   Medications    penicillin v potassium (VEETID) tablet 500 mg     Order Specific Question:   Antimicrobial Indications     Answer:   Head and Neck Infection    lidocaine 1 % injection 20 mL    ibuprofen (ADVIL;MOTRIN) tablet 800 mg    HYDROcodone-acetaminophen (NORCO) 5-325 MG per tablet 1 tablet    HYDROcodone-acetaminophen (NORCO) 5-325 MG per tablet     Sig: Take 1 tablet by mouth every 8 hours as needed for Pain for up to 3 days. Intended supply: 3 days.  Take lowest dose possible to manage pain     Dispense:  9 tablet     Refill:  0    ibuprofen (IBU) 800 MG tablet     Sig: Take 1 tablet by mouth every 8 hours as needed for Pain     Dispense:  21 tablet     Refill:  0    penicillin v potassium (VEETID) 500 MG tablet     Sig: Take 1 tablet by mouth 4 times daily for 7 days Dispense:  28 tablet     Refill:  0         PROCEDURES:  PROCEDURE NOTE - INCISION and DRAINAGE    PATIENT NAME: Jeevan Coppola  MEDICAL RECORD NO. [id number]  DATE: 10/9/2021  ATTENDING PHYSICIAN: Dr. Faustino Martins DIAGNOSIS:  Abscess  POSTOPERATIVE DIAGNOSIS:  Same  PROCEDURE PERFORMED:   Incision and drainage  PERFORMING PHYSICIAN: Micky Ingram DO      DISCUSSION:  Jeevan Coppola is a 48y.o.-year-old female who requires an incision and drainage of a Abscess. The history and physical examination were reviewed and confirmed. CONSENT: The patient provided verbal consent for this procedure. PROCEDURE:  The patient was positioned appropriately and the skin over the incision site was cleansed with water. Local anesthesia was obtained by infiltration using 1% Lidocaine without epinephrine. An incision was then made over the greatest area of fluctuance and approximately 1 cc of purulent material was expressed followed by a moderate amount of bleeding. Loculations were not present. The drainage cavity was then irrigated. The patient tolerated the procedure well. COMPLICATIONS:  bleeding     Micky Ingram DO  7:46 PM, 10/9/21      CONSULTS:  None      EMERGENCY DEPARTMENT COURSE:  ED Course as of Oct 09 2342   Sat Oct 09, 2021   8305              2004 Dental abscess drainage attempted and was mostly unsuccessful. There was a very small amount of purulence expressed, less than 1 cc, followed by a decent amount of bleeding. Patient tolerated procedure well but with some pain. Patient is stable for discharge at this time. Will discharge her home with a prescription for penicillin which she is to take 4 times a day for the next week. Patient is to call a dentist tomorrow to schedule appointment as soon as possible for recheck.   She was given strict instructions to return if she develops any worsening pain, swelling, redness or inability to swallow or difficulty breathing.   [KA]      ED Course User Index  [KA] Arti Humphrey DO          FINAL IMPRESSION      1. Pain due to dental caries    2. Dental abscess          DISPOSITION / PLAN     DISPOSITION Decision To Discharge 10/09/2021 07:58:50 PM      PATIENT REFERRED TO:  Tello Tidwell MD  [address]  [phone]    Schedule an appointment as soon as possible for a visit       [facility] ED  [place] [address] [phone]    If symptoms worsen      DISCHARGE MEDICATIONS:  Discharge Medication List as of 10/9/2021  8:03 PM      START taking these medications    Details   HYDROcodone-acetaminophen (NORCO) 5-325 MG per tablet Take 1 tablet by mouth every 8 hours as needed for Pain for up to 3 days. Intended supply: 3 days.  Take lowest dose possible to manage pain, Disp-9 tablet, R-0Print      ibuprofen (IBU) 800 MG tablet Take 1 tablet by mouth every 8 hours as needed for Pain, Disp-21 tablet, R-0Print      penicillin v potassium (VEETID) 500 MG tablet Take 1 tablet by mouth 4 times daily for 7 days, Disp-28 tablet, R-0Print             Arti Humphrey DO  Emergency Medicine Resident    (Please note that portions of this note were completed with a voice recognition program.Efforts were made to edit the dictations but occasionally words are mis-transcribed.)        Arti Humphrey DO  Resident  10/09/21 6656

## 2021-10-09 NOTE — ED PROVIDER NOTES
EMERGENCY DEPARTMENT ENCOUNTER   ATTENDING ATTESTATION     Pt Name: Casey Fuentes Call  MRN: [id number]  [place] 1975  Date of evaluation: 10/9/21       Casey Coppola is a 48 y.o. female who presents with Dental Pain and Facial Swelling      MDM:   Right dental abscess, lower right first molar  Will start antibiotics  Patient asking for I and D  Will attempt    No trismus  No drooling  Not in sniffing position  Phonating well  Tolerating secretions  Airway open and clear  No stridor  No edema in oral cavity or post pharynx    Do not suspect odilia's angina, floor of mouth not edematous    Vitals:   Vitals:    10/09/21 1806   BP: (!) 165/109   Pulse: 87   Resp: 18   Temp: 98.9 °F (37.2 °C)   TempSrc: Oral   SpO2: 98%   Weight: 190 lb (86.2 kg)   Height: 5' 3\" (1.6 m)         I personally evaluated and examined the patient in conjunction with the resident and agree with the assessment, treatment plan, and disposition of the patient as recorded by the resident. I performed a history and physical examination of the patient and discussed management with the resident. I reviewed the residents note and agree with the documented findings and plan of care. Any areas of disagreement are noted on the chart. I was personally present for the key portions of any procedures. I have documented in the chart those procedures where I was not present during the key portions. I have personally reviewed all images and agree with the resident's interpretation. I have reviewed the emergency nurses triage note. I agree with the chief complaint, past medical history, past surgical history, allergies, medications, social and family history as documented unless otherwise noted. The care is provided during an unprecedented national emergency due to the novel coronavirus, COVID 19.   Herman Lawson MD  Attending Emergency Physician           Herman Lawson MD  10/09/21 Yisel Marshall

## 2023-03-04 ENCOUNTER — HOSPITAL ENCOUNTER (EMERGENCY)
Age: 48
Discharge: HOME | End: 2023-03-04
Attending: EMERGENCY MEDICINE
Payer: OTHER GOVERNMENT

## 2023-03-04 VITALS
OXYGEN SATURATION: 99 % | TEMPERATURE: 97.3 F | HEART RATE: 69 BPM | DIASTOLIC BLOOD PRESSURE: 100 MMHG | SYSTOLIC BLOOD PRESSURE: 159 MMHG | RESPIRATION RATE: 18 BRPM

## 2023-03-04 DIAGNOSIS — L24.9 IRRITANT CONTACT DERMATITIS, UNSPECIFIED TRIGGER: Primary | ICD-10-CM

## 2023-03-04 PROCEDURE — 99283 EMERGENCY DEPT VISIT LOW MDM: CPT

## 2023-03-04 RX ORDER — TRIAMCINOLONE ACETONIDE 1 MG/G
CREAM TOPICAL
Qty: 15 G | Refills: 0 | Status: SHIPPED | OUTPATIENT
Start: 2023-03-04

## 2023-03-04 RX ORDER — DIPHENHYDRAMINE HCL 25 MG
25 CAPSULE ORAL EVERY 6 HOURS
Qty: 40 CAPSULE | Refills: 0 | Status: SHIPPED | OUTPATIENT
Start: 2023-03-04 | End: 2023-03-14

## 2023-03-04 ASSESSMENT — ENCOUNTER SYMPTOMS
VOMITING: 0
ABDOMINAL PAIN: 0
SHORTNESS OF BREATH: 0
NAUSEA: 0
EYE REDNESS: 0
DIARRHEA: 0
RHINORRHEA: 0

## 2023-03-04 NOTE — ED PROVIDER NOTES
[facility]  ED  Emergency Department Encounter  Emergency Medicine Resident     Pt Alex BRYANT Call  MRN: [id number]  [place] 1975  Date of evaluation: 3/4/23  PCP:  Angel Mendoza MD  Note Started: 5:05 PM EST    CHIEF COMPLAINT       Chief Complaint   Patient presents with    Rash     HISTORY OF PRESENT ILLNESS  (Location/Symptom, Timing/Onset, Context/Setting, Quality, Duration, Modifying Factors, Severity.)      Kassidy Obando Call is a 46 y.o. female who presents with a rash on hands, arms, torso, and face which started months ago. Patient states the rash for started on her hands then progressed to forearm, the use. She also notes that she does have 1 small rash on her left nipple. She states that she has been itching the rash and it does have some burning sensation to it. Patient states that she has been cleaning a lot for her other family members who are incapacitated at this time. She states that she use Betina dish soap as well as Pine-Sol. Recently she has started using Benadryl which is helped with the itching somewhat. She last used Benadryl last night. PAST MEDICAL / SURGICAL / SOCIAL / FAMILY HISTORY      has a past medical history of Asthma, Carpal tunnel syndrome, Chronic kidney disease, COPD (chronic obstructive pulmonary disease) ([address] Hypertension, and Liver disease. has a past surgical history that includes pr excision bone mandible (N/A, 3/17/2017); laryngoscopy (06/17/2019); and laryngoscopy (Bilateral, 6/17/2019). Social History     Socioeconomic History    Marital status:       Spouse name: Not on file    Number of children: Not on file    Years of education: Not on file    Highest education level: Not on file   Occupational History    Not on file   Tobacco Use    Smoking status: Every Day     Packs/day: 0.50     Years: 15.00     Pack years: 7.50     Types: Cigarettes    Smokeless tobacco: Never    Tobacco comments:     gum ordered   Vaping Use    Vaping Use: Never used   Substance and Sexual Activity    Alcohol use: Not Currently     Comment: 6-8 cans of beer per day    Drug use: Yes     Types: Marijuana Princella Fergusson)    Sexual activity: Not on file   Other Topics Concern    Not on file   Social History Narrative    Not on file     Social Determinants of Health     Financial Resource Strain: Not on file   Food Insecurity: Not on file   Transportation Needs: Not on file   Physical Activity: Not on file   Stress: Not on file   Social Connections: Not on file   Intimate Partner Violence: Not on file   Housing Stability: Not on file       Family History   Problem Relation Age of Onset    Cancer Mother     Cancer Sister     Colon Cancer Nephew        Allergies:  Tramadol    Home Medications:  Prior to Admission medications    Medication Sig Start Date End Date Taking? Authorizing Provider   triamcinolone (KENALOG) 0.1 % cream Apply topically 2 times daily. 3/4/23  Yes Elvira Howell MD   diphenhydrAMINE (BENADRYL) 25 MG capsule Take 1 capsule by mouth in the morning and 1 capsule at noon and 1 capsule in the evening and 1 capsule before bedtime. Do all this for 10 days. 3/4/23 3/14/23 Yes Elvira Howell MD   ibuprofen (IBU) 800 MG tablet Take 1 tablet by mouth every 8 hours as needed for Pain  Patient not taking: Reported on 3/4/2023 10/9/21   Audi Sal DO     REVIEW OF SYSTEMS       Review of Systems   Constitutional:  Negative for fever. HENT:  Negative for congestion and rhinorrhea. Eyes:  Negative for redness. Respiratory:  Negative for shortness of breath. Cardiovascular:  Negative for chest pain. Gastrointestinal:  Negative for abdominal pain, diarrhea, nausea and vomiting. Genitourinary:  Negative for dysuria. Musculoskeletal:  Negative for joint swelling. Skin:  Positive for rash. Negative for wound. Neurological:  Negative for headaches.      PHYSICAL EXAM      INITIAL VITALS:   BP (!) 159/100   Pulse 69   Temp 97.3 °F (36.3 °C) (Oral)   Resp 18   SpO2 99%     Physical Exam  Constitutional:       General: She is not in acute distress. Appearance: Normal appearance. HENT:      Head: Normocephalic and atraumatic. Nose: Nose normal.      Mouth/Throat:      Mouth: Mucous membranes are moist.   Eyes:      Extraocular Movements: Extraocular movements intact. Pupils: Pupils are equal, round, and reactive to light. Cardiovascular:      Rate and Rhythm: Normal rate and regular rhythm. Pulses: Normal pulses. Heart sounds: Normal heart sounds. No murmur heard. Pulmonary:      Effort: Pulmonary effort is normal. No respiratory distress. Breath sounds: Normal breath sounds. No wheezing. Abdominal:      General: There is no distension. Palpations: Abdomen is soft. Tenderness: There is no abdominal tenderness. There is no guarding or rebound. Musculoskeletal:         General: Normal range of motion. Cervical back: Normal range of motion. Right lower leg: No edema. Left lower leg: No edema. Skin:     Comments: Erythematous and scaling rash noted to dorsum of bilateral hands. Few scattered patches noted over the anterior right upper arm as well as on the left breast. Similar rash noted over bilateral eyelids, forehead and right ear. Neurological:      General: No focal deficit present. Mental Status: She is alert and oriented to person, place, and time. DDX/DIAGNOSTIC RESULTS / EMERGENCY DEPARTMENT COURSE / MDM     Medical Decision Making  75-year-old female presenting with rash to hands, arms, torso and face. Rash is worse over the hands. Differential includes contact dermatitis versus psoriasis however less likely to be psoriasis is not primarily over extensor surfaces. Patient has used intermittent Benadryl. Recommend Benadryl every 6 hours for itching control throughout the day. We will also trial triamcinolone on the hands arms torso.   Instruct patient to use triamcinolone on the face. Risk  OTC drugs. Prescription drug management. Critical Care  Total time providing critical care: < 30 minutes    EKG  Not indicated. All EKG's are interpreted by the Emergency Department Physician who either signs or Co-signs this chart in the absence of a cardiologist.    EMERGENCY DEPARTMENT COURSE:  53-year-old female presenting to the emergency department for rash. See pictures above. Rash appears consistent with a contact dermatitis, likely from cleaning solutions. Recommended to use Benadryl every 6 hours for itching control throughout the day. Also will trial triamcinolone on the hands arms and torso avoiding use on the face. Did reiterate to patient that the triamcinolone should not be used on the face and patient verbalized understanding. Patient also provided with follow-up information with dermatologist for reevaluation of the rash especially if the cream does not help improve symptoms. PROCEDURES:  none    CONSULTS:  None    CRITICAL CARE:  There was significant risk of life threatening deterioration of patient's condition requiring my direct management. Critical care time 0 minutes, excluding any documented procedures. FINAL IMPRESSION      1. Irritant contact dermatitis, unspecified trigger        DISPOSITION / PLAN     DISPOSITION Decision To Discharge 03/04/2023 05:31:42 PM      PATIENT REFERRED TO:  Julio Morales, [address] [place] [address]  [phone]    Schedule an appointment as soon as possible for a visit   for re-evaluation of rash    [facility]  [address] [address]  [phone]    As needed, If symptoms worsen    DISCHARGE MEDICATIONS:  Discharge Medication List as of 3/4/2023  5:34 PM        START taking these medications    Details   triamcinolone (KENALOG) 0.1 % cream Apply topically 2 times daily. , Disp-15 g, R-0, Print diphenhydrAMINE (BENADRYL) 25 MG capsule Take 1 capsule by mouth in the morning and 1 capsule at noon and 1 capsule in the evening and 1 capsule before bedtime. Do all this for 10 days. , Disp-40 capsule, R-0Print             Chrissie Schwab, MD  Emergency Medicine Resident    (Please note that portions of thisnote were completed with a voice recognition program.  Efforts were made to edit the dictations but occasionally words are mis-transcribed.)       Raphael Beckman MD  Resident  03/04/23 3652

## 2023-03-04 NOTE — ED NOTES
Pt to ed with c/o rash x 6 months. Pt states she is not sure what caused the initial rash. States 6 months ago she woke up with a rash on her hands. Pt currently has rash to hands, face, arms, right side. Rash is scaly, itching, bleeding at times. Pt states she has taken benadryl with little relief. Pt did not take any benadryl today. Pt also states its painful.       Ok Daniella, DESEAN  03/04/23 3284

## 2023-03-04 NOTE — DISCHARGE INSTRUCTIONS
The rash appears to be a contact dermatitis or due to an irritant. It is difficult to say exactly what is causing the rash however it is recommended that you use heavy-duty cleaning gloves when cleaning with Pine-Sol and other substances. It is recommended that you use Benadryl every 6 hours to help with the itching. Try to avoid touching the rash as this can worsen the rash and the itching. Use the triamcinolone cream twice a day onto the rash on your body. Please do not use this cream on your face as this can cause skin discoloration. The skin on your face is very sensitive. Please call and schedule an appointment with a dermatologist for reevaluation of your rash especially if no improvement.

## 2023-03-04 NOTE — Clinical Note
Laura Lopez Call was seen and treated in our emergency department on 3/4/2023. She may return to work on 03/05/2023. If you have any questions or concerns, please don't hesitate to call.       Diogo Mitchell MD

## 2023-03-04 NOTE — ED PROVIDER NOTES
[facility]     Emergency Department     Faculty Attestation    I performed a history and physical examination of the patient and discussed management with the resident. I reviewed the resident´s note and agree with the documented findings and plan of care. Any areas of disagreement are noted on the chart. I was personally present for the key portions of any procedures. I have documented in the chart those procedures where I was not present during the key portions. I have reviewed the emergency nurses triage note. I agree with the chief complaint, past medical history, past surgical history, allergies, medications, social and family history as documented unless otherwise noted below. For Physician Assistant/ Nurse Practitioner cases/documentation I have personally evaluated this patient and have completed at least one if not all key elements of the E/M (history, physical exam, and MDM). Additional findings are as noted.    Contact dermatitis most noticeable on the dorsum of the hands, good airway, patient does not appear ill or toxic.     Da Conteh MD  03/04/23 2351

## 2023-03-07 NOTE — PROGRESS NOTES
Future Appointments   Date Time Provider Dalia Ware   4/28/2023 10:30 AM [facility]

## 2023-04-28 ENCOUNTER — OFFICE VISIT (OUTPATIENT)
Dept: DERMATOLOGY | Age: 48
End: 2023-04-28

## 2023-04-28 VITALS
HEIGHT: 63 IN | WEIGHT: 222 LBS | OXYGEN SATURATION: 98 % | SYSTOLIC BLOOD PRESSURE: 130 MMHG | DIASTOLIC BLOOD PRESSURE: 86 MMHG | BODY MASS INDEX: 39.34 KG/M2 | TEMPERATURE: 97.8 F | HEART RATE: 69 BPM

## 2023-04-28 DIAGNOSIS — L20.84 INTRINSIC ATOPIC DERMATITIS: Primary | ICD-10-CM

## 2023-04-28 DIAGNOSIS — L91.8 INFLAMED ACROCHORDON: ICD-10-CM

## 2023-04-28 DIAGNOSIS — K12.2 ORAL ABSCESS: ICD-10-CM

## 2023-04-28 PROCEDURE — 3078F DIAST BP <80 MM HG: CPT | Performed by: PHYSICIAN ASSISTANT

## 2023-04-28 PROCEDURE — 99204 OFFICE O/P NEW MOD 45 MIN: CPT | Performed by: PHYSICIAN ASSISTANT

## 2023-04-28 PROCEDURE — 3074F SYST BP LT 130 MM HG: CPT | Performed by: PHYSICIAN ASSISTANT

## 2023-04-28 PROCEDURE — 11200 RMVL SKIN TAGS UP TO&INC 15: CPT | Performed by: PHYSICIAN ASSISTANT

## 2023-04-28 RX ORDER — DOXYCYCLINE HYCLATE 100 MG/1
CAPSULE ORAL
Qty: 20 CAPSULE | Refills: 0 | Status: SHIPPED | OUTPATIENT
Start: 2023-04-28

## 2023-04-28 RX ORDER — CLOBETASOL PROPIONATE 0.5 MG/G
OINTMENT TOPICAL
Qty: 60 G | Refills: 3 | Status: SHIPPED | OUTPATIENT
Start: 2023-04-28

## 2023-04-28 RX ORDER — TACROLIMUS 1 MG/G
OINTMENT TOPICAL
Qty: 30 G | Refills: 3 | Status: SHIPPED | OUTPATIENT
Start: 2023-04-28

## 2023-05-01 NOTE — ADDENDUM NOTE
Addended by: Grace Vicente on: 5/1/2023 07:29 AM     Modules accepted: Level of Service Detail Level: Zone Detail Level: Generalized

## 2023-05-01 NOTE — PROGRESS NOTES
skin is still damp   - cotton liner gloves under rubber gloves       Electronically signed by Malik Andrews PA-C on 5/1/23 at 7:20 AM EDT

## 2023-05-27 NOTE — CARE COORDINATION
Case Management Initial Discharge Plan  [place] Call,             Met with:patient to discuss discharge plans. Information verified: address, contacts, phone number, , insurance Yes  PCP: Randall Yoder MD  Date of last visit: the 5th    Insurance Provider: [facility]    Discharge Planning    Living Arrangements:  Children, Family Members   Support Systems:  Family Members, [id number] has 1 stories  4 stairs to climb to get into front door, 0stairs to climb to reach second floor  Location of bedroom/bathroom in home main    Patient able to perform ADL's:Independent    Current Services (outpatient & in home) current with [facility]  DME equipment: 0  DME provider: 0    Pharmacy: [facility] on [address]   Potential Assistance Purchasing Medications:  No  Does patient want to participate in local refill/ meds to beds program?  Yes    Potential Assistance Needed:  N/A    Patient agreeable to home care: Yes  Freedom of choice provided:  yes    Prior SNF/Rehab Placement and Facility: n/a  Agreeable to SNF/Rehab: No  [place] of choice provided: n/a   Evaluation: no    Expected Discharge date:  19  Patient expects to be discharged to:  Home  Follow Up Appointment: Best Day/ Time: Monday AM    Transportation provider: unknown  Transportation arrangements needed for discharge: probable    Readmission Risk              Risk of Unplanned Readmission:        16             Does patient have a readmission risk score greater than 14?: Yes  If yes, follow-up appointment must be made within 7 days of discharge. Discharge Plan: Current with [facility] . Will go home with [facility].           Electronically signed by Sunita Cortés RN on 19 at 3:50 PM No

## 2024-06-23 ENCOUNTER — HOSPITAL ENCOUNTER (EMERGENCY)
Age: 49
Discharge: HOME | End: 2024-06-23
Attending: EMERGENCY MEDICINE

## 2024-06-23 ENCOUNTER — APPOINTMENT (OUTPATIENT)
Dept: GENERAL RADIOLOGY | Age: 49
End: 2024-06-23

## 2024-06-23 VITALS
HEIGHT: 65 IN | OXYGEN SATURATION: 98 % | RESPIRATION RATE: 15 BRPM | DIASTOLIC BLOOD PRESSURE: 88 MMHG | SYSTOLIC BLOOD PRESSURE: 140 MMHG | TEMPERATURE: 97 F | WEIGHT: 180 LBS | BODY MASS INDEX: 29.99 KG/M2 | HEART RATE: 99 BPM

## 2024-06-23 DIAGNOSIS — S92.414A CLOSED NONDISPLACED FRACTURE OF PROXIMAL PHALANX OF RIGHT GREAT TOE, INITIAL ENCOUNTER: Primary | ICD-10-CM

## 2024-06-23 DIAGNOSIS — F10.920 ACUTE ALCOHOLIC INTOXICATION WITHOUT COMPLICATION (HCC): ICD-10-CM

## 2024-06-23 LAB
ALBUMIN SERPL-MCNC: 4.2 G/DL (ref 3.5–5.2)
ALP SERPL-CCNC: 103 U/L (ref 35–104)
ALT SERPL-CCNC: 14 U/L (ref 5–33)
ANION GAP SERPL CALCULATED.3IONS-SCNC: 15 MMOL/L (ref 9–17)
APAP SERPL-MCNC: <5 UG/ML (ref 10–30)
AST SERPL-CCNC: 24 U/L
BILIRUB SERPL-MCNC: 0.3 MG/DL (ref 0.3–1.2)
BUN SERPL-MCNC: 7 MG/DL (ref 6–20)
CALCIUM SERPL-MCNC: 9 MG/DL (ref 8.6–10.4)
CHLORIDE SERPL-SCNC: 99 MMOL/L (ref 98–107)
CO2 SERPL-SCNC: 24 MMOL/L (ref 20–31)
CREAT SERPL-MCNC: 0.6 MG/DL (ref 0.5–0.9)
CRP SERPL HS-MCNC: 3.6 MG/L (ref 0–5)
ERYTHROCYTE [DISTWIDTH] IN BLOOD BY AUTOMATED COUNT: 14.2 % (ref 11.5–14.9)
ETHANOL PERCENT: 0.29 %
ETHANOLAMINE SERPL-MCNC: 291 MG/DL (ref 0–0.08)
GFR, ESTIMATED: >90 ML/MIN/1.73M2
GLUCOSE SERPL-MCNC: 103 MG/DL (ref 70–99)
HCT VFR BLD AUTO: 43.4 % (ref 36–46)
HGB BLD-MCNC: 14.2 G/DL (ref 12–16)
MCH RBC QN AUTO: 30.6 PG (ref 26–34)
MCHC RBC AUTO-ENTMCNC: 32.7 G/DL (ref 31–37)
MCV RBC AUTO: 93.6 FL (ref 80–100)
PLATELET # BLD AUTO: 410 K/UL (ref 150–450)
PMV BLD AUTO: 6.5 FL (ref 6–12)
POTASSIUM SERPL-SCNC: 3.6 MMOL/L (ref 3.7–5.3)
PROT SERPL-MCNC: 7.5 G/DL (ref 6.4–8.3)
RBC # BLD AUTO: 4.64 M/UL (ref 4–5.2)
SALICYLATES SERPL-MCNC: <1 MG/DL (ref 3–10)
SODIUM SERPL-SCNC: 138 MMOL/L (ref 135–144)
TRICYCLIC ANTIDEPRESSANTS, UR: NEGATIVE
TSH SERPL DL<=0.05 MIU/L-ACNC: 1.22 UIU/ML (ref 0.3–5)
WBC OTHER # BLD: 11.7 K/UL (ref 3.5–11)

## 2024-06-23 PROCEDURE — 36415 COLL VENOUS BLD VENIPUNCTURE: CPT

## 2024-06-23 PROCEDURE — G0480 DRUG TEST DEF 1-7 CLASSES: HCPCS

## 2024-06-23 PROCEDURE — 80053 COMPREHEN METABOLIC PANEL: CPT

## 2024-06-23 PROCEDURE — 99284 EMERGENCY DEPT VISIT MOD MDM: CPT

## 2024-06-23 PROCEDURE — 86140 C-REACTIVE PROTEIN: CPT

## 2024-06-23 PROCEDURE — 80143 DRUG ASSAY ACETAMINOPHEN: CPT

## 2024-06-23 PROCEDURE — 80307 DRUG TEST PRSMV CHEM ANLYZR: CPT

## 2024-06-23 PROCEDURE — 84443 ASSAY THYROID STIM HORMONE: CPT

## 2024-06-23 PROCEDURE — 80179 DRUG ASSAY SALICYLATE: CPT

## 2024-06-23 PROCEDURE — 73630 X-RAY EXAM OF FOOT: CPT

## 2024-06-23 PROCEDURE — 85027 COMPLETE CBC AUTOMATED: CPT

## 2024-06-23 NOTE — ED TRIAGE NOTES
Mode of arrival (squad #, walk in, police, etc) : [employer]         Chief complaint(s): right foot injury, mental health evaluation     Arrival Note (brief scenario, treatment PTA, etc).: pt presents to ed voluntarily via [employer] after a well check call was made. [employer] officer reports her son was just seen on news this week after a altercation with police and pt started drinking after being sober for four years. [employer] states was called for a well check due to SI. Pt states she did say SHE was suicidal during an argument out of anger but would never kill herself. Pt states had a bottle of vodka today. Pt also reports injury to right foot. Pt foot bruised and swelling noted. Pt unsure of how injury occurred. Pt appears to be intoxicated by evidence of slurred speech and unsteady gait.         C= \"Have you ever felt that you should Cut down on your drinking?\"  No  A= \"Have people Annoyed you by criticizing your drinking?\"  No  G= \"Have you ever felt bad or Guilty about your drinking?\"  No  E= \"Have you ever had a drink as an Eye-opener first thing in the morning to steady your nerves or to help a hangover?\"  No      Deferred []      Reason for deferring: N/A    *If yes to two or more: probable alcohol abuse.*

## 2024-06-23 NOTE — ED PROVIDER NOTES
eMERGENCY dEPARTMENT eNCOUnter      Pt Name: Lisa Coppola  MRN: [id number]  Birthdate 1975  Date of evaluation: 6/23/24      CHIEF COMPLAINT       Chief Complaint   Patient presents with    Foot Injury     right    Mental Health Problem         HISTORY OF PRESENT ILLNESS    Lisa Coppola is a 49 y.o. female who presents complaining of foot pain.  Patient states that for the last couple days she has been having severe pain in the right foot mostly the great toe.  It swollen is red it is purple.  Patient cannot remember if she did anything to it.  Patient states that she has been drinking alcohol and that this started a couple days ago after her son got into an altercation with .  Patient had been sober for multiple years before this.  Patient evidently today also got into an argument and made a comment about cutting herself and killing herself but she states she does not want to do that and she was just angry.      REVIEW OF SYSTEMS       Review of Systems   Constitutional:  Negative for activity change, appetite change, chills, diaphoresis and fever.   HENT:  Negative for congestion, ear pain, facial swelling, nosebleeds, rhinorrhea, sinus pressure, sore throat and trouble swallowing.    Eyes:  Negative for pain, discharge and redness.   Respiratory:  Negative for cough, chest tightness, shortness of breath and wheezing.    Cardiovascular:  Negative for chest pain, palpitations and leg swelling.   Gastrointestinal:  Negative for abdominal pain, blood in stool, constipation, diarrhea, nausea and vomiting.   Genitourinary:  Negative for difficulty urinating, dysuria, flank pain, frequency, genital sores and hematuria.   Musculoskeletal:  Negative for arthralgias, back pain, gait problem, joint swelling, myalgias and neck pain.        Foot injury and pain   Skin:  Negative for color change, pallor, rash and wound.   Neurological:  Negative for dizziness, tremors, seizures, syncope, speech difficulty, weakness,

## 2024-10-03 ENCOUNTER — HOSPITAL ENCOUNTER (EMERGENCY)
Age: 49
Discharge: HOME | End: 2024-10-03
Attending: EMERGENCY MEDICINE

## 2024-10-03 VITALS
HEART RATE: 75 BPM | TEMPERATURE: 98.1 F | BODY MASS INDEX: 28.67 KG/M2 | RESPIRATION RATE: 18 BRPM | OXYGEN SATURATION: 98 % | WEIGHT: 170 LBS | SYSTOLIC BLOOD PRESSURE: 155 MMHG | DIASTOLIC BLOOD PRESSURE: 93 MMHG

## 2024-10-03 DIAGNOSIS — N30.00 ACUTE CYSTITIS WITHOUT HEMATURIA: ICD-10-CM

## 2024-10-03 DIAGNOSIS — L30.8 OTHER ECZEMA: Primary | ICD-10-CM

## 2024-10-03 LAB
BACTERIA URNS QL MICRO: ABNORMAL
BILIRUB UR QL STRIP: NEGATIVE
CLARITY UR: CLEAR
COLOR UR: YELLOW
EPI CELLS #/AREA URNS HPF: ABNORMAL /HPF (ref 0–5)
GLUCOSE UR STRIP-MCNC: NEGATIVE MG/DL
HGB UR QL STRIP.AUTO: NEGATIVE
KETONES UR STRIP-MCNC: NEGATIVE MG/DL
LEUKOCYTE ESTERASE UR QL STRIP: ABNORMAL
NITRITE UR QL STRIP: NEGATIVE
PH UR STRIP: 6.5 [PH] (ref 5–8)
PROT UR STRIP-MCNC: NEGATIVE MG/DL
RBC #/AREA URNS HPF: ABNORMAL /HPF (ref 0–2)
SP GR UR STRIP: 1.01 (ref 1–1.03)
UROBILINOGEN UR STRIP-ACNC: NORMAL EU/DL (ref 0–1)
WBC #/AREA URNS HPF: ABNORMAL /HPF (ref 0–5)

## 2024-10-03 PROCEDURE — 87086 URINE CULTURE/COLONY COUNT: CPT

## 2024-10-03 PROCEDURE — 2580000003 HC RX 258: Performed by: PHYSICIAN ASSISTANT

## 2024-10-03 PROCEDURE — 87088 URINE BACTERIA CULTURE: CPT

## 2024-10-03 PROCEDURE — 99284 EMERGENCY DEPT VISIT MOD MDM: CPT

## 2024-10-03 PROCEDURE — 96372 THER/PROPH/DIAG INJ SC/IM: CPT

## 2024-10-03 PROCEDURE — 6360000002 HC RX W HCPCS: Performed by: PHYSICIAN ASSISTANT

## 2024-10-03 PROCEDURE — 81001 URINALYSIS AUTO W/SCOPE: CPT

## 2024-10-03 PROCEDURE — 87186 SC STD MICRODIL/AGAR DIL: CPT

## 2024-10-03 RX ORDER — CEPHALEXIN 500 MG/1
500 CAPSULE ORAL 3 TIMES DAILY
Qty: 21 CAPSULE | Refills: 0 | Status: SHIPPED | OUTPATIENT
Start: 2024-10-03 | End: 2024-10-10

## 2024-10-03 RX ORDER — PREDNISONE 20 MG/1
40 TABLET ORAL DAILY
Qty: 20 TABLET | Refills: 0 | Status: SHIPPED | OUTPATIENT
Start: 2024-10-03 | End: 2024-10-13

## 2024-10-03 RX ADMIN — WATER 125 MG: 1 INJECTION INTRAMUSCULAR; INTRAVENOUS; SUBCUTANEOUS at 11:56

## 2024-10-03 ASSESSMENT — PAIN DESCRIPTION - DESCRIPTORS: DESCRIPTORS: BURNING

## 2024-10-03 ASSESSMENT — PAIN DESCRIPTION - LOCATION: LOCATION: CHEST;FACE

## 2024-10-03 ASSESSMENT — PAIN SCALES - GENERAL: PAINLEVEL_OUTOF10: 10

## 2024-10-03 ASSESSMENT — PAIN - FUNCTIONAL ASSESSMENT: PAIN_FUNCTIONAL_ASSESSMENT: 0-10

## 2024-10-03 NOTE — DISCHARGE INSTRUCTIONS
Take medication as directed.  Follow-up with your family doctor.  Return to the emergency department if you have any other concerns.

## 2024-10-03 NOTE — ED NOTES
Pt presenting to the ED with complaints of rash all over body as well as burning with urination. PT states that this has been going on since a few days ago. Pt reports the rash has also been going into her eyes.

## 2024-10-03 NOTE — ED PROVIDER NOTES
[facility] ED  eMERGENCY dEPARTMENT eNCOUnter      Pt Name: Lisa Coppola  MRN: [id number]  Birthdate 1975  Date of evaluation: 10/3/2024  Provider: Ely Eaton PA-C    CHIEF COMPLAINT       Chief Complaint   Patient presents with    Rash     Pt reports rash to eyelids, face, and chest for the past month. Was started on an oral steroid but denies relief.    Dysuria     HISTORY OF PRESENT ILLNESS  (Location/Symptom, Timing/Onset, Context/Setting, Quality, Duration, Modifying Factors, Severity.)   Lisa Coppola is a 49 y.o. female who presents to the emergency department.  Patient states that she has a history of severe eczema.  She has not been able to get into see anybody because she does not currently have any insurance.  She states she has been very bad around her face her hands in both breasts.  She has been itching and scratching which is caused the areas to bleed.  Patient states this has been going on for the past month.    Patient also states that she has been having some dysuria left-sided flank pain and increased frequency.  This has been going on for the last 2 to 3 days.  Patient denies any blood.  No fever chills cough cold congestion or other symptoms.  She has not seen anyone for this.    Nursing Notes were reviewed.    ALLERGIES     Tramadol    CURRENT MEDICATIONS       Previous Medications    CLOBETASOL (TEMOVATE) 0.05 % OINTMENT    Apply to rash twice daily, as needed (not face, armpit or groin)    DOXYCYCLINE HYCLATE (VIBRAMYCIN) 100 MG CAPSULE    Take 1 pill twice daily with food    IBUPROFEN (IBU) 800 MG TABLET    Take 1 tablet by mouth every 8 hours as needed for Pain    TACROLIMUS (PROTOPIC) 0.1 % OINTMENT    Apply to rash twice daily, as needed (Safe for use on face).    TRIAMCINOLONE (KENALOG) 0.1 % CREAM    Apply topically 2 times daily.       PAST MEDICAL HISTORY         Diagnosis Date    Asthma     Carpal tunnel syndrome     bilateral    Chronic kidney disease      COPD (chronic obstructive pulmonary disease) (HCC)     Hypertension     Liver disease        SURGICAL HISTORY           Procedure Laterality Date    LARYNGOSCOPY  06/17/2019    EXCISION OF BILATERAL VOCAL CORD POLYPS     LARYNGOSCOPY Bilateral 6/17/2019    DIRECT LARYNGOSCOPY MICRO WITH EXCISION OF BILATERAL VOCAL CORD POLYPS performed by Sky Walter MD at [facility] OR    CA EXCISION BONE MANDIBLE N/A 3/17/2017    COMPLEX LACERATION REPAIR WITH DEBRIDEMENT performed by Oliver Zayas DDS at [facility] OR       FAMILY HISTORY           Problem Relation Age of Onset    Cancer Mother     Cancer Sister     Colon Cancer Nephew        SOCIAL HISTORY      reports that she has been smoking cigarettes. She has a 7.5 pack-year smoking history. She has never used smokeless tobacco. She reports that she does not currently use alcohol. She reports current drug use. Drug: Marijuana (Weed).    REVIEW OF SYSTEMS    (2-9 systems for level 4, 10 or more for level 5)     Constitutional: Denies recent fever, chills, weakness.  Visual: Denies recent change in vision, discharge or pain.  ENT: Denies recent sore throat, nasal congestion, ear pain.  Respiratory: Denies recent shortness of breath,  cough , wheezing or pleuritic chest pain.  Cardiac:  Denies recent chest pain palpitations dyspnea on exertion or swelling in the lower extremities.   GI: denies any recent abdominal pain nausea vomiting or diarrhea.   : Positive dysuria, frequency  Musculoskeletal :  Denies neck pain back pain joint pain or recent trauma.  Neurologic: denies any seizure activity headaches stroke like symptoms or syncope.  Skin: Positive rash  Psychiatric:  Denies any thoughts of suicide homicide.  Patient does not voice any problems with anxiety or depression    Except as noted above the remainder of the review of systems was reviewed and negative.     PHYSICAL EXAM    (up to 7 for level 4, 8 or more for level 5)     ED Triage Vitals [10/03/24 1001]   BP Systolic

## 2024-10-04 LAB
MICROORGANISM SPEC CULT: ABNORMAL
SPECIMEN DESCRIPTION: ABNORMAL

## 2025-03-19 ENCOUNTER — HOSPITAL ENCOUNTER (EMERGENCY)
Age: 50
Discharge: HOME | End: 2025-03-19
Attending: EMERGENCY MEDICINE

## 2025-03-19 VITALS
SYSTOLIC BLOOD PRESSURE: 186 MMHG | DIASTOLIC BLOOD PRESSURE: 94 MMHG | HEART RATE: 78 BPM | OXYGEN SATURATION: 98 % | RESPIRATION RATE: 18 BRPM | TEMPERATURE: 98.4 F

## 2025-03-19 DIAGNOSIS — R21 RASH AND OTHER NONSPECIFIC SKIN ERUPTION: Primary | ICD-10-CM

## 2025-03-19 PROCEDURE — 6370000000 HC RX 637 (ALT 250 FOR IP): Performed by: STUDENT IN AN ORGANIZED HEALTH CARE EDUCATION/TRAINING PROGRAM

## 2025-03-19 PROCEDURE — 99283 EMERGENCY DEPT VISIT LOW MDM: CPT

## 2025-03-19 RX ORDER — CEPHALEXIN 500 MG/1
500 CAPSULE ORAL 4 TIMES DAILY
Qty: 28 CAPSULE | Refills: 0 | Status: SHIPPED | OUTPATIENT
Start: 2025-03-19 | End: 2025-03-26

## 2025-03-19 RX ORDER — CEPHALEXIN 500 MG/1
500 CAPSULE ORAL ONCE
Status: COMPLETED | OUTPATIENT
Start: 2025-03-19 | End: 2025-03-19

## 2025-03-19 RX ORDER — PREDNISONE 20 MG/1
40 TABLET ORAL ONCE
Status: COMPLETED | OUTPATIENT
Start: 2025-03-19 | End: 2025-03-19

## 2025-03-19 RX ORDER — PREDNISONE 50 MG/1
50 TABLET ORAL DAILY
Qty: 4 TABLET | Refills: 0 | Status: SHIPPED | OUTPATIENT
Start: 2025-03-20 | End: 2025-03-24

## 2025-03-19 RX ORDER — BENZOCAINE/MENTHOL 6 MG-10 MG
LOZENGE MUCOUS MEMBRANE
Qty: 453.6 G | Refills: 0 | Status: SHIPPED | OUTPATIENT
Start: 2025-03-19 | End: 2025-03-26

## 2025-03-19 RX ADMIN — PREDNISONE 40 MG: 20 TABLET ORAL at 16:27

## 2025-03-19 RX ADMIN — CEPHALEXIN 500 MG: 500 CAPSULE ORAL at 16:28

## 2025-03-19 ASSESSMENT — PAIN SCALES - GENERAL: PAINLEVEL_OUTOF10: 7

## 2025-03-19 ASSESSMENT — LIFESTYLE VARIABLES
HOW OFTEN DO YOU HAVE A DRINK CONTAINING ALCOHOL: PATIENT DECLINED
HOW MANY STANDARD DRINKS CONTAINING ALCOHOL DO YOU HAVE ON A TYPICAL DAY: PATIENT DECLINED

## 2025-03-19 ASSESSMENT — ENCOUNTER SYMPTOMS
ABDOMINAL PAIN: 0
WHEEZING: 0
STRIDOR: 0
SHORTNESS OF BREATH: 0
BACK PAIN: 0
VOICE CHANGE: 0

## 2025-03-19 ASSESSMENT — PAIN - FUNCTIONAL ASSESSMENT: PAIN_FUNCTIONAL_ASSESSMENT: 0-10

## 2025-03-19 NOTE — ED NOTES
PCP list provided to patient and family. SW also put in a request to [facility] internal med to reach out to patient and schedule an appt.

## 2025-03-19 NOTE — ED PROVIDER NOTES
[facility] EMERGENCY DEPARTMENT  Emergency Department Encounter  Emergency Medicine Resident     Pt Name:Lisa Coppola  MRN: [id number]  Birthdate 1975  Date of evaluation: 3/19/25  PCP:  Oswaldo King MD  Note Started: 5:14 PM EDT      CHIEF COMPLAINT       Chief Complaint   Patient presents with    Rash       HISTORY OF PRESENT ILLNESS  (Location/Symptom, Timing/Onset, Context/Setting, Quality, Duration, Modifying Factors, Severity.)      Lisa Coppola is a 50 y.o. female with PMH including COPD and hypertension who presents emergency department with concerns for diffuse erythematous skin rash.  Skin rash is located on patient's face, arms, hands, chest wall.  States that she has not been seen by primary care physician for \"quite some time.\"  Has attempted triamcinolone cream without relief.  Patient denies fevers, chills, shakes, chest pain, shortness of breath, difficulty breathing, wheezing, stridor, abdominal pains, vomiting.  States that the rash is pruritic and is causing her discomfort.  Is accompanied by her family at bedside. Arrives hypertensive however afebrile and nontoxic-appearing.    PAST MEDICAL / SURGICAL / SOCIAL / FAMILY HISTORY      has a past medical history of Asthma, Carpal tunnel syndrome, Chronic kidney disease, COPD (chronic obstructive pulmonary disease) (HCC), Hypertension, and Liver disease.       has a past surgical history that includes pr excision bone mandible (N/A, 3/17/2017); laryngoscopy (06/17/2019); and laryngoscopy (Bilateral, 6/17/2019).      Social History     Socioeconomic History    Marital status:      Spouse name: Not on file    Number of children: Not on file    Years of education: Not on file    Highest education level: Not on file   Occupational History    Not on file   Tobacco Use    Smoking status: Every Day     Current packs/day: 0.50     Average packs/day: 0.5 packs/day for 15.0 years (7.5 ttl pk-yrs)     Types: Cigarettes     Apply topically to rash 2 -3 times daily for 5 - 7 days., Disp-453.6 g, R-0, Print             Nnamdi Roberts, DO  Emergency Medicine Resident    (Please note that portions of this note were completed with a voice recognition program.  Efforts were made to edit the dictations but occasionally words are mis-transcribed.)

## 2025-03-19 NOTE — ED PROVIDER NOTES
[facility] EMERGENCY DEPARTMENT  eMERGENCY dEPARTMENT eNCOUnter   Attending Attestation     Pt Name: Lisa Coppola  MRN: [id number]  Birthdate 1975  Date of evaluation: 3/19/25       Lisa Coppola is a 50 y.o. female who presents with Rash      5:22 PM EDT      History: Pt presents with rash and concern for eczema over the face, body, hands.  Patient is getting worse.  Patient states has been using over-the-counter things.  Patient has excoriation with dry skin and eczema like features with a lot of scratching and excoriations from picking.  This is noted especially over the hands, antecubital fossa, chest.  Patient also has excoriation over the upper lip.    Exam: Heart rate and rhythm are regular.  Lungs are clear to auscultation bilaterally.  Rash as above.    Plan for steroids, hydrocortisone.  Patient cannot afford triamcinolone at this time.  Recommend she follow-up with dermatology.  Will discharge.  Will also treat with Keflex due to swelling and concern for superimposed infection.    I performed a history and physical examination of the patient and discussed management with the resident. I reviewed the resident’s note and agree with the documented findings and plan of care. Any areas of disagreement are noted on the chart. I was personally present for the key portions of any procedures. I have documented in the chart those procedures where I was not present during the key portions. I have personally reviewed all images and agree with the resident's interpretation. I have reviewed the emergency nurses triage note. I agree with the chief complaint, past medical history, past surgical history, allergies, medications, social and family history as documented unless otherwise noted below. Documentation of the HPI, Physical Exam and Medical Decision Making performed by medical students or scribes is based on my personal performance of the HPI, PE and MDM. For Phys Assistant/ Nurse Practitioner

## 2025-03-19 NOTE — DISCHARGE INSTRUCTIONS
Seen in the emergency department for a rash.  Question if this is eczema.  Received prednisone and Keflex here in the emergency department.  Discharged home with prescriptions for prednisone, Keflex, and hydrocortisone cream.  Please eat take these/use these as they are prescribed.  Please complete prednisone and Keflex dosing.  Please return to the emergency department if you develop fever, worsening skin rash, concerns for infection including open wounds draining pus, worsened redness, or any other concerning symptoms.  Please follow-up with provided primary care by social work.     If you begin using the hydrocortisone cream, please discontinue the triamcinolone cream.  They are similar medications

## 2025-03-20 ENCOUNTER — TELEPHONE (OUTPATIENT)
Dept: INTERNAL MEDICINE | Age: 50
End: 2025-03-20

## 2025-03-20 NOTE — TELEPHONE ENCOUNTER
----- Message from Olamide MOJICA sent at 3/19/2025  5:03 PM EDT -----  Regarding: New pcp appt  Hi NnamdiLisa Call MRN: [id number] is requesting a phone call to schedule a new pcp appt.  Thank you,  Olamide

## 2025-03-20 NOTE — TELEPHONE ENCOUNTER
Called patient. No answer. Left a HIPAA compliant voicemail asking the patient to return our call.    Electronically signed by Nnamdi Luis MA on 3/20/2025 at 11:06 AM

## 2025-05-25 ENCOUNTER — HOSPITAL ENCOUNTER (INPATIENT)
Age: 50
LOS: 3 days | Discharge: HOME | DRG: 581 | End: 2025-05-28
Attending: EMERGENCY MEDICINE | Admitting: SURGERY
Payer: MEDICAID

## 2025-05-25 ENCOUNTER — APPOINTMENT (OUTPATIENT)
Dept: GENERAL RADIOLOGY | Age: 50
DRG: 581 | End: 2025-05-25
Payer: MEDICAID
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
## 2025-05-25 DIAGNOSIS — S41.151A DOG BITE OF RIGHT UPPER EXTREMITY, INITIAL ENCOUNTER: Primary | ICD-10-CM

## 2025-05-25 LAB
ANION GAP SERPL CALCULATED.3IONS-SCNC: 15 MMOL/L (ref 9–16)
BASOPHILS # BLD: 0 K/UL (ref 0–0.2)
BASOPHILS NFR BLD: 0 % (ref 0–2)
BUN SERPL-MCNC: 12 MG/DL (ref 6–20)
CALCIUM SERPL-MCNC: 9 MG/DL (ref 8.6–10.4)
CHLORIDE SERPL-SCNC: 100 MMOL/L (ref 98–107)
CO2 SERPL-SCNC: 23 MMOL/L (ref 20–31)
CREAT SERPL-MCNC: 0.7 MG/DL (ref 0.6–0.9)
EOSINOPHIL # BLD: 0.46 K/UL (ref 0–0.4)
EOSINOPHILS RELATIVE PERCENT: 4 % (ref 1–4)
ERYTHROCYTE [DISTWIDTH] IN BLOOD BY AUTOMATED COUNT: 14.4 % (ref 11.8–14.4)
ETHANOL PERCENT: <0.01 %
ETHANOLAMINE SERPL-MCNC: <10 MG/DL (ref 0–0.08)
GFR, ESTIMATED: >90 ML/MIN/1.73M2
GLUCOSE SERPL-MCNC: 113 MG/DL (ref 74–99)
HCT VFR BLD AUTO: 39.6 % (ref 36.3–47.1)
HGB BLD-MCNC: 13.5 G/DL (ref 11.9–15.1)
IMM GRANULOCYTES # BLD AUTO: 0 K/UL (ref 0–0.3)
IMM GRANULOCYTES NFR BLD: 0 %
LYMPHOCYTES NFR BLD: 5.13 K/UL (ref 1–4.8)
LYMPHOCYTES RELATIVE PERCENT: 45 % (ref 24–44)
MCH RBC QN AUTO: 31.6 PG (ref 25.2–33.5)
MCHC RBC AUTO-ENTMCNC: 34.1 G/DL (ref 28.4–34.8)
MCV RBC AUTO: 92.7 FL (ref 82.6–102.9)
MONOCYTES NFR BLD: 0.46 K/UL (ref 0.1–0.8)
MONOCYTES NFR BLD: 4 % (ref 1–7)
MORPHOLOGY: NORMAL
NEUTROPHILS NFR BLD: 47 % (ref 36–66)
NEUTS SEG NFR BLD: 5.35 K/UL (ref 1.8–7.7)
NRBC BLD-RTO: 0 PER 100 WBC
PLATELET # BLD AUTO: 334 K/UL (ref 138–453)
PMV BLD AUTO: 8.9 FL (ref 8.1–13.5)
POTASSIUM SERPL-SCNC: 3.8 MMOL/L (ref 3.7–5.3)
RBC # BLD AUTO: 4.27 M/UL (ref 3.95–5.11)
SODIUM SERPL-SCNC: 138 MMOL/L (ref 136–145)
WBC OTHER # BLD: 11.4 K/UL (ref 3.5–11.3)

## 2025-05-25 PROCEDURE — 96375 TX/PRO/DX INJ NEW DRUG ADDON: CPT

## 2025-05-25 PROCEDURE — 90471 IMMUNIZATION ADMIN: CPT

## 2025-05-25 PROCEDURE — 6360000002 HC RX W HCPCS: Performed by: EMERGENCY MEDICINE

## 2025-05-25 PROCEDURE — 85025 COMPLETE CBC W/AUTO DIFF WBC: CPT

## 2025-05-25 PROCEDURE — 2500000003 HC RX 250 WO HCPCS: Performed by: STUDENT IN AN ORGANIZED HEALTH CARE EDUCATION/TRAINING PROGRAM

## 2025-05-25 PROCEDURE — 6370000000 HC RX 637 (ALT 250 FOR IP)

## 2025-05-25 PROCEDURE — 2580000003 HC RX 258: Performed by: STUDENT IN AN ORGANIZED HEALTH CARE EDUCATION/TRAINING PROGRAM

## 2025-05-25 PROCEDURE — 90715 TDAP VACCINE 7 YRS/> IM: CPT

## 2025-05-25 PROCEDURE — G0480 DRUG TEST DEF 1-7 CLASSES: HCPCS

## 2025-05-25 PROCEDURE — 6360000002 HC RX W HCPCS

## 2025-05-25 PROCEDURE — 1200000000 HC SEMI PRIVATE

## 2025-05-25 PROCEDURE — 73090 X-RAY EXAM OF FOREARM: CPT

## 2025-05-25 PROCEDURE — 2580000003 HC RX 258

## 2025-05-25 PROCEDURE — 6370000000 HC RX 637 (ALT 250 FOR IP): Performed by: STUDENT IN AN ORGANIZED HEALTH CARE EDUCATION/TRAINING PROGRAM

## 2025-05-25 PROCEDURE — 80048 BASIC METABOLIC PNL TOTAL CA: CPT

## 2025-05-25 PROCEDURE — 99285 EMERGENCY DEPT VISIT HI MDM: CPT

## 2025-05-25 PROCEDURE — 96365 THER/PROPH/DIAG IV INF INIT: CPT

## 2025-05-25 PROCEDURE — 99233 SBSQ HOSP IP/OBS HIGH 50: CPT | Performed by: SURGERY

## 2025-05-25 RX ORDER — POLYETHYLENE GLYCOL 3350 17 G/17G
17 POWDER, FOR SOLUTION ORAL DAILY
Status: DISCONTINUED | OUTPATIENT
Start: 2025-05-26 | End: 2025-05-28 | Stop reason: HOSPADM

## 2025-05-25 RX ORDER — ONDANSETRON 2 MG/ML
4 INJECTION INTRAMUSCULAR; INTRAVENOUS EVERY 6 HOURS PRN
Status: DISCONTINUED | OUTPATIENT
Start: 2025-05-25 | End: 2025-05-28 | Stop reason: HOSPADM

## 2025-05-25 RX ORDER — SENNA AND DOCUSATE SODIUM 50; 8.6 MG/1; MG/1
1 TABLET, FILM COATED ORAL 2 TIMES DAILY
Status: DISCONTINUED | OUTPATIENT
Start: 2025-05-25 | End: 2025-05-28 | Stop reason: HOSPADM

## 2025-05-25 RX ORDER — SODIUM CHLORIDE, SODIUM LACTATE, POTASSIUM CHLORIDE, CALCIUM CHLORIDE 600; 310; 30; 20 MG/100ML; MG/100ML; MG/100ML; MG/100ML
INJECTION, SOLUTION INTRAVENOUS CONTINUOUS
Status: DISCONTINUED | OUTPATIENT
Start: 2025-05-25 | End: 2025-05-26

## 2025-05-25 RX ORDER — CETIRIZINE HYDROCHLORIDE 10 MG/1
10 TABLET ORAL DAILY
Status: DISCONTINUED | OUTPATIENT
Start: 2025-05-26 | End: 2025-05-28 | Stop reason: HOSPADM

## 2025-05-25 RX ORDER — PREDNISONE 20 MG/1
20 TABLET ORAL DAILY
Status: DISCONTINUED | OUTPATIENT
Start: 2025-05-29 | End: 2025-05-28 | Stop reason: HOSPADM

## 2025-05-25 RX ORDER — CLOBETASOL PROPIONATE 0.5 MG/G
OINTMENT TOPICAL 2 TIMES DAILY
Status: DISCONTINUED | OUTPATIENT
Start: 2025-05-25 | End: 2025-05-28 | Stop reason: HOSPADM

## 2025-05-25 RX ORDER — PHENOBARBITAL 32.4 MG/1
130 TABLET ORAL
Status: DISPENSED | OUTPATIENT
Start: 2025-05-25 | End: 2025-05-25

## 2025-05-25 RX ORDER — MORPHINE SULFATE 4 MG/ML
4 INJECTION, SOLUTION INTRAMUSCULAR; INTRAVENOUS ONCE
Status: COMPLETED | OUTPATIENT
Start: 2025-05-25 | End: 2025-05-25

## 2025-05-25 RX ORDER — ALBUTEROL SULFATE 90 UG/1
2 INHALANT RESPIRATORY (INHALATION) EVERY 6 HOURS PRN
COMMUNITY
Start: 2025-04-03

## 2025-05-25 RX ORDER — ENOXAPARIN SODIUM 100 MG/ML
40 INJECTION SUBCUTANEOUS 2 TIMES DAILY
Status: DISCONTINUED | OUTPATIENT
Start: 2025-05-25 | End: 2025-05-28 | Stop reason: HOSPADM

## 2025-05-25 RX ORDER — SODIUM CHLORIDE 0.9 % (FLUSH) 0.9 %
5-40 SYRINGE (ML) INJECTION PRN
Status: DISCONTINUED | OUTPATIENT
Start: 2025-05-25 | End: 2025-05-28 | Stop reason: HOSPADM

## 2025-05-25 RX ORDER — SODIUM CHLORIDE 0.9 % (FLUSH) 0.9 %
5-40 SYRINGE (ML) INJECTION EVERY 12 HOURS SCHEDULED
Status: DISCONTINUED | OUTPATIENT
Start: 2025-05-25 | End: 2025-05-28 | Stop reason: HOSPADM

## 2025-05-25 RX ORDER — INSULIN LISPRO 100 [IU]/ML
0-16 INJECTION, SOLUTION INTRAVENOUS; SUBCUTANEOUS
Status: DISCONTINUED | OUTPATIENT
Start: 2025-05-25 | End: 2025-05-26

## 2025-05-25 RX ORDER — PREDNISONE 20 MG/1
30 TABLET ORAL DAILY
Status: COMPLETED | OUTPATIENT
Start: 2025-05-26 | End: 2025-05-28

## 2025-05-25 RX ORDER — LOSARTAN POTASSIUM 50 MG/1
50 TABLET ORAL DAILY
Status: DISCONTINUED | OUTPATIENT
Start: 2025-05-25 | End: 2025-05-28 | Stop reason: HOSPADM

## 2025-05-25 RX ORDER — METHOCARBAMOL 750 MG/1
750 TABLET, FILM COATED ORAL 4 TIMES DAILY
Status: DISCONTINUED | OUTPATIENT
Start: 2025-05-25 | End: 2025-05-28 | Stop reason: HOSPADM

## 2025-05-25 RX ORDER — TACROLIMUS 1 MG/G
OINTMENT TOPICAL 2 TIMES DAILY PRN
Status: DISCONTINUED | OUTPATIENT
Start: 2025-05-25 | End: 2025-05-28 | Stop reason: HOSPADM

## 2025-05-25 RX ORDER — PREDNISONE 10 MG/1
10 TABLET ORAL SEE ADMIN INSTRUCTIONS
COMMUNITY
Start: 2025-05-22

## 2025-05-25 RX ORDER — KETOROLAC TROMETHAMINE 15 MG/ML
15 INJECTION, SOLUTION INTRAMUSCULAR; INTRAVENOUS EVERY 6 HOURS PRN
Status: DISCONTINUED | OUTPATIENT
Start: 2025-05-25 | End: 2025-05-27

## 2025-05-25 RX ORDER — LOSARTAN POTASSIUM 50 MG/1
50 TABLET ORAL DAILY
COMMUNITY
Start: 2025-05-22

## 2025-05-25 RX ORDER — PREDNISONE 20 MG/1
10 TABLET ORAL DAILY
Status: DISCONTINUED | OUTPATIENT
Start: 2025-06-03 | End: 2025-05-28 | Stop reason: HOSPADM

## 2025-05-25 RX ORDER — ALBUTEROL SULFATE 90 UG/1
2 INHALANT RESPIRATORY (INHALATION) EVERY 6 HOURS PRN
Status: DISCONTINUED | OUTPATIENT
Start: 2025-05-25 | End: 2025-05-28 | Stop reason: HOSPADM

## 2025-05-25 RX ORDER — OXYCODONE HYDROCHLORIDE 5 MG/1
5 TABLET ORAL EVERY 4 HOURS PRN
Status: DISCONTINUED | OUTPATIENT
Start: 2025-05-25 | End: 2025-05-28 | Stop reason: HOSPADM

## 2025-05-25 RX ORDER — FOLIC ACID 1 MG/1
1 TABLET ORAL DAILY
Status: COMPLETED | OUTPATIENT
Start: 2025-05-26 | End: 2025-05-28

## 2025-05-25 RX ORDER — CETIRIZINE HYDROCHLORIDE 10 MG/1
10 TABLET ORAL DAILY
COMMUNITY
Start: 2025-05-22

## 2025-05-25 RX ORDER — PHENOBARBITAL 64.8 MG/1
64.8 TABLET ORAL 3 TIMES DAILY
Status: DISCONTINUED | OUTPATIENT
Start: 2025-05-28 | End: 2025-05-28 | Stop reason: HOSPADM

## 2025-05-25 RX ORDER — MULTIVITAMIN WITH IRON
1 TABLET ORAL DAILY
Status: COMPLETED | OUTPATIENT
Start: 2025-05-26 | End: 2025-05-28

## 2025-05-25 RX ORDER — ONDANSETRON 4 MG/1
4 TABLET, ORALLY DISINTEGRATING ORAL EVERY 8 HOURS PRN
Status: DISCONTINUED | OUTPATIENT
Start: 2025-05-25 | End: 2025-05-28 | Stop reason: HOSPADM

## 2025-05-25 RX ORDER — GABAPENTIN 300 MG/1
300 CAPSULE ORAL EVERY 8 HOURS
Status: DISCONTINUED | OUTPATIENT
Start: 2025-05-25 | End: 2025-05-28 | Stop reason: HOSPADM

## 2025-05-25 RX ORDER — SODIUM CHLORIDE 9 MG/ML
INJECTION, SOLUTION INTRAVENOUS PRN
Status: DISCONTINUED | OUTPATIENT
Start: 2025-05-25 | End: 2025-05-28 | Stop reason: HOSPADM

## 2025-05-25 RX ORDER — PHENOBARBITAL 32.4 MG/1
32.4 TABLET ORAL 3 TIMES DAILY
Status: DISCONTINUED | OUTPATIENT
Start: 2025-05-30 | End: 2025-05-28 | Stop reason: HOSPADM

## 2025-05-25 RX ORDER — LANOLIN ALCOHOL/MO/W.PET/CERES
100 CREAM (GRAM) TOPICAL DAILY
Status: COMPLETED | OUTPATIENT
Start: 2025-05-26 | End: 2025-05-28

## 2025-05-25 RX ADMIN — OXYCODONE 5 MG: 5 TABLET ORAL at 23:44

## 2025-05-25 RX ADMIN — SODIUM CHLORIDE, PRESERVATIVE FREE 10 ML: 5 INJECTION INTRAVENOUS at 23:24

## 2025-05-25 RX ADMIN — CLOBETASOL PROPIONATE: 0.5 OINTMENT TOPICAL at 23:40

## 2025-05-25 RX ADMIN — PIPERACILLIN AND TAZOBACTAM 3375 MG: 3; .375 INJECTION, POWDER, LYOPHILIZED, FOR SOLUTION INTRAVENOUS at 19:56

## 2025-05-25 RX ADMIN — GABAPENTIN 300 MG: 300 CAPSULE ORAL at 23:21

## 2025-05-25 RX ADMIN — TACROLIMUS: 1 OINTMENT TOPICAL at 23:39

## 2025-05-25 RX ADMIN — MORPHINE SULFATE 4 MG: 4 INJECTION INTRAVENOUS at 20:20

## 2025-05-25 RX ADMIN — SODIUM CHLORIDE, SODIUM LACTATE, POTASSIUM CHLORIDE, AND CALCIUM CHLORIDE: .6; .31; .03; .02 INJECTION, SOLUTION INTRAVENOUS at 23:26

## 2025-05-25 RX ADMIN — PHENOBARBITAL 129.6 MG: 32.4 TABLET ORAL at 23:20

## 2025-05-25 RX ADMIN — LOSARTAN POTASSIUM 50 MG: 50 TABLET, FILM COATED ORAL at 23:21

## 2025-05-25 RX ADMIN — METHOCARBAMOL 750 MG: 750 TABLET ORAL at 23:21

## 2025-05-25 RX ADMIN — TETANUS TOXOID, REDUCED DIPHTHERIA TOXOID AND ACELLULAR PERTUSSIS VACCINE, ADSORBED 0.5 ML: 5; 2.5; 8; 8; 2.5 SUSPENSION INTRAMUSCULAR at 19:21

## 2025-05-25 ASSESSMENT — PAIN SCALES - GENERAL
PAINLEVEL_OUTOF10: 8
PAINLEVEL_OUTOF10: 7
PAINLEVEL_OUTOF10: 10
PAINLEVEL_OUTOF10: 10

## 2025-05-25 ASSESSMENT — PAIN DESCRIPTION - ORIENTATION
ORIENTATION: RIGHT

## 2025-05-25 ASSESSMENT — PAIN DESCRIPTION - LOCATION
LOCATION: ARM

## 2025-05-25 NOTE — ED PROVIDER NOTES
Note Started: 7:56 PM EDT         [facility]     Emergency Department     Faculty Attestation    I performed a history and physical examination of the patient and discussed management with the resident. I reviewed the resident’s note and agree with the documented findings and plan of care. Any areas of disagreement are noted on the chart. I was personally present for the key portions of any procedures. I have documented in the chart those procedures where I was not present during the key portions. I have reviewed the emergency nurses triage note. I agree with the chief complaint, past medical history, past surgical history, allergies, medications, social and family history as documented unless otherwise noted below.        For Physician Assistant/ Nurse Practitioner cases/documentation I have personally evaluated this patient and have completed at least one if not all key elements of the E/M (history, physical exam, and MDM). Additional findings are as noted.  I have personally seen and evaluated the patient.  I find the patient's history and physical exam are consistent with the NP/PA documentation.  I agree with the care provided, treatment rendered, disposition and follow-up plan.    50-year-old female, left-hand-dominant presenting with dog bite to the right forearm.  Unknown dog.  Patient is intoxicated.  States that attack happened while walking to the store. No other injuries.     Exam:  General : Laying on the bed and awake, alert  CV : normal rate and regular rhythm  Lungs : Breathing comfortably on room air with no tachypnea, hypoxia, or increased work of breathing  Extremities: Gaping wound with avulsed tissue on the right forearm.  Normal flexion and extension of the fingers and wrist.  No arterial bleeding        DDx: Dog bite with extensive soft tissue avulsion    Plan:  X-ray radius ulna  Orthopedics consult  Pain control, antibiotics, update tetanus    Medical Decision

## 2025-05-26 ENCOUNTER — ANESTHESIA (OUTPATIENT)
Dept: OPERATING ROOM | Age: 50
DRG: 581 | End: 2025-05-26
Payer: MEDICAID

## 2025-05-26 ENCOUNTER — ANESTHESIA EVENT (OUTPATIENT)
Dept: OPERATING ROOM | Age: 50
DRG: 581 | End: 2025-05-26
Payer: MEDICAID

## 2025-05-26 LAB
ABO + RH BLD: NORMAL
ALBUMIN SERPL-MCNC: 3.4 G/DL (ref 3.5–5.2)
ALBUMIN/GLOB SERPL: 1.5 {RATIO} (ref 1–2.5)
ALP SERPL-CCNC: 61 U/L (ref 35–104)
ALT SERPL-CCNC: 13 U/L (ref 10–35)
AMMONIA PLAS-SCNC: 43 UMOL/L (ref 11–51)
ANION GAP SERPL CALCULATED.3IONS-SCNC: 10 MMOL/L (ref 9–16)
ARM BAND NUMBER: NORMAL
AST SERPL-CCNC: 21 U/L (ref 10–35)
BASOPHILS # BLD: 0.03 K/UL (ref 0–0.2)
BASOPHILS NFR BLD: 0 % (ref 0–2)
BILIRUB DIRECT SERPL-MCNC: 0.2 MG/DL (ref 0–0.2)
BILIRUB INDIRECT SERPL-MCNC: 0.2 MG/DL (ref 0–1)
BILIRUB SERPL-MCNC: 0.4 MG/DL (ref 0–1.2)
BLOOD BANK SAMPLE EXPIRATION: NORMAL
BLOOD GROUP ANTIBODIES SERPL: NEGATIVE
BUN SERPL-MCNC: 11 MG/DL (ref 6–20)
CALCIUM SERPL-MCNC: 8.8 MG/DL (ref 8.6–10.4)
CHLORIDE SERPL-SCNC: 102 MMOL/L (ref 98–107)
CO2 SERPL-SCNC: 25 MMOL/L (ref 20–31)
CREAT SERPL-MCNC: 0.7 MG/DL (ref 0.6–0.9)
EOSINOPHIL # BLD: 0.12 K/UL (ref 0–0.44)
EOSINOPHILS RELATIVE PERCENT: 2 % (ref 1–4)
ERYTHROCYTE [DISTWIDTH] IN BLOOD BY AUTOMATED COUNT: 14.6 % (ref 11.8–14.4)
GFR, ESTIMATED: >90 ML/MIN/1.73M2
GLOBULIN SER CALC-MCNC: 2.3 G/DL
GLUCOSE BLD-MCNC: 110 MG/DL (ref 65–105)
GLUCOSE BLD-MCNC: 200 MG/DL (ref 65–105)
GLUCOSE BLD-MCNC: 88 MG/DL (ref 65–105)
GLUCOSE BLD-MCNC: 99 MG/DL (ref 65–105)
GLUCOSE SERPL-MCNC: 89 MG/DL (ref 74–99)
HCT VFR BLD AUTO: 38.7 % (ref 36.3–47.1)
HGB BLD-MCNC: 12.7 G/DL (ref 11.9–15.1)
IMM GRANULOCYTES # BLD AUTO: <0.03 K/UL (ref 0–0.3)
IMM GRANULOCYTES NFR BLD: 0 %
LYMPHOCYTES NFR BLD: 2.67 K/UL (ref 1.1–3.7)
LYMPHOCYTES RELATIVE PERCENT: 34 % (ref 24–43)
MAGNESIUM SERPL-MCNC: 2 MG/DL (ref 1.6–2.6)
MCH RBC QN AUTO: 31.6 PG (ref 25.2–33.5)
MCHC RBC AUTO-ENTMCNC: 32.8 G/DL (ref 28.4–34.8)
MCV RBC AUTO: 96.3 FL (ref 82.6–102.9)
MONOCYTES NFR BLD: 0.54 K/UL (ref 0.1–1.2)
MONOCYTES NFR BLD: 7 % (ref 3–12)
NEUTROPHILS NFR BLD: 57 % (ref 36–65)
NEUTS SEG NFR BLD: 4.42 K/UL (ref 1.5–8.1)
NRBC BLD-RTO: 0 PER 100 WBC
PHOSPHATE SERPL-MCNC: 4.2 MG/DL (ref 2.5–4.5)
PLATELET # BLD AUTO: 322 K/UL (ref 138–453)
PMV BLD AUTO: 9.2 FL (ref 8.1–13.5)
POTASSIUM SERPL-SCNC: 4.8 MMOL/L (ref 3.7–5.3)
PROT SERPL-MCNC: 5.7 G/DL (ref 6.6–8.7)
RBC # BLD AUTO: 4.02 M/UL (ref 3.95–5.11)
RBC # BLD: ABNORMAL 10*6/UL
SODIUM SERPL-SCNC: 137 MMOL/L (ref 136–145)
WBC OTHER # BLD: 7.8 K/UL (ref 3.5–11.3)

## 2025-05-26 PROCEDURE — 3600000003 HC SURGERY LEVEL 3 BASE: Performed by: ORTHOPAEDIC SURGERY

## 2025-05-26 PROCEDURE — 3600000013 HC SURGERY LEVEL 3 ADDTL 15MIN: Performed by: ORTHOPAEDIC SURGERY

## 2025-05-26 PROCEDURE — 2580000003 HC RX 258: Performed by: NURSE ANESTHETIST, CERTIFIED REGISTERED

## 2025-05-26 PROCEDURE — 2500000003 HC RX 250 WO HCPCS: Performed by: NURSE ANESTHETIST, CERTIFIED REGISTERED

## 2025-05-26 PROCEDURE — 85025 COMPLETE CBC W/AUTO DIFF WBC: CPT

## 2025-05-26 PROCEDURE — 6360000002 HC RX W HCPCS

## 2025-05-26 PROCEDURE — 2580000003 HC RX 258

## 2025-05-26 PROCEDURE — 7100000000 HC PACU RECOVERY - FIRST 15 MIN: Performed by: ORTHOPAEDIC SURGERY

## 2025-05-26 PROCEDURE — 6370000000 HC RX 637 (ALT 250 FOR IP): Performed by: STUDENT IN AN ORGANIZED HEALTH CARE EDUCATION/TRAINING PROGRAM

## 2025-05-26 PROCEDURE — 83735 ASSAY OF MAGNESIUM: CPT

## 2025-05-26 PROCEDURE — 6360000002 HC RX W HCPCS: Performed by: NURSE ANESTHETIST, CERTIFIED REGISTERED

## 2025-05-26 PROCEDURE — 36415 COLL VENOUS BLD VENIPUNCTURE: CPT

## 2025-05-26 PROCEDURE — 6370000000 HC RX 637 (ALT 250 FOR IP)

## 2025-05-26 PROCEDURE — 80076 HEPATIC FUNCTION PANEL: CPT

## 2025-05-26 PROCEDURE — 86850 RBC ANTIBODY SCREEN: CPT

## 2025-05-26 PROCEDURE — 7100000001 HC PACU RECOVERY - ADDTL 15 MIN: Performed by: ORTHOPAEDIC SURGERY

## 2025-05-26 PROCEDURE — 2500000003 HC RX 250 WO HCPCS: Performed by: ORTHOPAEDIC SURGERY

## 2025-05-26 PROCEDURE — 84100 ASSAY OF PHOSPHORUS: CPT

## 2025-05-26 PROCEDURE — 3700000001 HC ADD 15 MINUTES (ANESTHESIA): Performed by: ORTHOPAEDIC SURGERY

## 2025-05-26 PROCEDURE — 0KB90ZZ EXCISION OF RIGHT LOWER ARM AND WRIST MUSCLE, OPEN APPROACH: ICD-10-PCS | Performed by: ORTHOPAEDIC SURGERY

## 2025-05-26 PROCEDURE — 2580000003 HC RX 258: Performed by: STUDENT IN AN ORGANIZED HEALTH CARE EDUCATION/TRAINING PROGRAM

## 2025-05-26 PROCEDURE — 86900 BLOOD TYPING SEROLOGIC ABO: CPT

## 2025-05-26 PROCEDURE — 3700000000 HC ANESTHESIA ATTENDED CARE: Performed by: ORTHOPAEDIC SURGERY

## 2025-05-26 PROCEDURE — 2709999900 HC NON-CHARGEABLE SUPPLY: Performed by: ORTHOPAEDIC SURGERY

## 2025-05-26 PROCEDURE — 82140 ASSAY OF AMMONIA: CPT

## 2025-05-26 PROCEDURE — 86901 BLOOD TYPING SEROLOGIC RH(D): CPT

## 2025-05-26 PROCEDURE — 1200000000 HC SEMI PRIVATE

## 2025-05-26 PROCEDURE — 82947 ASSAY GLUCOSE BLOOD QUANT: CPT

## 2025-05-26 PROCEDURE — 80048 BASIC METABOLIC PNL TOTAL CA: CPT

## 2025-05-26 PROCEDURE — 2W1CX6Z COMPRESSION OF RIGHT LOWER ARM USING PRESSURE DRESSING: ICD-10-PCS | Performed by: ORTHOPAEDIC SURGERY

## 2025-05-26 RX ORDER — NALOXONE HYDROCHLORIDE 0.4 MG/ML
INJECTION, SOLUTION INTRAMUSCULAR; INTRAVENOUS; SUBCUTANEOUS PRN
Status: DISCONTINUED | OUTPATIENT
Start: 2025-05-26 | End: 2025-05-26 | Stop reason: HOSPADM

## 2025-05-26 RX ORDER — PROPOFOL 10 MG/ML
INJECTION, EMULSION INTRAVENOUS
Status: DISCONTINUED | OUTPATIENT
Start: 2025-05-26 | End: 2025-05-26 | Stop reason: SDUPTHER

## 2025-05-26 RX ORDER — INSULIN LISPRO 100 [IU]/ML
0-16 INJECTION, SOLUTION INTRAVENOUS; SUBCUTANEOUS EVERY 4 HOURS
Status: DISCONTINUED | OUTPATIENT
Start: 2025-05-26 | End: 2025-05-26

## 2025-05-26 RX ORDER — SODIUM CHLORIDE 0.9 % (FLUSH) 0.9 %
5-40 SYRINGE (ML) INJECTION EVERY 12 HOURS SCHEDULED
Status: DISCONTINUED | OUTPATIENT
Start: 2025-05-26 | End: 2025-05-26 | Stop reason: HOSPADM

## 2025-05-26 RX ORDER — SODIUM CHLORIDE 9 MG/ML
INJECTION, SOLUTION INTRAVENOUS PRN
Status: DISCONTINUED | OUTPATIENT
Start: 2025-05-26 | End: 2025-05-26 | Stop reason: HOSPADM

## 2025-05-26 RX ORDER — INSULIN LISPRO 100 [IU]/ML
0-16 INJECTION, SOLUTION INTRAVENOUS; SUBCUTANEOUS
Status: DISCONTINUED | OUTPATIENT
Start: 2025-05-26 | End: 2025-05-28 | Stop reason: HOSPADM

## 2025-05-26 RX ORDER — ROCURONIUM BROMIDE 10 MG/ML
INJECTION, SOLUTION INTRAVENOUS
Status: DISCONTINUED | OUTPATIENT
Start: 2025-05-26 | End: 2025-05-26 | Stop reason: SDUPTHER

## 2025-05-26 RX ORDER — ONDANSETRON 2 MG/ML
INJECTION INTRAMUSCULAR; INTRAVENOUS
Status: DISCONTINUED | OUTPATIENT
Start: 2025-05-26 | End: 2025-05-26 | Stop reason: SDUPTHER

## 2025-05-26 RX ORDER — LIDOCAINE HYDROCHLORIDE 10 MG/ML
INJECTION, SOLUTION EPIDURAL; INFILTRATION; INTRACAUDAL; PERINEURAL
Status: DISCONTINUED | OUTPATIENT
Start: 2025-05-26 | End: 2025-05-26 | Stop reason: SDUPTHER

## 2025-05-26 RX ORDER — DEXAMETHASONE SODIUM PHOSPHATE 10 MG/ML
INJECTION, SOLUTION INTRA-ARTICULAR; INTRALESIONAL; INTRAMUSCULAR; INTRAVENOUS; SOFT TISSUE
Status: DISCONTINUED | OUTPATIENT
Start: 2025-05-26 | End: 2025-05-26 | Stop reason: SDUPTHER

## 2025-05-26 RX ORDER — SODIUM CHLORIDE 0.9 % (FLUSH) 0.9 %
5-40 SYRINGE (ML) INJECTION PRN
Status: DISCONTINUED | OUTPATIENT
Start: 2025-05-26 | End: 2025-05-26 | Stop reason: HOSPADM

## 2025-05-26 RX ORDER — SODIUM CHLORIDE, SODIUM LACTATE, POTASSIUM CHLORIDE, CALCIUM CHLORIDE 600; 310; 30; 20 MG/100ML; MG/100ML; MG/100ML; MG/100ML
INJECTION, SOLUTION INTRAVENOUS
Status: DISCONTINUED | OUTPATIENT
Start: 2025-05-26 | End: 2025-05-26 | Stop reason: SDUPTHER

## 2025-05-26 RX ORDER — DEXTROSE MONOHYDRATE 100 MG/ML
INJECTION, SOLUTION INTRAVENOUS CONTINUOUS PRN
Status: DISCONTINUED | OUTPATIENT
Start: 2025-05-26 | End: 2025-05-28 | Stop reason: HOSPADM

## 2025-05-26 RX ORDER — FENTANYL CITRATE 50 UG/ML
25 INJECTION, SOLUTION INTRAMUSCULAR; INTRAVENOUS EVERY 5 MIN PRN
Status: DISCONTINUED | OUTPATIENT
Start: 2025-05-26 | End: 2025-05-26 | Stop reason: HOSPADM

## 2025-05-26 RX ORDER — ONDANSETRON 2 MG/ML
4 INJECTION INTRAMUSCULAR; INTRAVENOUS
Status: DISCONTINUED | OUTPATIENT
Start: 2025-05-26 | End: 2025-05-26 | Stop reason: HOSPADM

## 2025-05-26 RX ORDER — MIDAZOLAM HYDROCHLORIDE 1 MG/ML
INJECTION, SOLUTION INTRAMUSCULAR; INTRAVENOUS
Status: DISCONTINUED | OUTPATIENT
Start: 2025-05-26 | End: 2025-05-26 | Stop reason: SDUPTHER

## 2025-05-26 RX ORDER — FENTANYL CITRATE 50 UG/ML
INJECTION, SOLUTION INTRAMUSCULAR; INTRAVENOUS
Status: DISCONTINUED | OUTPATIENT
Start: 2025-05-26 | End: 2025-05-26 | Stop reason: SDUPTHER

## 2025-05-26 RX ORDER — GLUCAGON 1 MG/ML
1 KIT INJECTION PRN
Status: DISCONTINUED | OUTPATIENT
Start: 2025-05-26 | End: 2025-05-28 | Stop reason: HOSPADM

## 2025-05-26 RX ORDER — FENTANYL CITRATE 50 UG/ML
50 INJECTION, SOLUTION INTRAMUSCULAR; INTRAVENOUS EVERY 5 MIN PRN
Status: DISCONTINUED | OUTPATIENT
Start: 2025-05-26 | End: 2025-05-26 | Stop reason: HOSPADM

## 2025-05-26 RX ORDER — ACETAMINOPHEN 500 MG
1000 TABLET ORAL EVERY 8 HOURS SCHEDULED
Status: DISCONTINUED | OUTPATIENT
Start: 2025-05-26 | End: 2025-05-28 | Stop reason: HOSPADM

## 2025-05-26 RX ORDER — MAGNESIUM HYDROXIDE 1200 MG/15ML
LIQUID ORAL CONTINUOUS PRN
Status: DISCONTINUED | OUTPATIENT
Start: 2025-05-26 | End: 2025-05-26 | Stop reason: HOSPADM

## 2025-05-26 RX ADMIN — PHENOBARBITAL 97.2 MG: 32.4 TABLET ORAL at 20:51

## 2025-05-26 RX ADMIN — DEXAMETHASONE SODIUM PHOSPHATE 5 MG: 10 INJECTION INTRAMUSCULAR; INTRAVENOUS at 11:50

## 2025-05-26 RX ADMIN — SODIUM CHLORIDE: 0.9 INJECTION, SOLUTION INTRAVENOUS at 20:02

## 2025-05-26 RX ADMIN — FOLIC ACID 1 MG: 1 TABLET ORAL at 14:02

## 2025-05-26 RX ADMIN — POLYETHYLENE GLYCOL 3350 17 G: 17 POWDER, FOR SOLUTION ORAL at 14:03

## 2025-05-26 RX ADMIN — OXYCODONE 5 MG: 5 TABLET ORAL at 22:01

## 2025-05-26 RX ADMIN — ROCURONIUM BROMIDE 50 MG: 10 INJECTION, SOLUTION INTRAVENOUS at 11:41

## 2025-05-26 RX ADMIN — OXYCODONE 5 MG: 5 TABLET ORAL at 08:07

## 2025-05-26 RX ADMIN — MIDAZOLAM 2 MG: 1 INJECTION INTRAMUSCULAR; INTRAVENOUS at 11:38

## 2025-05-26 RX ADMIN — SENNOSIDES AND DOCUSATE SODIUM 1 TABLET: 50; 8.6 TABLET ORAL at 20:53

## 2025-05-26 RX ADMIN — METHOCARBAMOL 750 MG: 750 TABLET ORAL at 16:53

## 2025-05-26 RX ADMIN — CETIRIZINE HYDROCHLORIDE 10 MG: 10 TABLET, FILM COATED ORAL at 14:03

## 2025-05-26 RX ADMIN — PIPERACILLIN AND TAZOBACTAM 3375 MG: 3; .375 INJECTION, POWDER, LYOPHILIZED, FOR SOLUTION INTRAVENOUS at 20:02

## 2025-05-26 RX ADMIN — SODIUM CHLORIDE, POTASSIUM CHLORIDE, SODIUM LACTATE AND CALCIUM CHLORIDE: 600; 310; 30; 20 INJECTION, SOLUTION INTRAVENOUS at 11:34

## 2025-05-26 RX ADMIN — ACETAMINOPHEN 1000 MG: 500 TABLET, FILM COATED ORAL at 14:02

## 2025-05-26 RX ADMIN — TACROLIMUS: 1 OINTMENT TOPICAL at 21:06

## 2025-05-26 RX ADMIN — CLOBETASOL PROPIONATE: 0.5 OINTMENT TOPICAL at 21:04

## 2025-05-26 RX ADMIN — GABAPENTIN 300 MG: 300 CAPSULE ORAL at 16:53

## 2025-05-26 RX ADMIN — LOSARTAN POTASSIUM 50 MG: 50 TABLET, FILM COATED ORAL at 08:06

## 2025-05-26 RX ADMIN — PHENOBARBITAL 97.2 MG: 32.4 TABLET ORAL at 14:15

## 2025-05-26 RX ADMIN — PREDNISONE 30 MG: 20 TABLET ORAL at 14:03

## 2025-05-26 RX ADMIN — ACETAMINOPHEN 1000 MG: 500 TABLET, FILM COATED ORAL at 22:01

## 2025-05-26 RX ADMIN — SODIUM CHLORIDE: 0.9 INJECTION, SOLUTION INTRAVENOUS at 03:54

## 2025-05-26 RX ADMIN — CLOBETASOL PROPIONATE: 0.5 OINTMENT TOPICAL at 14:14

## 2025-05-26 RX ADMIN — PIPERACILLIN AND TAZOBACTAM 3375 MG: 3; .375 INJECTION, POWDER, LYOPHILIZED, FOR SOLUTION INTRAVENOUS at 03:56

## 2025-05-26 RX ADMIN — METHOCARBAMOL 750 MG: 750 TABLET ORAL at 20:51

## 2025-05-26 RX ADMIN — METHOCARBAMOL 750 MG: 750 TABLET ORAL at 08:06

## 2025-05-26 RX ADMIN — ONDANSETRON 4 MG: 2 INJECTION INTRAMUSCULAR; INTRAVENOUS at 12:03

## 2025-05-26 RX ADMIN — ENOXAPARIN SODIUM 40 MG: 100 INJECTION SUBCUTANEOUS at 20:51

## 2025-05-26 RX ADMIN — INSULIN LISPRO 4 UNITS: 100 INJECTION, SOLUTION INTRAVENOUS; SUBCUTANEOUS at 17:39

## 2025-05-26 RX ADMIN — ENOXAPARIN SODIUM 40 MG: 100 INJECTION SUBCUTANEOUS at 14:14

## 2025-05-26 RX ADMIN — SENNOSIDES AND DOCUSATE SODIUM 1 TABLET: 50; 8.6 TABLET ORAL at 14:02

## 2025-05-26 RX ADMIN — FENTANYL CITRATE 100 MCG: 50 INJECTION, SOLUTION INTRAMUSCULAR; INTRAVENOUS at 11:41

## 2025-05-26 RX ADMIN — Medication 100 MG: at 14:02

## 2025-05-26 RX ADMIN — PROPOFOL 200 MG: 10 INJECTION, EMULSION INTRAVENOUS at 11:41

## 2025-05-26 RX ADMIN — LIDOCAINE HYDROCHLORIDE 50 MG: 10 INJECTION, SOLUTION EPIDURAL; INFILTRATION; INTRACAUDAL; PERINEURAL at 11:41

## 2025-05-26 RX ADMIN — SUGAMMADEX 200 MG: 100 INJECTION, SOLUTION INTRAVENOUS at 12:06

## 2025-05-26 RX ADMIN — GABAPENTIN 300 MG: 300 CAPSULE ORAL at 08:06

## 2025-05-26 RX ADMIN — THERA TABS 1 TABLET: TAB at 14:03

## 2025-05-26 ASSESSMENT — PAIN DESCRIPTION - LOCATION
LOCATION: ARM;BACK;SHOULDER
LOCATION: ARM

## 2025-05-26 ASSESSMENT — PAIN SCALES - GENERAL
PAINLEVEL_OUTOF10: 7
PAINLEVEL_OUTOF10: 5
PAINLEVEL_OUTOF10: 7

## 2025-05-26 ASSESSMENT — PAIN DESCRIPTION - ORIENTATION: ORIENTATION: RIGHT

## 2025-05-26 NOTE — BRIEF OP NOTE
Brief Postoperative Note      Patient: Lisa Coppola  YOB: 1975  MRN: [id number]    Date of Procedure: 5/26/2025    Pre-Op Diagnosis Codes:    1. Right forearm full thickness wound 10x8cm, down to muscle    Post-Op Diagnosis: Same       Procedure(s):  Right forearm irrigation and debridement 10x8cm  Application of wound vac to right arm    Surgeon(s):  Juan Miguel Méndez MD    Assistant:  Resident: Ranulfo Crystal DO; Monster Hennessy DO    Anesthesia: General    Estimated Blood Loss (mL): 5 cc    TT: 11 mins    Fluids: 500 cc crystalloid.    Complications: None    Specimens:   * No specimens in log *    Implants:  * No implants in log *      Drains: * No LDAs found *    Findings:  Infection Present At Time Of Surgery (PATOS) (choose all levels that have infection present):  No infection present  Other Findings: Right forearm wound down to muscle 10 x 8 cm.    Electronically signed by Monster Hennessy DO on 5/26/2025 at 12:15 PM

## 2025-05-26 NOTE — H&P
TRAUMA H&P/CONSULT    PATIENT NAME: Lisa Coppola  YOB: 1975  MEDICAL RECORD NO. [id number]   DATE: 5/25/2025  PRIMARY CARE PHYSICIAN: No primary care provider on file.  PATIENT EVALUATED AT THE REQUEST OF : Amy FRY   Trauma Consult-Time at bedside 2045      IMPRESSION AND PLAN:     Dog bite right forearm    Review imaging    S/p clean out by ortho- ortho plans for formal washout in OR tomorrow    Admit to trauma service     Hx of daily alcohol use w/ withdrawal sx   Phenobarb taper, monitor for signs/sx of withdrawal     Resume home meds   Regular diet- NPO at MN   F/u trauma labs     If intracranial hemorrhage is present, is it a:  [] BIG 1  [] BIG 2  [] BIG 3- n/a   If chest wall injury: Rib score- n/a    CONSULT SERVICES    Orthopedic Surgery      HISTORY:     Chief Complaint:  \"Dog bite to arm \"    GENERAL DATA  Patient information was obtained from patient.  History/Exam limitations: none.  Injury Date: 5/25/25   Approximate Injury Time: ~1330        Transport mode: Ambulance  Referring Hospital: n/a    SETTING OF TRAUMATIC EVENT   Location : Street  Specific Details of Location: Other: on side walk    MECHANISM OF INJURY    Other: dog bite     HISTORY:     Lias Coppola is a female that presented to the Emergency Department following dog bite to right arm. She states she was walking to the store to buy beer when a dog came out of nowhere and bit her on her arm. She has never seen this dog before and believes it to be a stray. She notes that the dog bit her arm and did not bite any other part of her. She did not fall or get knocked to the ground. She denies any LOC. She does not take any blood thinners. Is on a prednisone taper 30mg daily (on day 3). She also reports blood pressure medication and takes inhalers for COPD/ asthma. She endorses daily drinking and has had issues with withdrawal in the past. She denies any other pain or concerns.     Traumatic loss of

## 2025-05-26 NOTE — PLAN OF CARE
Problem: Pain  Goal: Verbalizes/displays adequate comfort level or baseline comfort level  5/26/2025 1809 by Rachael Cesar RN  Outcome: Progressing  5/26/2025 0414 by Lacy Washington RN  Outcome: Progressing  5/26/2025 0414 by Lacy Washington RN  Outcome: Progressing     Problem: Safety - Adult  Goal: Free from fall injury  5/26/2025 1809 by Rachael Cesar RN  Outcome: Progressing  5/26/2025 0414 by Lacy Washington RN  Outcome: Progressing     Problem: Discharge Planning  Goal: Discharge to home or other facility with appropriate resources  Outcome: Progressing

## 2025-05-26 NOTE — ED PROVIDER NOTES
[facility] EMERGENCY DEPARTMENT  Emergency Department Encounter  Emergency Medicine Resident     Pt Name:Lisa Coppola  MRN: [id number]  Birthdate 1975  Date of evaluation: 5/25/25  PCP:  No primary care provider on file.  Note Started: 8:35 PM EDT      CHIEF COMPLAINT       Chief Complaint   Patient presents with    Animal Bite       HISTORY OF PRESENT ILLNESS  (Location/Symptom, Timing/Onset, Context/Setting, Quality, Duration, Modifying Factors, Severity.)      Lisa Coppola is a 50 y.o. female who presents with dog bite to the right forearm.  Patient states the dog bite happened shortly before arrival.  Patient was brought in by EMS, is intoxicated.  Stated that she drank approximately 6 tall boys prior to arrival.  Patient does endorse pain in the right forearm but denies any numbness or tingling.  Is able to move the digits of the right hand.  Patient received fentanyl by EMS prior to arrival.  Patient is unsure who his dog bit her.    PAST MEDICAL / SURGICAL / SOCIAL / FAMILY HISTORY      has a past medical history of Asthma, Carpal tunnel syndrome, Chronic kidney disease, COPD (chronic obstructive pulmonary disease) (HCC), Hypertension, and Liver disease.       has a past surgical history that includes pr excision bone mandible (N/A, 3/17/2017); laryngoscopy (06/17/2019); and laryngoscopy (Bilateral, 6/17/2019).      Social History     Socioeconomic History    Marital status:      Spouse name: Not on file    Number of children: Not on file    Years of education: Not on file    Highest education level: Not on file   Occupational History    Not on file   Tobacco Use    Smoking status: Every Day     Current packs/day: 0.50     Average packs/day: 0.5 packs/day for 15.0 years (7.5 ttl pk-yrs)     Types: Cigarettes    Smokeless tobacco: Never    Tobacco comments:     gum ordered   Vaping Use    Vaping status: Never Used   Substance and Sexual Activity    Alcohol use: Not Currently

## 2025-05-26 NOTE — ANESTHESIA POSTPROCEDURE EVALUATION
Department of Anesthesiology  Postprocedure Note    Patient: Lisa Coppola  MRN: [id number]  YOB: 1975  Date of evaluation: 5/26/2025    Procedure Summary       Date: 05/26/25 Room / Location: [place] [address] / [facility]    Anesthesia Start: 1134 Anesthesia Stop: 1220    Procedure: FOREARM IRRIGATION AND DEBRIDEMENT,AND WOUND VAC PLACEMENT (Right) Diagnosis:       Dog bite of right arm, initial encounter      (Dog bite of right arm, initial encounter [S41.151A, W54.0XXA])    Surgeons: Juan Miguel Méndez MD Responsible Provider: Wilfrid Davey MD    Anesthesia Type: general ASA Status: 3            Anesthesia Type: No value filed.    Eufemia Phase I: Eufemia Score: 10    Eufemia Phase II:    POST-OP ANESTHESIA NOTE       BP (!) 150/91   Pulse 60   Temp 98.4 °F (36.9 °C) (Oral)   Resp 16   Ht 1.524 m (5')   Wt 81.4 kg (179 lb 7.3 oz)   SpO2 93%   BMI 35.05 kg/m²    Pain Assessment: None - Denies Pain  Pain Level: 7       Anesthesia Post Evaluation    Patient location during evaluation: PACU  Patient participation: complete - patient participated  Level of consciousness: awake  Pain score: 7  Airway patency: patent  Nausea & Vomiting: no vomiting and no nausea  Cardiovascular status: hemodynamically stable  Respiratory status: acceptable  Hydration status: stable  Pain management: adequate        No notable events documented.

## 2025-05-26 NOTE — PROGRESS NOTES
RCRI 0 - no need for additional cardiac w/u at this time        Electronically signed by Josue Huang MD on 5/25/2025 at 9:15 PM

## 2025-05-26 NOTE — CONSULTS
Types: Cigarettes    Smokeless tobacco: Never    Tobacco comments:     gum ordered   Vaping Use    Vaping status: Never Used   Substance and Sexual Activity    Alcohol use: Not Currently     Comment: 6-8 cans of beer per day    Drug use: Yes     Types: Marijuana (Weed)     Social Drivers of Health     Food Insecurity: No Food Insecurity (3/11/2024)    [facility]    Hunger Screening     Within the past 12 months we worried whether our food would run out before we got money to buy more.: Never True     Within the past 12 months the food we bought just didn't last and we didn't have money to get more.: Never True     Family History:  Family History   Problem Relation Age of Onset    Cancer Mother     Cancer Sister     Colon Cancer Nephew        ROS:   Constitutional: Negative for fever and chills.   Respiratory: Negative for cough.    Cardiovascular: Negative for chest pain.   Musculoskeletal: Per HPI.   Skin: Negative for itching and rash.   Neurological: Negative for numbness, tingling, weakness.     PE:  Blood pressure (!) 154/97, pulse 71, temperature 98.2 °F (36.8 °C), temperature source Oral, resp. rate 18, height 1.524 m (5'), weight 81.4 kg (179 lb 7.3 oz), SpO2 97%.    Gen: Alert and oriented, NAD, cooperative.    Head: Normocephalic, atraumatic.    Cardiovascular: Regular rate.    Respiratory: Chest symmetric, no accessory muscle use, breathing comfortably on room air.    RUE: Significant soft tissue injury at the dorsal lateral aspect of the proximal right forearm with loss of skin and subcutaneous tissue.  Additional scattered dog bites at the proximal dorsal lateral aspect of the right forearm.  Also can be seen, and there is a small area of muscle that does have a bite leslie but no avulsions noted.  Patient able to flex and extend all digits and contraction of the muscle bellies seen.  Tender to palpation about soft tissue injury.  Full range of

## 2025-05-26 NOTE — PROGRESS NOTES
Orthopedic Progress Note    Patient:  Lisa BRYANT Call  YOB: 1975     50 y.o. female    Subjective:  Patient seen and examined this morning. No complaints or concerns. No issues overnight per nursing. Pain is well controlled on current regimen. Denies fever, HA, CP, SOB, N/V, dysuria, new numbness/tingling.  Patient has not yet worked with physical therapy.  Currently on CIWA protocol.  Consent obtained this morning and surgical site marked.    Vitals reviewed, afebrile    Objective:   Vitals:    05/26/25 0014   BP:    Pulse:    Resp: 17   Temp:    SpO2:      Gen: NAD, cooperative    Cardiovascular: Regular rate   Respiratory: No acute respiratory distress, breathing comfortably    Orthopedic Exam  RUE:    Dressings are clean, dry, and intact without strike-through.  Compartments soft and compressible.  Tender to palpation of the right forearm.  AIN/PIN/median/radial//ulnar motor complexes intact.  Median/ulnar/superficial radial nerves with sensation intact to light touch.  Extremity is warm and well-perfused.      Recent Labs     05/25/25 1923 05/25/25 2000 05/26/25  0606   WBC 11.4*  --   --    HGB 13.5  --   --    HCT 39.6  --   --      --   --    NA  --    < > 137   K  --    < > 4.8   BUN  --    < > 11   CREATININE  --    < > 0.7   GLUCOSE  --    < > 89    < > = values in this interval not displayed.      Meds:   Abx: Zosyn  DVT ppx: Lovenox  See rec for complete list    Impression 50 y.o. female being seen for:    - Right forearm soft tissue injury secondary to dog bite     Plan  - Plan for OR with Dr. Méndez 5/27/25.   - Zosyn every 6 hours until 24 hours after definitive operative closure.  - Soft dressings on RUE . Please maintain and keep clean and dry. Reinforce dressings as needed per nursing.  - WBAT to the RUE  - Diet: NPO    - DVT ppx: Okay for chemical AC from orthopedic surgery perspective.  - Consent obtained this a.m., extremity marked.  - Pain control and medical

## 2025-05-26 NOTE — ED NOTES
Ortho at bedside  
Ortho at bedside performing wound wash out and applying wet to dry dressing  
Pt arrives alert and oriented x4 via EMS   Pt complains of R arm pain after getting bit by a dog while walking to the grocery store  EMS reported patient had 6 tallboys before their arrival and they gave 100 fentanyl pta   Pt has a full thickness avulsion with tendon and muscle showing  Pt pulses and sensation intact   RR even and unlabored.   NAD noted.   Whiteboard updated.  Will continue with plan of care.    
The following labs were labeled with appropriate pt sticker and tubed to lab:     [x] Blue     [x] Lavender   [] on ice  [x] Green/yellow  [] Green/black [] on ice  [] Grey  [] on ice  [] Yellow  [] Red  [] Pink  [] Type/ Screen  [] ABG  [] VBG    [] COVID-19 swab    [] Rapid  [] PCR  [] Flu swab  [] Peds Viral Panel     [] Urine Sample  [] Fecal Sample  [] Pelvic Cultures  [] Blood Cultures  [] X 2  [] STREP Cultures  [] Wound Cultures   
Trauma at bedside  
Xray at bedside  
    Social Drivers of Health     Food Insecurity: No Food Insecurity (3/11/2024)    [facility]    Hunger Screening     Within the past 12 months we worried whether our food would run out before we got money to buy more.: Never True     Within the past 12 months the food we bought just didn't last and we didn't have money to get more.: Never True       FAMILY HISTORY       Family History   Problem Relation Age of Onset    Cancer Mother     Cancer Sister     Colon Cancer Nephew        ALLERGIES     Tramadol    CURRENT MEDICATIONS       Previous Medications    CLOBETASOL (TEMOVATE) 0.05 % OINTMENT    Apply to rash twice daily, as needed (not face, armpit or groin)    DOXYCYCLINE HYCLATE (VIBRAMYCIN) 100 MG CAPSULE    Take 1 pill twice daily with food    IBUPROFEN (IBU) 800 MG TABLET    Take 1 tablet by mouth every 8 hours as needed for Pain    TACROLIMUS (PROTOPIC) 0.1 % OINTMENT    Apply to rash twice daily, as needed (Safe for use on face).     Orders Placed This Encounter   Medications    DISCONTD: Tetanus-Diphth-Acell Pertussis (BOOSTRIX) injection 0.5 mL    tetanus-diphth-acell pertussis (BOOSTRIX) injection 0.5 mL    piperacillin-tazobactam (ZOSYN) 3,375 mg in sodium chloride 0.9 % 50 mL IVPB (addEASE)     Antimicrobial Indications:   Skin and Soft Tissue Infection    morphine injection 4 mg       SURGICAL HISTORY       Past Surgical History:   Procedure Laterality Date    LARYNGOSCOPY  06/17/2019    EXCISION OF BILATERAL VOCAL CORD POLYPS     LARYNGOSCOPY Bilateral 6/17/2019    DIRECT LARYNGOSCOPY MICRO WITH EXCISION OF BILATERAL VOCAL CORD POLYPS performed by Sky Walter MD at [facility] OR    [place] EXCISION BONE MANDIBLE N/A 3/17/2017    COMPLEX LACERATION REPAIR WITH DEBRIDEMENT performed by Oliver Zayas DDS at [facility] OR       PAST MEDICAL HISTORY       Past Medical History:   Diagnosis Date    Asthma     Carpal tunnel syndrome     bilateral    Chronic kidney

## 2025-05-26 NOTE — PROGRESS NOTES
PROGRESS NOTE          PATIENT NAME: Lisa Coppola  MEDICAL RECORD NO. [id number]  DATE: 2025  PRIMARY CARE PHYSICIAN: No primary care provider on file.    HD: # 1    ASSESSMENT    Patient Active Problem List   Diagnosis    Dog bite of face    Acute alcohol intoxication    Dog bite of finger    COPD exacerbation (HCC)    Obesity    Essential hypertension    Alcohol abuse    Smoker    Non-compliance    Cigarette nicotine dependence     Lactic acidosis    Bronchitis    Inspiratory stridor    Laryngeal polyp    Dog bite    Depressive disorder    Suicidal ideation    Dog bite of arm, right, initial encounter       MEDICAL DECISION MAKING AND PLAN    Right forearm soft tissue injury secondary to dog bite   Plan for washout with ortho   NPO  MMPT   Zosyn.      SUBJECTIVE    Lisa BRYANT Call is is unchanged since yesterday. Reports 7/10 pain in injured arm. Denies any other pain. She was started on a high dose steroid taper due to severe eczema.      OBJECTIVE  VITALS: Temp: Temp: 98.1 °F (36.7 °C)Temp  Av.3 °F (36.8 °C)  Min: 98.1 °F (36.7 °C)  Max: 98.6 °F (37 °C) BP Systolic (24hrs), Av , Min:129 , Max:156   Diastolic (24hrs), Av, Min:77, Max:97   Pulse Pulse  Av.2  Min: 50  Max: 78 Resp Resp  Av.3  Min: 16  Max: 24 Pulse ox SpO2  Av.8 %  Min: 91 %  Max: 100 %  GENERAL: alert, no distress  NEURO: no focal deficits  HEENT: BRENDA, EOM  : deferred  LUNGS: clear to ausculation, without wheezes, rales or rhonci  HEART: normal rate and regular rhythm  ABDOMEN: soft, non-tender, non-distended, bowel sounds present in all 4 quadrants, and no guarding or peritoneal signs present  EXTREMITY: no cyanosis, clubbing or edema, right arm in dressing and ace wrap    I/O last 3 completed shifts:  In: 499.8 [I.V.:425; IV Piggyback:74.8]  Out: -     Drain/tube output:  In: 499.8 [I.V.:425]  Out: 200 [Urine:200]    LAB:  CBC:   Recent Labs     25  1923 25  0606   WBC 11.4* 7.8   HGB 13.5 12.7

## 2025-05-26 NOTE — PROGRESS NOTES
Trauma Tertiary Survey    Admit Date: 5/25/2025  Hospital day 1      Chief Complaint: \"Dog bite\"    Subjective:       Lisa D Call is is unchanged since yesterday. Reports 7/10 pain in injured arm. Denies any other pain. She was started on a high dose steroid taper due to severe eczema.     Objective:   PHYSICAL EXAM:   Physical Exam  GENERAL: alert, no distress  NEURO: no focal deficits  HEENT: BRENDA, EOM  : deferred  LUNGS: clear to ausculation, without wheezes, rales or rhonci  HEART: normal rate and regular rhythm  ABDOMEN: soft, non-tender, non-distended, bowel sounds present in all 4 quadrants, and no guarding or peritoneal signs present  EXTREMITY: no cyanosis, clubbing or edema, right arm in dressing and ace wrap    Spine:     Spine Tenderness ROM   Cervical 0 /10 Normal   Thoracic 0 /10 Normal   Lumbar 0 /10 Normal     Musculoskeletal    Joint Tenderness Swelling ROM   Right shoulder absent absent normal   Left shoulder absent absent normal   Right elbow absent absent normal   Left elbow absent absent normal   Right wrist absent absent normal   Left wrist absent absent normal   Right hand grasp absent absent normal   Left hand grasp absent absent normal   Right hip absent absent normal   Left hip absent absent normal   Right knee absent absent normal   Left knee absent absent normal   Right ankle absent absent normal   Left ankle absent absent normal   Right foot absent absent normal   Left foot absent absent normal       CONSULTS: none    PROCEDURES: none     [x] Reviewed radiology reports  (All radiology findings correlate to diagnoses/problem list)    [] Incidental findings:    [] Patient/family notified and letter given    Assessment/Plan:      1. Negative tertiary exam

## 2025-05-26 NOTE — DISCHARGE INSTRUCTIONS
Orthopaedic Instructions:  -Weight bearing status: Weight bearing as tolerated with the right arm. Keep wound vac on at all times.  -Always look for signs of compartment syndrome: pain out of proportion to the injury, pain not controlled with pain medication, numbness in digits, changing of color of digits (paleness). If these signs occur return to ED immediately for reassessment.  -Ice (20 minutes on and off 1 hour) and elevate to reduce swelling and throbbing pain.  -Should urinate within 8 hours of surgery.  -Call the office or come to Emergency Room if signs of infection appear (hot, swollen, red, draining pus, fever)  -Take medications as prescribed.  -Wean off narcotics (percocet/norco) as soon as possible. Do not take tylenol if still taking narcotics.  -Follow up with Dr. Méndez in his office 7-10 days after surgery. Call [phone]  to schedule/confirm or with any questions/concerns.

## 2025-05-26 NOTE — ANESTHESIA PRE PROCEDURE
Department of Anesthesiology  Preprocedure Note       Name:  Lisa Coppola   Age:  50 y.o.  :  1975                                          MRN:  [id number]         Date:  2025      Surgeon: Surgeon(s):  Juan Miguel Méndez MD    Procedure: Procedure(s):  FOREARM IRRIGATION AND DEBRIDEMENT,    Medications prior to admission:   Prior to Admission medications    Medication Sig Start Date End Date Taking? Authorizing Provider   losartan (COZAAR) 50 MG tablet Take 1 tablet by mouth daily 25  Yes ProviderAdonis MD   predniSONE (DELTASONE) 10 MG tablet Take 1 tablet by mouth See Admin Instructions sree 3 tabs po daily x 5 days, then 2 tabs daily x 5 days, then 1 tabs daily x 5 days. Then discontinue 25  Yes ProviderAdonis MD   cetirizine (ZYRTEC) 10 MG tablet Take 1 tablet by mouth daily 25  Yes ProviderAdonis MD   albuterol sulfate HFA (PROVENTIL;VENTOLIN;PROAIR) 108 (90 Base) MCG/ACT inhaler Inhale 2 puffs into the lungs every 6 hours as needed 4/3/25  Yes ProviderAdonis MD   clobetasol (TEMOVATE) 0.05 % ointment Apply to rash twice daily, as needed (not face, armpit or groin) 23   Ivan Cruz PA-C   tacrolimus (PROTOPIC) 0.1 % ointment Apply to rash twice daily, as needed (Safe for use on face). 23   Ivan Cruz PA-C   doxycycline hyclate (VIBRAMYCIN) 100 MG capsule Take 1 pill twice daily with food 23   Ivan Cruz PA-C   ibuprofen (IBU) 800 MG tablet Take 1 tablet by mouth every 8 hours as needed for Pain  Patient not taking: Reported on 3/4/2023 10/9/21   Sabine Montoya DO       Current medications:    Current Facility-Administered Medications   Medication Dose Route Frequency Provider Last Rate Last Admin    piperacillin-tazobactam (ZOSYN) 3,375 mg in sodium chloride 0.9 % 50 mL IVPB (addEASE)  3,375 mg IntraVENous Q8H Uli Soto DO   Stopped at 25 0802    acetaminophen (TYLENOL) tablet 1,000 mg  1,000 mg Oral 3 times per day Yumiko,

## 2025-05-26 NOTE — ED PROVIDER NOTES
[facility] EMERGENCY DEPARTMENT  Emergency Department  Emergency Medicine Resident Sign-out   9:56 PM EDT  Care of Lisa Coppola was assumed from Dr. Gore and is being seen for Animal Bite   .  The patient's initial evaluation and plan have been discussed with the prior provider who initially evaluated the patient.     EMERGENCY DEPARTMENT COURSE / MEDICAL DECISION MAKING:       MEDICATIONS GIVEN:  Orders Placed This Encounter   Medications    DISCONTD: Tetanus-Diphth-Acell Pertussis (BOOSTRIX) injection 0.5 mL    tetanus-diphth-acell pertussis (BOOSTRIX) injection 0.5 mL    piperacillin-tazobactam (ZOSYN) 3,375 mg in sodium chloride 0.9 % 50 mL IVPB (addEASE)     Antimicrobial Indications:   Skin and Soft Tissue Infection    morphine injection 4 mg    thiamine tablet 100 mg    folic acid (FOLVITE) tablet 1 mg    multivitamin 1 tablet    FOLLOWED BY Linked Order Group     PHENobarbital tablet 130 mg     PHENobarbital tablet 97.2 mg     PHENobarbital tablet 64.8 mg     PHENobarbital tablet 32.4 mg       LABS / RADIOLOGY:     Labs Reviewed   CBC WITH AUTO DIFFERENTIAL - Abnormal; Notable for the following components:       Result Value    WBC 11.4 (*)     Lymphocytes % 45 (*)     Lymphocytes Absolute 5.13 (*)     Eosinophils Absolute 0.46 (*)     All other components within normal limits   BASIC METABOLIC PANEL - Abnormal; Notable for the following components:    Glucose 113 (*)     All other components within normal limits   PREVIOUS SPECIMEN   ETHANOL       XR RADIUS ULNA LEFT (2 VIEWS)   Final Result   1. No fracture or radiopaque foreign body.   2. Soft tissue gas along the volar surface of the distal forearm, likely   related to penetrating trauma.             RECENT VITALS:     Temp: 98.6 °F (37 °C),  Pulse: 78, Respirations: 20, BP: (!) 151/90, SpO2: 96 %    This patient is a 50 y.o. Female with dog bite to the right forearm.  Muscle involvement, tendons exposed.  Trauma surgery consulted and

## 2025-05-26 NOTE — PLAN OF CARE
Problem: Pain  Goal: Verbalizes/displays adequate comfort level or baseline comfort level  5/26/2025 0414 by Lacy Washington, RN  Outcome: Progressing  5/26/2025 0414 by Lacy Washington, RN  Outcome: Progressing     Problem: Safety - Adult  Goal: Free from fall injury  Outcome: Progressing

## 2025-05-26 NOTE — OP NOTE
[id number] [place] [address]                            OPERATIVE REPORT      PATIENT NAME: HERRERA JAIN               : 1975  MED REC NO: [id number]                         ROOM: 0347  ACCOUNT NO: [id number]                       ADMIT DATE: 2025  PROVIDER: Juan Miguel Méndez MD      DATE OF PROCEDURE:  2025    SURGEON:  Juan Miguel Méndez MD    PREOPERATIVE DIAGNOSIS:  Dog bite, right forearm.    POSTOPERATIVE DIAGNOSIS:  Dog bite, right forearm.    PROCEDURES:  Irrigation and excisional debridement of 10 x 8 cm wound extending down to muscle, right forearm.    ASSISTANTS:  Ranulfo Crystal and Monster Hennessy.    ANESTHESIA:  General.    ESTIMATED BLOOD LOSS:  Minimal.    SPECIMENS:  None.    INDICATIONS FOR SURGERY:  The patient is a 50-year-old left-hand dominant female, who was walking to the [employer] from her house when she was attacked by a dog.  She sustained a significant bite wound to the dorsum of the mid right forearm.  There is some soft tissue loss as this was not able to be approximated in the emergency room at all.  It was felt that debridement because of some devitalized skin and application of wound VAC dressing was the most appropriate care.  She was admitted and placed on IV antibiotics.  She is brought to the operating room today for that purpose.  The risks and benefits were explained prior to surgery with good understanding and is agreed to proceed.    DESCRIPTION OF PROCEDURE:  The patient was brought to the operating room and placed on the table in a supine position.  General anesthetic was administered per the Anesthesia Service.  A tourniquet was placed from the proximal right arm.  The dressing was removed.  The right upper extremity was prepped and draped out in a sterile fashion.    To begin the procedure, after standard time-out, the arm was exsanguinated with an Esmarch bandage and the

## 2025-05-26 NOTE — PROGRESS NOTES
Physical Therapy          Physical Therapy Cancel Note      DATE: 2025    NAME: Lisa Coppola  MRN: [id number]   : 1975      Patient not seen this date for Physical Therapy due to:    Off the floor for R forearm debridement. Will follow tomorrow.      Electronically signed by Dalton Forrest PT on 2025 at 11:37 AM

## 2025-05-27 LAB
ANION GAP SERPL CALCULATED.3IONS-SCNC: 11 MMOL/L (ref 9–16)
BASOPHILS # BLD: <0.03 K/UL (ref 0–0.2)
BASOPHILS NFR BLD: 0 % (ref 0–2)
BUN SERPL-MCNC: 10 MG/DL (ref 6–20)
CALCIUM SERPL-MCNC: 9.1 MG/DL (ref 8.6–10.4)
CHLORIDE SERPL-SCNC: 101 MMOL/L (ref 98–107)
CO2 SERPL-SCNC: 24 MMOL/L (ref 20–31)
CREAT SERPL-MCNC: 0.8 MG/DL (ref 0.6–0.9)
EOSINOPHIL # BLD: <0.03 K/UL (ref 0–0.44)
EOSINOPHILS RELATIVE PERCENT: 0 % (ref 1–4)
ERYTHROCYTE [DISTWIDTH] IN BLOOD BY AUTOMATED COUNT: 14.4 % (ref 11.8–14.4)
GFR, ESTIMATED: 89 ML/MIN/1.73M2
GLUCOSE BLD-MCNC: 100 MG/DL (ref 65–105)
GLUCOSE BLD-MCNC: 119 MG/DL (ref 65–105)
GLUCOSE BLD-MCNC: 143 MG/DL (ref 65–105)
GLUCOSE BLD-MCNC: 158 MG/DL (ref 65–105)
GLUCOSE SERPL-MCNC: 105 MG/DL (ref 74–99)
HCT VFR BLD AUTO: 39.2 % (ref 36.3–47.1)
HGB BLD-MCNC: 12.8 G/DL (ref 11.9–15.1)
IMM GRANULOCYTES # BLD AUTO: <0.03 K/UL (ref 0–0.3)
IMM GRANULOCYTES NFR BLD: 0 %
LYMPHOCYTES NFR BLD: 2.13 K/UL (ref 1.1–3.7)
LYMPHOCYTES RELATIVE PERCENT: 24 % (ref 24–43)
MAGNESIUM SERPL-MCNC: 2 MG/DL (ref 1.6–2.6)
MCH RBC QN AUTO: 31.7 PG (ref 25.2–33.5)
MCHC RBC AUTO-ENTMCNC: 32.7 G/DL (ref 28.4–34.8)
MCV RBC AUTO: 97 FL (ref 82.6–102.9)
MONOCYTES NFR BLD: 0.67 K/UL (ref 0.1–1.2)
MONOCYTES NFR BLD: 8 % (ref 3–12)
NEUTROPHILS NFR BLD: 68 % (ref 36–65)
NEUTS SEG NFR BLD: 5.91 K/UL (ref 1.5–8.1)
NRBC BLD-RTO: 0 PER 100 WBC
PHOSPHATE SERPL-MCNC: 3.6 MG/DL (ref 2.5–4.5)
PLATELET # BLD AUTO: 316 K/UL (ref 138–453)
PMV BLD AUTO: 8.9 FL (ref 8.1–13.5)
POTASSIUM SERPL-SCNC: 4.2 MMOL/L (ref 3.7–5.3)
RBC # BLD AUTO: 4.04 M/UL (ref 3.95–5.11)
SODIUM SERPL-SCNC: 136 MMOL/L (ref 136–145)
WBC OTHER # BLD: 8.7 K/UL (ref 3.5–11.3)

## 2025-05-27 PROCEDURE — 97162 PT EVAL MOD COMPLEX 30 MIN: CPT

## 2025-05-27 PROCEDURE — 2580000003 HC RX 258

## 2025-05-27 PROCEDURE — 6360000002 HC RX W HCPCS

## 2025-05-27 PROCEDURE — 97530 THERAPEUTIC ACTIVITIES: CPT

## 2025-05-27 PROCEDURE — 82947 ASSAY GLUCOSE BLOOD QUANT: CPT

## 2025-05-27 PROCEDURE — 6370000000 HC RX 637 (ALT 250 FOR IP)

## 2025-05-27 PROCEDURE — 83735 ASSAY OF MAGNESIUM: CPT

## 2025-05-27 PROCEDURE — 97166 OT EVAL MOD COMPLEX 45 MIN: CPT

## 2025-05-27 PROCEDURE — 36415 COLL VENOUS BLD VENIPUNCTURE: CPT

## 2025-05-27 PROCEDURE — 97165 OT EVAL LOW COMPLEX 30 MIN: CPT

## 2025-05-27 PROCEDURE — 97535 SELF CARE MNGMENT TRAINING: CPT

## 2025-05-27 PROCEDURE — 85025 COMPLETE CBC W/AUTO DIFF WBC: CPT

## 2025-05-27 PROCEDURE — 1200000000 HC SEMI PRIVATE

## 2025-05-27 PROCEDURE — 84100 ASSAY OF PHOSPHORUS: CPT

## 2025-05-27 PROCEDURE — 80048 BASIC METABOLIC PNL TOTAL CA: CPT

## 2025-05-27 PROCEDURE — 99231 SBSQ HOSP IP/OBS SF/LOW 25: CPT | Performed by: SURGERY

## 2025-05-27 PROCEDURE — 2500000003 HC RX 250 WO HCPCS

## 2025-05-27 RX ADMIN — METHOCARBAMOL 750 MG: 750 TABLET ORAL at 08:08

## 2025-05-27 RX ADMIN — ACETAMINOPHEN 1000 MG: 500 TABLET, FILM COATED ORAL at 23:09

## 2025-05-27 RX ADMIN — ENOXAPARIN SODIUM 40 MG: 100 INJECTION SUBCUTANEOUS at 20:22

## 2025-05-27 RX ADMIN — CLOBETASOL PROPIONATE: 0.5 OINTMENT TOPICAL at 09:01

## 2025-05-27 RX ADMIN — AMOXICILLIN AND CLAVULANATE POTASSIUM 1 TABLET: 875; 125 TABLET, FILM COATED ORAL at 20:41

## 2025-05-27 RX ADMIN — PHENOBARBITAL 97.2 MG: 32.4 TABLET ORAL at 08:06

## 2025-05-27 RX ADMIN — THERA TABS 1 TABLET: TAB at 08:08

## 2025-05-27 RX ADMIN — OXYCODONE 5 MG: 5 TABLET ORAL at 20:21

## 2025-05-27 RX ADMIN — METHOCARBAMOL 750 MG: 750 TABLET ORAL at 17:27

## 2025-05-27 RX ADMIN — ACETAMINOPHEN 1000 MG: 500 TABLET, FILM COATED ORAL at 13:14

## 2025-05-27 RX ADMIN — SENNOSIDES AND DOCUSATE SODIUM 1 TABLET: 50; 8.6 TABLET ORAL at 20:23

## 2025-05-27 RX ADMIN — Medication 100 MG: at 08:09

## 2025-05-27 RX ADMIN — GABAPENTIN 300 MG: 300 CAPSULE ORAL at 00:20

## 2025-05-27 RX ADMIN — CETIRIZINE HYDROCHLORIDE 10 MG: 10 TABLET, FILM COATED ORAL at 08:09

## 2025-05-27 RX ADMIN — CLOBETASOL PROPIONATE: 0.5 OINTMENT TOPICAL at 20:22

## 2025-05-27 RX ADMIN — PIPERACILLIN AND TAZOBACTAM 3375 MG: 3; .375 INJECTION, POWDER, LYOPHILIZED, FOR SOLUTION INTRAVENOUS at 12:06

## 2025-05-27 RX ADMIN — ACETAMINOPHEN 1000 MG: 500 TABLET, FILM COATED ORAL at 06:50

## 2025-05-27 RX ADMIN — PREDNISONE 30 MG: 20 TABLET ORAL at 08:06

## 2025-05-27 RX ADMIN — FOLIC ACID 1 MG: 1 TABLET ORAL at 08:07

## 2025-05-27 RX ADMIN — ENOXAPARIN SODIUM 40 MG: 100 INJECTION SUBCUTANEOUS at 08:09

## 2025-05-27 RX ADMIN — SODIUM CHLORIDE, PRESERVATIVE FREE 10 ML: 5 INJECTION INTRAVENOUS at 20:23

## 2025-05-27 RX ADMIN — PIPERACILLIN AND TAZOBACTAM 3375 MG: 3; .375 INJECTION, POWDER, LYOPHILIZED, FOR SOLUTION INTRAVENOUS at 04:07

## 2025-05-27 RX ADMIN — METHOCARBAMOL 750 MG: 750 TABLET ORAL at 13:14

## 2025-05-27 RX ADMIN — GABAPENTIN 300 MG: 300 CAPSULE ORAL at 08:08

## 2025-05-27 RX ADMIN — GABAPENTIN 300 MG: 300 CAPSULE ORAL at 15:05

## 2025-05-27 RX ADMIN — POLYETHYLENE GLYCOL 3350 17 G: 17 POWDER, FOR SOLUTION ORAL at 08:09

## 2025-05-27 RX ADMIN — LOSARTAN POTASSIUM 50 MG: 50 TABLET, FILM COATED ORAL at 08:07

## 2025-05-27 RX ADMIN — PHENOBARBITAL 97.2 MG: 32.4 TABLET ORAL at 20:22

## 2025-05-27 RX ADMIN — METHOCARBAMOL 750 MG: 750 TABLET ORAL at 20:23

## 2025-05-27 RX ADMIN — SENNOSIDES AND DOCUSATE SODIUM 1 TABLET: 50; 8.6 TABLET ORAL at 08:08

## 2025-05-27 RX ADMIN — GABAPENTIN 300 MG: 300 CAPSULE ORAL at 23:09

## 2025-05-27 RX ADMIN — PHENOBARBITAL 97.2 MG: 32.4 TABLET ORAL at 13:14

## 2025-05-27 ASSESSMENT — PAIN DESCRIPTION - LOCATION
LOCATION: ARM

## 2025-05-27 ASSESSMENT — PAIN SCALES - GENERAL
PAINLEVEL_OUTOF10: 5
PAINLEVEL_OUTOF10: 5
PAINLEVEL_OUTOF10: 7
PAINLEVEL_OUTOF10: 5
PAINLEVEL_OUTOF10: 7
PAINLEVEL_OUTOF10: 0

## 2025-05-27 ASSESSMENT — PAIN DESCRIPTION - DESCRIPTORS
DESCRIPTORS: ACHING
DESCRIPTORS: ACHING;DISCOMFORT
DESCRIPTORS: ACHING

## 2025-05-27 ASSESSMENT — PAIN DESCRIPTION - ORIENTATION
ORIENTATION: RIGHT
ORIENTATION: RIGHT

## 2025-05-27 NOTE — PLAN OF CARE
Problem: Pain  Goal: Verbalizes/displays adequate comfort level or baseline comfort level  5/27/2025 0624 by Danyel Curiel RN  Outcome: Progressing  5/26/2025 1809 by Rachael Cesar, RN  Outcome: Progressing     Problem: Safety - Adult  Goal: Free from fall injury  5/27/2025 0624 by Danyel Curiel, RN  Outcome: Progressing  5/26/2025 1809 by Rachael Cesar, RN  Outcome: Progressing     Problem: Discharge Planning  Goal: Discharge to home or other facility with appropriate resources  5/27/2025 0624 by Danyel Curiel, RN  Outcome: Progressing  5/26/2025 1809 by Rachael Cesar, RN  Outcome: Progressing

## 2025-05-27 NOTE — PROGRESS NOTES
PROGRESS NOTE          PATIENT NAME: Lisa D Call  MEDICAL RECORD NO. [id number]  DATE: 2025  PRIMARY CARE PHYSICIAN: Rosemarie Das MD    HD: # 2    ASSESSMENT    Patient Active Problem List   Diagnosis    Dog bite of face    Acute alcohol intoxication    Dog bite of finger    COPD exacerbation (HCC)    Obesity    Essential hypertension    Alcohol abuse    Smoker    Non-compliance    Cigarette nicotine dependence     Lactic acidosis    Bronchitis    Inspiratory stridor    Laryngeal polyp    Dog bite    Depressive disorder    Suicidal ideation    Dog bite of arm, right, initial encounter       MEDICAL DECISION MAKING AND PLAN    Right forearm soft tissue injury secondary to dog bite   S/p washout and wound vac with ortho  MMPT   Zosyn. Completed  On augmentin       SUBJECTIVE    Lisa D Call is is unchanged since yesterday. Reports 7/10 pain in injured arm. Denies any other pain.       OBJECTIVE  VITALS: Temp: Temp: 98.1 °F (36.7 °C)Temp  Av.2 °F (36.8 °C)  Min: 97.5 °F (36.4 °C)  Max: 98.5 °F (36.9 °C) BP Systolic (24hrs), Av , Min:129 , Max:158   Diastolic (24hrs), Av, Min:76, Max:106   Pulse Pulse  Av.1  Min: 56  Max: 83 Resp Resp  Av.6  Min: 14  Max: 18 Pulse ox SpO2  Av.8 %  Min: 90 %  Max: 100 %  GENERAL: alert, no distress  NEURO: no focal deficits  HEENT: BRENDA, EOM  : deferred  LUNGS: clear to ausculation, without wheezes, rales or rhonci  HEART: normal rate and regular rhythm  ABDOMEN: soft, non-tender, non-distended, bowel sounds present in all 4 quadrants, and no guarding or peritoneal signs present  EXTREMITY: no cyanosis, clubbing or edema, right arm in dressing and wound vac    I/O last 3 completed shifts:  In: 1936.3 [P.O.:450; I.V.:1321; IV Piggyback:165.3]  Out: 2925 [Urine:2725; Drains:200]    Drain/tube output:  In: 1436.4 [P.O.:450; I.V.:895.9]  Out: 2925 [Urine:2725; Drains:200]    LAB:  CBC:   Recent Labs     25  1923 25  0606

## 2025-05-27 NOTE — PROGRESS NOTES
Orthopedic Progress Note    Patient:  Lisa BRYANT Call  YOB: 1975     50 y.o. female    Subjective:  Patient seen and examined this morning. No complaints or concerns. No issues overnight per nursing. Pain is well controlled on current regimen. Denies fever, HA, CP, SOB, N/V, dysuria, new numbness/tingling.  200 cc output in wound VAC canister.  Wound VAC paperwork placed in patient's chart this a.m.  Vitals reviewed, afebrile    Objective:   Vitals:    05/27/25 0017   BP: 129/76   Pulse: 57   Resp: 18   Temp: 97.5 °F (36.4 °C)   SpO2: 96%     Gen: NAD, cooperative    Cardiovascular: Regular rate   Respiratory: No acute respiratory distress, breathing comfortably    Orthopedic Exam  RUE:    Wound VAC in place maintaining appropriate suction.  200 cc output of serosanguineous drainage.  Compartments soft compressible.  Able to flex and extend the elbow as well as the wrist.  Grossly neurovascularly intact to the right upper extremity without any focal deficit.  Extremities warm and well-perfused.    Recent Labs     05/26/25  0606   WBC 7.8   HGB 12.7   HCT 38.7         K 4.8   BUN 11   CREATININE 0.7   GLUCOSE 89      Meds:   Abx: Zosyn  DVT ppx: Lovenox  See rec for complete list    Impression 50 y.o. female being seen for:    - Dog bite, right forearm    DOS: 5/26/2025-irrigation and excisional debridement of 10 x 8 cm wound extending down to muscle, right forearm, placement of wound VAC.    Plan  - No further plans for orthopedic intervention at this time  - Zosyn q8h to end after 24 hours of coverage, last dose at 1200.  - Wound-Vac on RUE, 200cc output this AM. Please measure and record output every shift. Okay to reinforce/maintain by nursing if necessary. Please notify Ortho if saturated or malfunctioning.  - Ace overlying [place] on RUE. Okay to reinforce/maintain by nursing if necessary. Please notify Ortho if saturated.  - WBAT  to the RUE.  - Pain control and medical

## 2025-05-27 NOTE — PROGRESS NOTES
Occupational Therapy  Occupational Therapy Initial Evaluation  Facility/Department: [address] MED SURG   Patient Name: Lisa Coppola        MRN: [id number]    : 1975    Date of Service: 2025    Chief Complaint   Patient presents with    Animal Bite     Past Medical History:  has a past medical history of Asthma, Carpal tunnel syndrome, Chronic kidney disease, COPD (chronic obstructive pulmonary disease) (HCC), Hypertension, and Liver disease.  Past Surgical History:  has a past surgical history that includes pr excision bone mandible (N/A, 2017); laryngoscopy (2019); laryngoscopy (Bilateral, 2019); Forearm surgery (Right, 2025); and Arm Surgery (Right, 2025).    Discharge Recommendations  Discharge Recommendations: Patient would benefit from continued therapy after discharge    Assessment  Performance deficits / Impairments: Decreased functional mobility ;Decreased ADL status;Decreased safe awareness;Decreased endurance;Decreased balance  Assessment: Pt seen for OT eval this date following admission for dog bite on R forearm. Pt presents with above noted deficits affecting engagement/performance in ADLs. Pt completed bed mobility and transfers indepedently however pt required CGA during functional mobility due to 2x bouts of unsteadiness however without true LOB. Pt demonstrates decreased safety awareness and was intermittently impulsive. Pt would benefit from continued OT while hospitalized to increase independence and safety with ADL completion. Pt would benefit from assistance PRN and further OT following discharge to previous living environment.  Prognosis: Good  Decision Making: Medium Complexity  REQUIRES OT FOLLOW-UP: Yes  Activity Tolerance  Activity Tolerance: Patient Tolerated treatment well  Safety Devices  Type of Devices: Gait belt;Bed alarm in place;Left in bed  Restraints  Restraints Initially in Place: No    AM-PAC  AM-PAC Daily Activity - Inpatient   How much

## 2025-05-27 NOTE — PLAN OF CARE
Problem: Pain  Goal: Verbalizes/displays adequate comfort level or baseline comfort level  5/27/2025 1825 by Cleve Jung RN  Outcome: Progressing  5/27/2025 0624 by Danyel Curiel RN  Outcome: Progressing     Problem: Safety - Adult  Goal: Free from fall injury  5/27/2025 1825 by Cleve Jung RN  Outcome: Progressing  5/27/2025 0624 by Danyel Curiel RN  Outcome: Progressing     Problem: Discharge Planning  Goal: Discharge to home or other facility with appropriate resources  5/27/2025 1825 by Cleve Jung RN  Outcome: Progressing  5/27/2025 0624 by Daynel Curiel RN  Outcome: Progressing

## 2025-05-27 NOTE — PROGRESS NOTES
[facility] Inpatient Brief Diagnostic Assessment Note  COURTNEY ZAVALABradenS   5/27/2025    Lisa D Call  1975  [id number]      Time Spent with Patient: 15 minutes or less (Brief Diagnostic Assessment) 97655    Pt was provided informed consent for the [facility]. Discussed with patient model of service to include the limits of confidentiality (i.e. abuse reporting, suicide intervention, etc.) and short-term intervention focused approach.  Pt indicated understanding.    [facility] Inpatient or Virtual Question: This was not a virtual session    Is consult a Victim of Crime?: No    Presenting Patient Report:    Patient presents as a 50 y.o. female subsequent to hospital admission following Dog Bite resulting in injury.     Patient denies any current or previous symptoms of depression or anxiety and declines Mental Health interventions at this time.  Patient reports she has been bitten before and this is no big thing    Patient was able to complete the following screens: ITSS    MSE:     Appearance:   Bandages, Hospital Gown, Well Groomed, Good Hygiene, and Good Eye Contact  Speech:    spontaneous, normal rate, normal volume, and well-articulated  Affect Observed:   Appropriate to Context  Thought Content:    intact  Thought Process:    linear, goal directed, and coherent  Associations:    logical connections  Insight:    Fair  Judgment:    Intact  Orientation:    oriented to person, place, time, and general circumstances    Patient reports and/or exhibits the following symptoms:    Mood: Appropriate to Context    Cognitive symptoms: Appropriate to Context    Behaviors: Appropriate to Context    Somatic: None Reported      Assessment:  Patient denies any previous or current symptoms for depression or anxiety.  Patient scored low on Injured Trauma Survivor Screen (ITSS).  Patient does not meet criteria for new depression or anxiety diagnosis at this time.     Screening Scale Results (If

## 2025-05-28 VITALS
DIASTOLIC BLOOD PRESSURE: 94 MMHG | SYSTOLIC BLOOD PRESSURE: 139 MMHG | HEART RATE: 49 BPM | RESPIRATION RATE: 16 BRPM | OXYGEN SATURATION: 98 % | BODY MASS INDEX: 40.47 KG/M2 | TEMPERATURE: 97.9 F | WEIGHT: 206.13 LBS | HEIGHT: 60 IN

## 2025-05-28 LAB
ANION GAP SERPL CALCULATED.3IONS-SCNC: 9 MMOL/L (ref 9–16)
BASOPHILS # BLD: 0.03 K/UL (ref 0–0.2)
BASOPHILS NFR BLD: 0 % (ref 0–2)
BUN SERPL-MCNC: 14 MG/DL (ref 6–20)
CALCIUM SERPL-MCNC: 9 MG/DL (ref 8.6–10.4)
CHLORIDE SERPL-SCNC: 103 MMOL/L (ref 98–107)
CO2 SERPL-SCNC: 26 MMOL/L (ref 20–31)
CREAT SERPL-MCNC: 0.9 MG/DL (ref 0.6–0.9)
EOSINOPHIL # BLD: 0.06 K/UL (ref 0–0.44)
EOSINOPHILS RELATIVE PERCENT: 1 % (ref 1–4)
ERYTHROCYTE [DISTWIDTH] IN BLOOD BY AUTOMATED COUNT: 14.6 % (ref 11.8–14.4)
GFR, ESTIMATED: 77 ML/MIN/1.73M2
GLUCOSE BLD-MCNC: 114 MG/DL (ref 65–105)
GLUCOSE BLD-MCNC: 85 MG/DL (ref 65–105)
GLUCOSE SERPL-MCNC: 86 MG/DL (ref 74–99)
HCT VFR BLD AUTO: 37.3 % (ref 36.3–47.1)
HGB BLD-MCNC: 12.2 G/DL (ref 11.9–15.1)
IMM GRANULOCYTES # BLD AUTO: 0.03 K/UL (ref 0–0.3)
IMM GRANULOCYTES NFR BLD: 0 %
LYMPHOCYTES NFR BLD: 3.4 K/UL (ref 1.1–3.7)
LYMPHOCYTES RELATIVE PERCENT: 42 % (ref 24–43)
MAGNESIUM SERPL-MCNC: 2.1 MG/DL (ref 1.6–2.6)
MCH RBC QN AUTO: 31.6 PG (ref 25.2–33.5)
MCHC RBC AUTO-ENTMCNC: 32.7 G/DL (ref 28.4–34.8)
MCV RBC AUTO: 96.6 FL (ref 82.6–102.9)
MONOCYTES NFR BLD: 0.49 K/UL (ref 0.1–1.2)
MONOCYTES NFR BLD: 6 % (ref 3–12)
NEUTROPHILS NFR BLD: 51 % (ref 36–65)
NEUTS SEG NFR BLD: 4.04 K/UL (ref 1.5–8.1)
NRBC BLD-RTO: 0 PER 100 WBC
PHOSPHATE SERPL-MCNC: 3.8 MG/DL (ref 2.5–4.5)
PLATELET # BLD AUTO: 300 K/UL (ref 138–453)
PMV BLD AUTO: 8.7 FL (ref 8.1–13.5)
POTASSIUM SERPL-SCNC: 4.6 MMOL/L (ref 3.7–5.3)
RBC # BLD AUTO: 3.86 M/UL (ref 3.95–5.11)
RBC # BLD: ABNORMAL 10*6/UL
SODIUM SERPL-SCNC: 138 MMOL/L (ref 136–145)
WBC OTHER # BLD: 8.1 K/UL (ref 3.5–11.3)

## 2025-05-28 PROCEDURE — 99238 HOSP IP/OBS DSCHRG MGMT 30/<: CPT | Performed by: NURSE PRACTITIONER

## 2025-05-28 PROCEDURE — 83735 ASSAY OF MAGNESIUM: CPT

## 2025-05-28 PROCEDURE — 80048 BASIC METABOLIC PNL TOTAL CA: CPT

## 2025-05-28 PROCEDURE — 6360000002 HC RX W HCPCS

## 2025-05-28 PROCEDURE — 82947 ASSAY GLUCOSE BLOOD QUANT: CPT

## 2025-05-28 PROCEDURE — 36415 COLL VENOUS BLD VENIPUNCTURE: CPT

## 2025-05-28 PROCEDURE — 6370000000 HC RX 637 (ALT 250 FOR IP)

## 2025-05-28 PROCEDURE — 84100 ASSAY OF PHOSPHORUS: CPT

## 2025-05-28 PROCEDURE — 2500000003 HC RX 250 WO HCPCS

## 2025-05-28 PROCEDURE — 85025 COMPLETE CBC W/AUTO DIFF WBC: CPT

## 2025-05-28 RX ORDER — METHOCARBAMOL 750 MG/1
750 TABLET, FILM COATED ORAL 4 TIMES DAILY
Qty: 20 TABLET | Refills: 0 | Status: SHIPPED | OUTPATIENT
Start: 2025-05-28 | End: 2025-06-02

## 2025-05-28 RX ORDER — OXYCODONE HYDROCHLORIDE 5 MG/1
5 TABLET ORAL EVERY 8 HOURS PRN
Qty: 9 TABLET | Refills: 0 | Status: SHIPPED | OUTPATIENT
Start: 2025-05-28 | End: 2025-05-31

## 2025-05-28 RX ADMIN — POLYETHYLENE GLYCOL 3350 17 G: 17 POWDER, FOR SOLUTION ORAL at 09:56

## 2025-05-28 RX ADMIN — FOLIC ACID 1 MG: 1 TABLET ORAL at 09:57

## 2025-05-28 RX ADMIN — CLOBETASOL PROPIONATE: 0.5 OINTMENT TOPICAL at 09:55

## 2025-05-28 RX ADMIN — ENOXAPARIN SODIUM 40 MG: 100 INJECTION SUBCUTANEOUS at 09:56

## 2025-05-28 RX ADMIN — METHOCARBAMOL 750 MG: 750 TABLET ORAL at 14:37

## 2025-05-28 RX ADMIN — ACETAMINOPHEN 1000 MG: 500 TABLET, FILM COATED ORAL at 06:04

## 2025-05-28 RX ADMIN — THERA TABS 1 TABLET: TAB at 09:57

## 2025-05-28 RX ADMIN — SODIUM CHLORIDE, PRESERVATIVE FREE 10 ML: 5 INJECTION INTRAVENOUS at 09:55

## 2025-05-28 RX ADMIN — CETIRIZINE HYDROCHLORIDE 10 MG: 10 TABLET, FILM COATED ORAL at 09:57

## 2025-05-28 RX ADMIN — AMOXICILLIN AND CLAVULANATE POTASSIUM 1 TABLET: 875; 125 TABLET, FILM COATED ORAL at 09:57

## 2025-05-28 RX ADMIN — ACETAMINOPHEN 1000 MG: 500 TABLET, FILM COATED ORAL at 14:37

## 2025-05-28 RX ADMIN — PREDNISONE 30 MG: 20 TABLET ORAL at 09:56

## 2025-05-28 RX ADMIN — Medication 100 MG: at 09:57

## 2025-05-28 RX ADMIN — GABAPENTIN 300 MG: 300 CAPSULE ORAL at 09:58

## 2025-05-28 RX ADMIN — OXYCODONE 5 MG: 5 TABLET ORAL at 06:04

## 2025-05-28 RX ADMIN — METHOCARBAMOL 750 MG: 750 TABLET ORAL at 09:56

## 2025-05-28 RX ADMIN — GABAPENTIN 300 MG: 300 CAPSULE ORAL at 14:37

## 2025-05-28 RX ADMIN — SENNOSIDES AND DOCUSATE SODIUM 1 TABLET: 50; 8.6 TABLET ORAL at 09:57

## 2025-05-28 RX ADMIN — PHENOBARBITAL 64.8 MG: 64.8 TABLET ORAL at 09:59

## 2025-05-28 RX ADMIN — LOSARTAN POTASSIUM 50 MG: 50 TABLET, FILM COATED ORAL at 09:57

## 2025-05-28 RX ADMIN — PHENOBARBITAL 64.8 MG: 64.8 TABLET ORAL at 14:37

## 2025-05-28 ASSESSMENT — PAIN DESCRIPTION - LOCATION
LOCATION: ARM

## 2025-05-28 ASSESSMENT — PAIN DESCRIPTION - DESCRIPTORS
DESCRIPTORS: ACHING
DESCRIPTORS: ACHING
DESCRIPTORS: THROBBING;ITCHING

## 2025-05-28 ASSESSMENT — PAIN SCALES - GENERAL
PAINLEVEL_OUTOF10: 9
PAINLEVEL_OUTOF10: 5
PAINLEVEL_OUTOF10: 7
PAINLEVEL_OUTOF10: 5

## 2025-05-28 ASSESSMENT — PAIN DESCRIPTION - ORIENTATION
ORIENTATION: RIGHT
ORIENTATION: RIGHT

## 2025-05-28 NOTE — PROGRESS NOTES
Discharge instructions reviewed. Answered all questions. IV removed. Pt discharged from unit via wheelchair with all belongings.

## 2025-05-28 NOTE — PROGRESS NOTES
Orthopedic Progress Note    Patient:  Lisa BRYANT Call  YOB: 1975     50 y.o. female    Subjective:  Patient seen and examined this morning. No complaints or concerns. No issues overnight per nursing. Pain is well controlled on current regimen. Denies fever, HA, CP, SOB, N/V, dysuria, new numbness/tingling. No BM, but flatus +.  There is 200 cc output in the wound VAC canister, which is comparable to prior examination as documented in the notes.  Patient does complain this morning of increased irritation to the surgical wound.    Vitals reviewed, afebrile    Objective:   Vitals:    05/27/25 2051   BP:    Pulse:    Resp: 16   Temp:    SpO2:      Gen: NAD, cooperative    Cardiovascular: Regular rate on monitor  Respiratory: No acute respiratory distress, breathing comfortably    Orthopedic Exam  Wound VAC in place to the right forearm maintaining appropriate suction with 200 cc output of serosanguineous drainage in the canister.  Compartments of the right upper extremity are soft and easily compressible.  And/PIN/radial/ulnar/median gross motor intact.  Superficial radial/radial/median/ulnar sensation to light touch intact.  Extremities warm and well-perfused.    Recent Labs     05/27/25  0631   WBC 8.7   HGB 12.8   HCT 39.2         K 4.2   BUN 10   CREATININE 0.8   GLUCOSE 105*      Meds:   Abx: Augmentin  DVT ppx: Lovenox 40 mg twice daily  See rec for complete list    Impression 50 y.o. female who is being seen for:    - Dog bite, right forearm     DOS: 5/26/2025 with Dr. Méndez  Irrigation and excisional debridement of 10 x 8 cm wound extending down to muscle, right forearm, placement of wound VAC.     Plan  - No further plans for orthopedic intervention at this time  - ABX: Augmentin  - Wound-Vac on RUE, no new output recorded over the last 24 hours. please measure and record output every shift. Okay to reinforce/maintain by nursing if necessary. Please notify Ortho if saturated

## 2025-05-28 NOTE — PROGRESS NOTES
PROGRESS NOTE          PATIENT NAME: Lisa D Call  MEDICAL RECORD NO. [id number]  DATE: 2025  PRIMARY CARE PHYSICIAN: Rosemarie Das MD    HD: # 3    ASSESSMENT    Patient Active Problem List   Diagnosis    Dog bite of face    Acute alcohol intoxication    Dog bite of finger    COPD exacerbation (HCC)    Obesity    Essential hypertension    Alcohol abuse    Smoker    Non-compliance    Cigarette nicotine dependence     Lactic acidosis    Bronchitis    Inspiratory stridor    Laryngeal polyp    Dog bite    Depressive disorder    Suicidal ideation    Dog bite of arm, right, initial encounter       MEDICAL DECISION MAKING AND PLAN    Right forearm soft tissue injury secondary to dog bite   S/p washout and wound vac with ortho  MMPT   Zosyn. Completed  On augmentin       SUBJECTIVE    Lisa D Call is is unchanged since yesterday. . Denies any complaints or symptoms. Pain is well controlled.      OBJECTIVE  VITALS: Temp: Temp: 97.5 °F (36.4 °C)Temp  Av.8 °F (36.6 °C)  Min: 97.5 °F (36.4 °C)  Max: 98.1 °F (36.7 °C) BP Systolic (24hrs), Av , Min:126 , Max:142   Diastolic (24hrs), Av, Min:64, Max:91   Pulse Pulse  Av.5  Min: 56  Max: 63 Resp Resp  Av.3  Min: 16  Max: 17 Pulse ox SpO2  Av %  Min: 99 %  Max: 99 %  GENERAL: alert, no distress  NEURO: no focal deficits  HEENT: BRENDA, EOM  : deferred  LUNGS: clear to ausculation, without wheezes, rales or rhonci  HEART: normal rate and regular rhythm  ABDOMEN: soft, non-tender, non-distended, bowel sounds present in all 4 quadrants, and no guarding or peritoneal signs present  EXTREMITY: no cyanosis, clubbing or edema, right arm in dressing and wound vac    I/O last 3 completed shifts:  In: 1444.8 [P.O.:950; I.V.:345.9; IV Piggyback:148.9]  Out: 3550 [Urine:3450; Drains:100]    Drain/tube output:  In: 558.4 [P.O.:500]  Out: 1900 [Urine:1900]    LAB:  CBC:   Recent Labs     25  1923 25  0606 25  0631   WBC 11.4* 7.8

## 2025-05-28 NOTE — PROGRESS NOTES
CLINICAL PHARMACY NOTE: MEDS TO BEDS    Total # of Prescriptions Filled: 3   The following medications were delivered to the patient:  Oxycodone 5 mg  Augmentin 875-125 mg  Methocarbamol 750 mg    Additional Documentation: dropped off to patient in room 347 on 5/28/25 at 11:30 am by sami. Copay was 27.96 and paid by [employer]

## 2025-05-28 NOTE — CARE COORDINATION
Case Management   Daily Progress Note       Patient Name: Lisa BRYANT Call                   YOB: 1975  Diagnosis: Dog bite of arm, right, initial encounter [S41.151A, W54.0XXA]  Dog bite of right upper extremity, initial encounter [S41.151A, W54.0XXA]                         days  Length of Stay: 2  days    Anticipated Discharge Date: Two or more days before discharge    Readmission Risk (Low < 19, Mod (19-27), High > 27): Readmission Risk Score: 8.4        Current Transitional Plan    [] Home Independently    [] Home with HC    [x] Skilled Nursing Facility    [] Acute Rehabilitation    [] Long Term Acute Care (LTAC)    [] Other:     Plan for the Stay (Medical Management) :  Pt will need wound vac, lives w stepfather who is disabled and unable to care for pt. Pt does not have insurance but did sign up for FEDE in 4/2/25.         Workflow Continuation (Additional Notes) :  Spoke w Amy PAREKH in trauma who contacted Tiana in [place] who states no FEDE pending for pt - she will meet w pt to initiate FEDE. With no insurance pt can not get home care or SNF.   Amy will check w ortho to see if Wet to dry a possibility .       ROSALIE PAGE  May 27, 2025        
Case Management   Daily Progress Note       Patient Name: Lisa BRYANT Call                   YOB: 1975  Diagnosis: Dog bite of arm, right, initial encounter [S41.151A, W54.0XXA]  Dog bite of right upper extremity, initial encounter [S41.151A, W54.0XXA]                       GMLOS: 4.2 days  Length of Stay: 3  days    Anticipated Discharge Date: Ready for discharge    Readmission Risk (Low < 19, Mod (19-27), High > 27): Readmission Risk Score: 8.4        Current Transitional Plan    [x] Home Independently    [] Home with HC    [] Skilled Nursing Facility    [] Acute Rehabilitation    [] Long Term Acute Care (LTAC)    [] Other:     Plan for the Stay (Medical Management) :          Workflow Continuation (Additional Notes) : Phone call from April in [facility] pharmacy asking for med voucher.    1050 Med voucher faxed to pharmacy.    1513 Booked [employer] cab to facilitate discharge due to patient having no available family or friends to provide transport home today.    Confirmation #:[id number]  Customer:Anat Gonzalez  Phone Number:[phone]  Voucher Number:[id number]  Trip Date/Time:5/28/2025 4:15:00 PM  Pickup Address:[address] [place] [address], [facility]  Drop Off Address:[address] [place] [address]        Melody Bolden RN  May 28, 2025        
Discharge Report    [facility]  Clinical Case Management Department  Written by: Melody Bolden RN    Patient Name: Lisasa ANNETTE Coppola  Attending Provider: Da Luke DO  Admit Date: 2025  6:59 PM  MRN: [id number]  Account: [id number]                     : 1975  Discharge Date: 25      Disposition: home    Melody Bolden RN    
SBIRT completed with pt. Pt admitted after being bit by a stray dog.   Pt reports she drinks alcohol 1-2x week, 2-3 16oz beers. She admits to marijuana use \"every once in awhile\". She denies all other drug use. Pt reports she has been concerned about her drinking. Pt stated she had been thru tx many years ago at [facility]. Stated she has attended AA in the past and didn't like them but plans to attend again with her son to support him. She was receptive to receiving tx resources, which were provided. Pt concerned she has no insurance - informed her that [facility] has been contacted to assist with this. Pt denies any recent feelings of depression/SI.                    Alcohol Screening and Brief Intervention        No results for input(s): \"ALC\" in the last 72 hours.    Alcohol Pre-screening     (WOMEN ONLY) How many times in the past year have you had 4 or more drinks in a day?: 1 or more  TOTAL SCORE:: 7    Drug Pre-Screening        Drug Screening DAST  TOTAL SCORE:: 1    Mood Pre-Screening (PHQ-2)  During the past 2 weeks, have you been bothered by, feeling down, depressed or hopeless?  No        I have interviewed Lisa BRYANT Anat, [id number] regarding  Her alcohol consumption/drug use and risk for excessive use. Screenings were positive.  Patient  Declined intervention at this time but was agreeable to tx resources, which were provided.   Deferred []    Completed on: 5/27/2025   COURTNEY KINCAID  
Trauma Coordinator Rounding Note  Daily check in:  I met with Lisa BRYANT Call  at bedside to review their plan of care. She was awake and in bed. I allowed opportunity for Lisa D Call to ask questions regarding their injuries, expected length of stay and disposition plan. Ortho resident to bedside to explain wound vac. Pt was given extra canisters to take home. Discussed medications with patient at that case management was working on getting voucher for antibiotic. Pain mediation total obtained form outpatient pharmacy and shared with pt. Pt states a friend can help her cover the remaining copay.  All questions answered to verbal satisfaction.   [x] Wounds  [x] PT/OT/speech  [x] Incentive spirometer  [x] Diet  [x] Activity  [] Paperwork (NA)  [x] Pain Management Check-in  Bedside Nurse:  I spoke with the patient's assigned nurse to review the plan of care for today and provide an opportunity to ask questions or relay any concerns to the Trauma service.  No needs currently.   :  I provided a clinical update to the unit  and confirmed the disposition plan. Current barriers to discharge include:  medications to take home, wound vac education. Can be discharged home today.   Electronically signed by Melissa Haney RN on 5/28/2025 at 11:52 AM   
Trauma Coordinator Rounding Note  Daily check in:  I met with Lisa D Call  at bedside to review their plan of care. She was awake in bed eating her lunch. I allowed opportunity for Lisa D Call to ask questions regarding their injuries, expected length of stay and disposition plan. Lisa reports that she started seeing a new Dr at [facility]. They appointed her with and advocate. Pt does not have insurance. Her advocated Sophia ([phone]) helped her fill out the medicaid forms on April 3rd and they were faxed at that time. This was confirmed by me after placing a phone call to [facility]. I spoke with Tiana with [employer], she reports there is no pending medicaid showing in the system. Helene states she will come up and see the patient. All questions answered to verbal satisfaction.   [x]Wounds / Injuries  [x]PT/OT/speech  []Incentive spirometer  [x]Diet  [x]Activity  []Preferred pharmacy  [x] PCP Confirmed  [] Medical Insurance Confirmed (No medical insurance)   [] Work-related Injury (NA)  [x] Pain Management Education    DC Expectation:  Patient expects to be discharged to  unknown at this time due to medical needs for  wound.   Bedside Nurse:  I spoke with the patient's assigned nurse to review the plan of care for today and provide an opportunity to ask questions or relay any concerns to the Trauma service. No needs currently.    Discharge Info:  I confirmed follow up information was placed in the discharge instructions for  unknown at this time .    :  I provided a clinical update to the unit  and confirmed the disposition plan. Current barriers to discharge include:  no insurance with wound vac needs.     Electronically signed by Melissa Haney RN on 5/27/2025 at 12:09 PM   
RETURNING to current housing: none  Potential Assistance needed at discharge: (P) N/A            Potential DME:    Patient expects to discharge to: (P) House  Plan for transportation at discharge: (P) Cab    Financial    Payor: /     Does insurance require precert for SNF: Yes    Potential assistance Purchasing Medications: (P) Yes  Meds-to-Beds request:        [facility] [id number] - [place], [place] - [address] - P [phone] - F [phone]  [address]  [place] [address]  Phone: [phone] Fax: [phone]      Notes:    Factors facilitating achievement of predicted outcomes: Family support, Friend support, Motivated, Cooperative, and Pleasant    Barriers to discharge: Medical complications    Additional Case Management Notes: from home with family assist, plan is to return will need med cab,     Case Management Services Information Letter Provided [x]      The Plan for Transition of Care is related to the following treatment goals of Dog bite of arm, right, initial encounter [S41.151A, W54.0XXA]  Dog bite of right upper extremity, initial encounter [S41.151A, W54.0XXA]    IF APPLICABLE: The Patient and/or patient representative Lisa and her family were provided with a choice of provider and agrees with the discharge plan. Freedom of choice list with basic dialogue that supports the patient's individualized plan of care/goals and shares the quality data associated with the providers was provided to:     Patient Representative Name:       The Patient and/or Patient Representative Agree with the Discharge Plan?      Milvia Meraz RN  Case Management Department  Ph: [phone] Fax: [phone]

## 2025-05-28 NOTE — PLAN OF CARE
Problem: Pain  Goal: Verbalizes/displays adequate comfort level or baseline comfort level  5/28/2025 0312 by Pranay Bennett RN  Outcome: Progressing  5/27/2025 1825 by Cleve Jung RN  Outcome: Progressing     Problem: Safety - Adult  Goal: Free from fall injury  5/28/2025 0312 by Pranay Bennett RN  Outcome: Progressing  5/27/2025 1825 by Cleve Jung RN  Outcome: Progressing     Problem: Discharge Planning  Goal: Discharge to home or other facility with appropriate resources  5/28/2025 0312 by Pranay Bennett RN  Outcome: Progressing  5/27/2025 1825 by Cleve Jung RN  Outcome: Progressing

## 2025-05-29 NOTE — DISCHARGE SUMMARY
as: Protopic  Apply to rash twice daily, as needed (Safe for use on face).            STOP taking these medications      doxycycline hyclate 100 MG capsule  Commonly known as: VIBRAMYCIN     ibuprofen 800 MG tablet  Commonly known as: IBU               Where to Get Your Medications        These medications were sent to [facility] [address] - P [phone] - F [phone]  [facility] [address]      Phone: [phone]   amoxicillin-clavulanate 875-125 MG per tablet  methocarbamol 750 MG tablet  oxyCODONE 5 MG immediate release tablet       Diet: No diet orders on file diet as tolerated  Activity: As instructed WEIGHT BEARING STATUS: Weight bearing as tolerated  Wound Care: Daily and as needed.    DISPOSITION: Home    Follow-up:  Rosemarie Das MD  [address] [place], [place] [id number]  [phone]    Follow up  call for follow up after hospital discharge    JuanM iguel Méndez MD  [address] [id number] [phone]    Follow up  call for follow up in 7-10 days        SIGNED:  MICHELLE Warner CNP   5/29/2025, 3:09 PM  Time Spent for discharge: 25 minutes

## 2025-06-05 ENCOUNTER — APPOINTMENT (OUTPATIENT)
Dept: GENERAL RADIOLOGY | Age: 50
DRG: 383 | End: 2025-06-05
Payer: MEDICAID

## 2025-06-05 ENCOUNTER — HOSPITAL ENCOUNTER (INPATIENT)
Age: 50
LOS: 1 days | Discharge: HOME | DRG: 383 | End: 2025-06-06
Attending: EMERGENCY MEDICINE | Admitting: INTERNAL MEDICINE
Payer: MEDICAID

## 2025-06-05 DIAGNOSIS — T14.8XXA WOUND INFECTION: Primary | ICD-10-CM

## 2025-06-05 DIAGNOSIS — L08.9 WOUND INFECTION: Primary | ICD-10-CM

## 2025-06-05 LAB
ALBUMIN SERPL-MCNC: 3.9 G/DL (ref 3.5–5.2)
ALBUMIN/GLOB SERPL: 1.3 {RATIO} (ref 1–2.5)
ALP SERPL-CCNC: 75 U/L (ref 35–104)
ALT SERPL-CCNC: 19 U/L (ref 10–35)
AMPHET UR QL SCN: NEGATIVE
ANION GAP SERPL CALCULATED.3IONS-SCNC: 11 MMOL/L (ref 9–16)
AST SERPL-CCNC: 17 U/L (ref 10–35)
B PARAP IS1001 DNA NPH QL NAA+NON-PROBE: NOT DETECTED
B PERT DNA SPEC QL NAA+PROBE: NOT DETECTED
BACTERIA URNS QL MICRO: ABNORMAL
BARBITURATES UR QL SCN: POSITIVE
BASOPHILS # BLD: 0.04 K/UL (ref 0–0.2)
BASOPHILS NFR BLD: 0 % (ref 0–2)
BENZODIAZ UR QL: NEGATIVE
BILIRUB DIRECT SERPL-MCNC: 0.1 MG/DL (ref 0–0.2)
BILIRUB INDIRECT SERPL-MCNC: 0.1 MG/DL (ref 0–1)
BILIRUB SERPL-MCNC: 0.2 MG/DL (ref 0–1.2)
BILIRUB UR QL STRIP: NEGATIVE
BNP SERPL-MCNC: 95 PG/ML (ref 0–125)
BUN SERPL-MCNC: 11 MG/DL (ref 6–20)
C PNEUM DNA NPH QL NAA+NON-PROBE: NOT DETECTED
CALCIUM SERPL-MCNC: 9.3 MG/DL (ref 8.6–10.4)
CANNABINOIDS UR QL SCN: POSITIVE
CASTS #/AREA URNS LPF: ABNORMAL /LPF (ref 0–8)
CHLORIDE SERPL-SCNC: 104 MMOL/L (ref 98–107)
CLARITY UR: CLEAR
CO2 SERPL-SCNC: 25 MMOL/L (ref 20–31)
COCAINE UR QL SCN: NEGATIVE
COLOR UR: YELLOW
CREAT SERPL-MCNC: 0.8 MG/DL (ref 0.6–0.9)
CRP SERPL HS-MCNC: 4.1 MG/L (ref 0–5)
EOSINOPHIL # BLD: 0.18 K/UL (ref 0–0.44)
EOSINOPHILS RELATIVE PERCENT: 2 % (ref 1–4)
EPI CELLS #/AREA URNS HPF: ABNORMAL /HPF (ref 0–5)
ERYTHROCYTE [DISTWIDTH] IN BLOOD BY AUTOMATED COUNT: 14.4 % (ref 11.8–14.4)
ERYTHROCYTE [SEDIMENTATION RATE] IN BLOOD BY PHOTOMETRIC METHOD: 34 MM/HR (ref 0–30)
EST. AVERAGE GLUCOSE BLD GHB EST-MCNC: 114 MG/DL
FENTANYL UR QL: NEGATIVE
FLUAV RNA NPH QL NAA+NON-PROBE: NOT DETECTED
FLUBV RNA NPH QL NAA+NON-PROBE: NOT DETECTED
GFR, ESTIMATED: 89 ML/MIN/1.73M2
GLOBULIN SER CALC-MCNC: 3 G/DL
GLUCOSE SERPL-MCNC: 106 MG/DL (ref 74–99)
GLUCOSE UR STRIP-MCNC: NEGATIVE MG/DL
HADV DNA NPH QL NAA+NON-PROBE: NOT DETECTED
HBA1C MFR BLD: 5.6 % (ref 4–6)
HCOV 229E RNA NPH QL NAA+NON-PROBE: NOT DETECTED
HCOV HKU1 RNA NPH QL NAA+NON-PROBE: NOT DETECTED
HCOV NL63 RNA NPH QL NAA+NON-PROBE: NOT DETECTED
HCOV OC43 RNA NPH QL NAA+NON-PROBE: NOT DETECTED
HCT VFR BLD AUTO: 39.7 % (ref 36.3–47.1)
HGB BLD-MCNC: 13 G/DL (ref 11.9–15.1)
HGB UR QL STRIP.AUTO: NEGATIVE
HMPV RNA NPH QL NAA+NON-PROBE: NOT DETECTED
HPIV1 RNA NPH QL NAA+NON-PROBE: NOT DETECTED
HPIV2 RNA NPH QL NAA+NON-PROBE: NOT DETECTED
HPIV3 RNA NPH QL NAA+NON-PROBE: NOT DETECTED
HPIV4 RNA NPH QL NAA+NON-PROBE: NOT DETECTED
IMM GRANULOCYTES # BLD AUTO: 0.04 K/UL (ref 0–0.3)
IMM GRANULOCYTES NFR BLD: 0 %
KETONES UR STRIP-MCNC: ABNORMAL MG/DL
LACTIC ACID, WHOLE BLOOD: 1.3 MMOL/L (ref 0.7–2.1)
LEUKOCYTE ESTERASE UR QL STRIP: NEGATIVE
LYMPHOCYTES NFR BLD: 2.87 K/UL (ref 1.1–3.7)
LYMPHOCYTES RELATIVE PERCENT: 32 % (ref 24–43)
M PNEUMO DNA NPH QL NAA+NON-PROBE: NOT DETECTED
MCH RBC QN AUTO: 31.5 PG (ref 25.2–33.5)
MCHC RBC AUTO-ENTMCNC: 32.7 G/DL (ref 28.4–34.8)
MCV RBC AUTO: 96.1 FL (ref 82.6–102.9)
METHADONE UR QL: NEGATIVE
MONOCYTES NFR BLD: 0.62 K/UL (ref 0.1–1.2)
MONOCYTES NFR BLD: 7 % (ref 3–12)
NEUTROPHILS NFR BLD: 59 % (ref 36–65)
NEUTS SEG NFR BLD: 5.32 K/UL (ref 1.5–8.1)
NITRITE UR QL STRIP: NEGATIVE
NRBC BLD-RTO: 0 PER 100 WBC
OPIATES UR QL SCN: NEGATIVE
OXYCODONE UR QL SCN: NEGATIVE
PCP UR QL SCN: NEGATIVE
PH UR STRIP: 6 [PH] (ref 5–8)
PLATELET # BLD AUTO: 413 K/UL (ref 138–453)
PMV BLD AUTO: 8.6 FL (ref 8.1–13.5)
POTASSIUM SERPL-SCNC: 4 MMOL/L (ref 3.7–5.3)
PROT SERPL-MCNC: 6.9 G/DL (ref 6.6–8.7)
PROT UR STRIP-MCNC: NEGATIVE MG/DL
RBC # BLD AUTO: 4.13 M/UL (ref 3.95–5.11)
RBC #/AREA URNS HPF: ABNORMAL /HPF (ref 0–4)
RSV RNA NPH QL NAA+NON-PROBE: NOT DETECTED
RV+EV RNA NPH QL NAA+NON-PROBE: NOT DETECTED
SARS-COV-2 RNA NPH QL NAA+NON-PROBE: NOT DETECTED
SODIUM SERPL-SCNC: 140 MMOL/L (ref 136–145)
SP GR UR STRIP: 1.02 (ref 1–1.03)
SPECIMEN DESCRIPTION: NORMAL
TEST INFORMATION: ABNORMAL
TSH SERPL DL<=0.05 MIU/L-ACNC: 3.19 UIU/ML (ref 0.27–4.2)
UROBILINOGEN UR STRIP-ACNC: NORMAL EU/DL (ref 0–1)
WBC #/AREA URNS HPF: ABNORMAL /HPF (ref 0–5)
WBC OTHER # BLD: 9.1 K/UL (ref 3.5–11.3)

## 2025-06-05 PROCEDURE — 2580000003 HC RX 258

## 2025-06-05 PROCEDURE — 94664 DEMO&/EVAL PT USE INHALER: CPT

## 2025-06-05 PROCEDURE — 83880 ASSAY OF NATRIURETIC PEPTIDE: CPT

## 2025-06-05 PROCEDURE — 80307 DRUG TEST PRSMV CHEM ANLYZR: CPT

## 2025-06-05 PROCEDURE — 6370000000 HC RX 637 (ALT 250 FOR IP)

## 2025-06-05 PROCEDURE — 85652 RBC SED RATE AUTOMATED: CPT

## 2025-06-05 PROCEDURE — 2500000003 HC RX 250 WO HCPCS

## 2025-06-05 PROCEDURE — 99222 1ST HOSP IP/OBS MODERATE 55: CPT | Performed by: INTERNAL MEDICINE

## 2025-06-05 PROCEDURE — 86140 C-REACTIVE PROTEIN: CPT

## 2025-06-05 PROCEDURE — 80048 BASIC METABOLIC PNL TOTAL CA: CPT

## 2025-06-05 PROCEDURE — 85025 COMPLETE CBC W/AUTO DIFF WBC: CPT

## 2025-06-05 PROCEDURE — 1200000000 HC SEMI PRIVATE

## 2025-06-05 PROCEDURE — 94640 AIRWAY INHALATION TREATMENT: CPT

## 2025-06-05 PROCEDURE — 99285 EMERGENCY DEPT VISIT HI MDM: CPT

## 2025-06-05 PROCEDURE — 81001 URINALYSIS AUTO W/SCOPE: CPT

## 2025-06-05 PROCEDURE — 6360000002 HC RX W HCPCS

## 2025-06-05 PROCEDURE — 83605 ASSAY OF LACTIC ACID: CPT

## 2025-06-05 PROCEDURE — 80076 HEPATIC FUNCTION PANEL: CPT

## 2025-06-05 PROCEDURE — 83036 HEMOGLOBIN GLYCOSYLATED A1C: CPT

## 2025-06-05 PROCEDURE — 87040 BLOOD CULTURE FOR BACTERIA: CPT

## 2025-06-05 PROCEDURE — 87641 MR-STAPH DNA AMP PROBE: CPT

## 2025-06-05 PROCEDURE — 0202U NFCT DS 22 TRGT SARS-COV-2: CPT

## 2025-06-05 PROCEDURE — 96365 THER/PROPH/DIAG IV INF INIT: CPT

## 2025-06-05 PROCEDURE — 71045 X-RAY EXAM CHEST 1 VIEW: CPT

## 2025-06-05 PROCEDURE — 73090 X-RAY EXAM OF FOREARM: CPT

## 2025-06-05 PROCEDURE — 84443 ASSAY THYROID STIM HORMONE: CPT

## 2025-06-05 RX ORDER — ACETAMINOPHEN 325 MG/1
650 TABLET ORAL EVERY 6 HOURS PRN
Status: DISCONTINUED | OUTPATIENT
Start: 2025-06-05 | End: 2025-06-06 | Stop reason: HOSPADM

## 2025-06-05 RX ORDER — UREA 200 MG/G
CREAM TOPICAL PRN
Status: DISCONTINUED | OUTPATIENT
Start: 2025-06-05 | End: 2025-06-05

## 2025-06-05 RX ORDER — IPRATROPIUM BROMIDE AND ALBUTEROL SULFATE 2.5; .5 MG/3ML; MG/3ML
1 SOLUTION RESPIRATORY (INHALATION)
Status: DISCONTINUED | OUTPATIENT
Start: 2025-06-05 | End: 2025-06-06 | Stop reason: HOSPADM

## 2025-06-05 RX ORDER — SODIUM CHLORIDE 9 MG/ML
INJECTION, SOLUTION INTRAVENOUS PRN
Status: DISCONTINUED | OUTPATIENT
Start: 2025-06-05 | End: 2025-06-06 | Stop reason: HOSPADM

## 2025-06-05 RX ORDER — POTASSIUM CHLORIDE 1500 MG/1
40 TABLET, EXTENDED RELEASE ORAL PRN
Status: DISCONTINUED | OUTPATIENT
Start: 2025-06-05 | End: 2025-06-06 | Stop reason: HOSPADM

## 2025-06-05 RX ORDER — ALBUTEROL SULFATE 90 UG/1
2 INHALANT RESPIRATORY (INHALATION) EVERY 6 HOURS PRN
Status: DISCONTINUED | OUTPATIENT
Start: 2025-06-05 | End: 2025-06-06 | Stop reason: HOSPADM

## 2025-06-05 RX ORDER — 0.9 % SODIUM CHLORIDE 0.9 %
1000 INTRAVENOUS SOLUTION INTRAVENOUS ONCE
Status: COMPLETED | OUTPATIENT
Start: 2025-06-05 | End: 2025-06-05

## 2025-06-05 RX ORDER — LABETALOL HYDROCHLORIDE 5 MG/ML
10 INJECTION, SOLUTION INTRAVENOUS EVERY 6 HOURS PRN
Status: DISCONTINUED | OUTPATIENT
Start: 2025-06-05 | End: 2025-06-06 | Stop reason: HOSPADM

## 2025-06-05 RX ORDER — ONDANSETRON 4 MG/1
4 TABLET, ORALLY DISINTEGRATING ORAL EVERY 8 HOURS PRN
Status: DISCONTINUED | OUTPATIENT
Start: 2025-06-05 | End: 2025-06-06 | Stop reason: HOSPADM

## 2025-06-05 RX ORDER — ONDANSETRON 2 MG/ML
4 INJECTION INTRAMUSCULAR; INTRAVENOUS EVERY 6 HOURS PRN
Status: DISCONTINUED | OUTPATIENT
Start: 2025-06-05 | End: 2025-06-06 | Stop reason: HOSPADM

## 2025-06-05 RX ORDER — ENOXAPARIN SODIUM 100 MG/ML
40 INJECTION SUBCUTANEOUS DAILY
Status: DISCONTINUED | OUTPATIENT
Start: 2025-06-05 | End: 2025-06-06 | Stop reason: HOSPADM

## 2025-06-05 RX ORDER — TACROLIMUS 1 MG/G
OINTMENT TOPICAL 2 TIMES DAILY
Status: DISCONTINUED | OUTPATIENT
Start: 2025-06-05 | End: 2025-06-06 | Stop reason: HOSPADM

## 2025-06-05 RX ORDER — SODIUM CHLORIDE 0.9 % (FLUSH) 0.9 %
5-40 SYRINGE (ML) INJECTION EVERY 12 HOURS SCHEDULED
Status: DISCONTINUED | OUTPATIENT
Start: 2025-06-05 | End: 2025-06-06 | Stop reason: HOSPADM

## 2025-06-05 RX ORDER — PANTOPRAZOLE SODIUM 40 MG/1
40 TABLET, DELAYED RELEASE ORAL
Status: DISCONTINUED | OUTPATIENT
Start: 2025-06-06 | End: 2025-06-06 | Stop reason: HOSPADM

## 2025-06-05 RX ORDER — POTASSIUM CHLORIDE 7.45 MG/ML
10 INJECTION INTRAVENOUS PRN
Status: DISCONTINUED | OUTPATIENT
Start: 2025-06-05 | End: 2025-06-06 | Stop reason: HOSPADM

## 2025-06-05 RX ORDER — ACETAMINOPHEN 650 MG/1
650 SUPPOSITORY RECTAL EVERY 6 HOURS PRN
Status: DISCONTINUED | OUTPATIENT
Start: 2025-06-05 | End: 2025-06-06 | Stop reason: HOSPADM

## 2025-06-05 RX ORDER — MAGNESIUM SULFATE IN WATER 40 MG/ML
2000 INJECTION, SOLUTION INTRAVENOUS PRN
Status: DISCONTINUED | OUTPATIENT
Start: 2025-06-05 | End: 2025-06-06 | Stop reason: HOSPADM

## 2025-06-05 RX ORDER — OXYCODONE AND ACETAMINOPHEN 5; 325 MG/1; MG/1
1 TABLET ORAL EVERY 6 HOURS PRN
Refills: 0 | Status: DISCONTINUED | OUTPATIENT
Start: 2025-06-05 | End: 2025-06-06 | Stop reason: HOSPADM

## 2025-06-05 RX ORDER — SODIUM CHLORIDE 0.9 % (FLUSH) 0.9 %
5-40 SYRINGE (ML) INJECTION PRN
Status: DISCONTINUED | OUTPATIENT
Start: 2025-06-05 | End: 2025-06-06 | Stop reason: HOSPADM

## 2025-06-05 RX ORDER — CLOBETASOL PROPIONATE 0.5 MG/G
OINTMENT TOPICAL 2 TIMES DAILY
Status: DISCONTINUED | OUTPATIENT
Start: 2025-06-05 | End: 2025-06-06 | Stop reason: HOSPADM

## 2025-06-05 RX ORDER — BUDESONIDE AND FORMOTEROL FUMARATE DIHYDRATE 160; 4.5 UG/1; UG/1
2 AEROSOL RESPIRATORY (INHALATION)
Status: DISCONTINUED | OUTPATIENT
Start: 2025-06-05 | End: 2025-06-06 | Stop reason: HOSPADM

## 2025-06-05 RX ORDER — POLYETHYLENE GLYCOL 3350 17 G/17G
17 POWDER, FOR SOLUTION ORAL DAILY PRN
Status: DISCONTINUED | OUTPATIENT
Start: 2025-06-05 | End: 2025-06-06 | Stop reason: HOSPADM

## 2025-06-05 RX ORDER — LOSARTAN POTASSIUM 50 MG/1
50 TABLET ORAL DAILY
Status: DISCONTINUED | OUTPATIENT
Start: 2025-06-05 | End: 2025-06-06

## 2025-06-05 RX ADMIN — IPRATROPIUM BROMIDE AND ALBUTEROL SULFATE 1 DOSE: .5; 2.5 SOLUTION RESPIRATORY (INHALATION) at 19:30

## 2025-06-05 RX ADMIN — CLOBETASOL PROPIONATE: 0.5 OINTMENT TOPICAL at 20:00

## 2025-06-05 RX ADMIN — PIPERACILLIN AND TAZOBACTAM 3375 MG: 3; .375 INJECTION, POWDER, LYOPHILIZED, FOR SOLUTION INTRAVENOUS at 21:21

## 2025-06-05 RX ADMIN — SODIUM CHLORIDE 1000 ML: 0.9 INJECTION, SOLUTION INTRAVENOUS at 12:37

## 2025-06-05 RX ADMIN — AMPICILLIN SODIUM AND SULBACTAM SODIUM 3000 MG: 2; 1 INJECTION, POWDER, FOR SOLUTION INTRAMUSCULAR; INTRAVENOUS at 19:01

## 2025-06-05 RX ADMIN — SODIUM CHLORIDE, PRESERVATIVE FREE 10 ML: 5 INJECTION INTRAVENOUS at 19:59

## 2025-06-05 RX ADMIN — OXYCODONE HYDROCHLORIDE AND ACETAMINOPHEN 1 TABLET: 5; 325 TABLET ORAL at 21:09

## 2025-06-05 RX ADMIN — AMPICILLIN SODIUM AND SULBACTAM SODIUM 3000 MG: 2; 1 INJECTION, POWDER, FOR SOLUTION INTRAMUSCULAR; INTRAVENOUS at 12:39

## 2025-06-05 RX ADMIN — BUDESONIDE AND FORMOTEROL FUMARATE DIHYDRATE 2 PUFF: 160; 4.5 AEROSOL RESPIRATORY (INHALATION) at 19:30

## 2025-06-05 RX ADMIN — METHYLPREDNISOLONE SODIUM SUCCINATE 40 MG: 40 INJECTION, POWDER, LYOPHILIZED, FOR SOLUTION INTRAMUSCULAR; INTRAVENOUS at 18:52

## 2025-06-05 RX ADMIN — TACROLIMUS: 1 OINTMENT TOPICAL at 19:59

## 2025-06-05 RX ADMIN — LOSARTAN POTASSIUM 50 MG: 50 TABLET, FILM COATED ORAL at 18:52

## 2025-06-05 ASSESSMENT — PAIN SCALES - GENERAL
PAINLEVEL_OUTOF10: 10
PAINLEVEL_OUTOF10: 3
PAINLEVEL_OUTOF10: 9

## 2025-06-05 ASSESSMENT — PAIN DESCRIPTION - DESCRIPTORS: DESCRIPTORS: ACHING;BURNING;DISCOMFORT

## 2025-06-05 ASSESSMENT — PAIN DESCRIPTION - LOCATION: LOCATION: ARM

## 2025-06-05 ASSESSMENT — PAIN DESCRIPTION - ORIENTATION: ORIENTATION: RIGHT

## 2025-06-05 ASSESSMENT — LIFESTYLE VARIABLES
HOW OFTEN DO YOU HAVE A DRINK CONTAINING ALCOHOL: NEVER
HOW MANY STANDARD DRINKS CONTAINING ALCOHOL DO YOU HAVE ON A TYPICAL DAY: PATIENT DOES NOT DRINK

## 2025-06-05 NOTE — ED NOTES
Writer received call from Annabelle of [facility] who stated they assisted patient with Medicaid application 4/1/25. Annabelle stated patient was approved today, [id number]. Annabelle stated patient should get a letter in the mail within the next couple weeks. Writer to have Registration run patient's insurance again & provide information to Case Management in case patient needs home care or SNF placement.

## 2025-06-05 NOTE — ED PROVIDER NOTES
Faculty Sign-Out Attestation  Handoff taken on the following patient from prior Attending Physician: Enoc    Note Started: 3:05 PM EDT    I was available and discussed any additional care issues that arose and coordinated the management plans with the resident(s) caring for the patient during my duty period. Any areas of disagreement with resident’s documentation of care or procedures are noted on the chart. I was personally present for the key portions of any/all procedures during my duty period. I have documented in the chart those procedures where I was not present during the key portions.    XR RADIUS ULNA RIGHT (2 VIEWS)   Final Result   1. No acute fracture or osseous abnormality.   2. Worsening soft tissue swelling of the right forearm with soft tissue laceration.                  David Beth MD  Attending Physician       David Beth MD  06/05/25 4402

## 2025-06-05 NOTE — CONSULTS
Orthopaedic Surgery Consult  (Dr. Ivan)    Time of Evaluation: 1:52 PM    CC/Reason for consult: Right forearm wound s/p irrigation debridement by Dr. Méndez 5/26/2025    HPI:      The patient is a 50 y.o. left-hand-dominant female that presented to [facility] emergency department originally on 5/25/2025 as a result of sustaining a large dog bite wound to the right forearm.  This dog bite did result in a large amount of soft tissue disruption which required operative intervention in the form of a thorough irrigation and debridement by Dr. Méndez.  At the conclusion of this irrigation debridement a small Prevena wound VAC was placed over the patient's wound to allow for drainage.  The patient's initial procedure and immediate postoperative course were not complicated and the patient was discharged from [facility] on 5/28/2025.  At that time the patient was prescribed a course of Augmentin.  The patient states that she took this course of antibiotic in its entirety, taking the last of the pills this morning.  The patient states that when she attempted to schedule an appointment with Dr. Méndez's office, they stated that she required a referral to be able to be seen.  Unsure what to do the patient called her PCP that suggested the patient presented to [facility]'s emergency department.  At the time of the patient's arrival to our emergency department today, orthopedic surgery was consulted.    On examination, the patient was resting comfortably in bed with the black sponge from the Prevena wound VAC still in place to the right forearm.  The patient does have the Prevena canister with her, however it is nonfunctioning.  The patient states that this canister has not been functioning for the last several hours.  There is a fall odor as well as purulent drainage coming from the patient's right forearm wound.  The black sponge was then removed to further assess the wound bed itself.  There was punctate  intervention indicated  -Plan f for admission to the observation unit for IV antibiotics and further evaluation  -Wet-to-dry dressing changes applied to the patient's right forearm wound, to be changed every day with wound care until the time of the patient's discharge from our facility.  -WBC 7.7  -WB status: WBAT to the RUE  -primary team: Obs  -Diet: Okay for an adult diet from an orthopedic perspective  -abx/tetanus: Augmentin at the time of discharge  -DVT ppx: EPC  -F/u VitD level  -Pain control per orthopedic surgery  -Ice and elevate extremity for pain and swelling  -PT/OT: To evaluate and treat  -Follow up patient has to follow-up with Dr. Méndez in his office on  -Please contact ortho with any questions      Josue Naik DO  Resident Physician, PGY-1   Orthopaedic Surgery  1:52 PM 6/5/2025    This note is created with the assistance of a speech recognition program. While intending to generate a document that actually reflects the content of the visit, the document can still have some errors including those of syntax and sound a like substitutions which may escape proof reading.  In such instances, actual meaning can be extrapolated by contextual diversion.

## 2025-06-05 NOTE — ED NOTES
Nuhng Call [birth year]  Recent admission from dog bite with surgery  Wound vac with no follow up, it is not working, and foul drainage  Outpatient pcp scheduled her for wound care on monday

## 2025-06-05 NOTE — ED NOTES
ED to inpatient nurses report      Chief Complaint: wound check from dog bite  Chief Complaint   Patient presents with    Wound Check     Present to ED from: home    MOA:     LOC: alert and orientated to name, place, date  Mobility: Requires assistance * 1  Oxygen Baseline: none    Current needs required: none   Pending ED orders: admit pt  Present condition: stable    Why did the patient come to the ED? Pt arrived to ER c/o less than week old Dog bite that was clean out and wound vac placed which stopped working today. Pt states \"wound vac stopped working 6/5\". Wound producing malodorous smell, redness and pain around wound area. Pt has no complaints of chest pain, non labored breathing, ambulating independently and alert x4.   What is the plan? Admit for observation  Any procedures or intervention occur? Wound bandaged  Any safety concerns?? Fall risk    Mental Status:  Level of Consciousness: Alert (0)    Psych Assessment:   Psychosocial  Psychosocial (WDL): Within Defined Limits  Vital signs   Vitals:    06/05/25 1204 06/05/25 1300 06/05/25 1430 06/05/25 1500   BP: (!) 164/91 (!) 148/84 (!) 144/87 (!) 140/89   Pulse: 59      Resp: 18      Temp: 97.5 °F (36.4 °C)      TempSrc: Oral      SpO2: 100% 96% 97% 97%        Vitals:  Patient Vitals for the past 24 hrs:   BP Temp Temp src Pulse Resp SpO2   06/05/25 1500 (!) 140/89 -- -- -- -- 97 %   06/05/25 1430 (!) 144/87 -- -- -- -- 97 %   06/05/25 1300 (!) 148/84 -- -- -- -- 96 %   06/05/25 1204 (!) 164/91 97.5 °F (36.4 °C) Oral 59 18 100 %      Visit Vitals  BP (!) 140/89   Pulse 59   Temp 97.5 °F (36.4 °C) (Oral)   Resp 18   SpO2 97%        LDAs:   Peripheral IV 06/05/25 Left;Proximal Forearm (Active)       Peripheral IV 06/05/25 Left Forearm (Active)       Ambulatory Status:  Presents to emergency department  because of falls (Syncope, seizure, or loss of consciousness): No, Age > 70: No, Altered Mental Status, Intoxication with alcohol or substance confusion

## 2025-06-05 NOTE — ED PROVIDER NOTES
[facility]     Emergency Department     Faculty Attestation  12:45 PM EDT      I performed a history and physical examination of the patient and discussed management with the resident. I have reviewed and agree with the resident’s findings including all diagnostic interpretations, and treatment plans as written. Any areas of disagreement are noted on the chart. I was personally present for the key portions of any procedures. I have documented in the chart those procedures where I was not present during the key portions. I have reviewed the emergency nurses triage note. I agree with the chief complaint, past medical history, past surgical history, allergies, medications, social and family history as documented unless otherwise noted below. Documentation of the HPI, Physical Exam and Medical Decision Making performed by scribshonda is based on my personal performance of the HPI, PE and MDM. For Physician Assistant/ Nurse Practitioner cases/documentation I have personally evaluated this patient and have completed at least one if not all key elements of the E/M (history, physical exam, and MDM). Additional findings are as noted.    Patient had a dog bite that required OR washout with Ortho.  Wound VAC was placed and patient was discharged home patient does take care of herself but it sounds like the wound VAC stopped functioning yesterday.  She was seen at PCP office today and it was noted erythema, with foul-smelling drainage from the wound.  Patient was directed to come over to the emergency room.  PCP did call to schedule a wound care appointment on Monday but directed her to come to the ER for due to concern for infection.    On exam and nonfunctioning wound VAC is in place, there is yellowish foul smelling drainage noted.  With erythema surrounding the wound.  Will speak with Ortho likely needs additional debridement, and IV antibiotics and admission.  And case

## 2025-06-05 NOTE — H&P
[facility]     Department of Internal Medicine - Staff Internal Medicine Teaching Service          ADMISSION NOTE/HISTORY AND PHYSICAL EXAMINATION   Date: 6/5/2025  Patient Name: Lisa BRYANT Call  Date of admission: 6/5/2025 11:50 AM  YOB: 1975  PCP: Rosemarie Das MD  History Obtained From:  patient    CHIEF COMPLAINT     Chief complaint: Right arm wound    HISTORY OF PRESENTING ILLNESS     The patient is a pleasant 50 y.o. female with a PMHx significant for     Psoriasis  Chronic smoker  Essential hypertension    presents with a chief complaint of right arm dog bite.  The patient was previously seen for right arm dog bite for which she underwent irrigation and debridement on 5/26 along with a wound VAC.  The patient was placed on Prevena wound VAC and was discharged to home with follow-up in the clinic.  She was given Augmentin on discharge.  Patient returns today for concerns for infection as well as nonfunctioning wound VAC drain.  The patient said that she was never instructed on how to take care of the wound VAC.  Patient could not follow-up outpatient with the orthopedic consultant either.  Patient was found heparin drainage coming out of the wound VAC after it was removed.  The patient underwent dressing with orthopedic surgeons and is being admitted for IV antibiotics as well as observation.    Patient denies change in bowel or urinary habits, chest pain, shortness of breath or abdominal pain.    Patient's past medical history includes essential hypertension for which she is placed on losartan recently by her PCP, eczematous or psoriatic rash for which she applies topical clobetasol and tacrolimus.  The patient has been using sensitive products for her skin but the rash was not improving.  Patient is also taking a prednisone taper which was supposed to be completed today.  Patient was supposed to initiate a taper of prednisone from 22 May.  Patient does have  EXAMINATION:  Constitutional: This is a well developed, well nourished, 30-34.9 - Obesity Grade I 50 y.o. year old female who is alert, oriented, cooperative and in no apparent distress.     Respiratory: Chest was symmetrical without dullness to percussion.  Patient has scattered wheezes bilaterally, expiratory in nature  Cardiovascular: Regular without murmur, clicks, gallops or rubs.   Abdomen: Slightly rounded and soft without organomegaly. No rebound, rigidity or guarding was appreciated.    Musculoskeletal: Normal curvature of the spine.  No gross muscle weakness.    Extremities:  No lower extremity edema, ulcerations, tenderness, varicosities or erythema.     Skin:  Warm and dry.  Good color, turgor and pigmentation.  Scattered excoriations, psoriatic lesions on eyelids, upper lip  Neurological/Psychiatric: The patient's general behavior, level of consciousness, thought content and emotional status is normal.          INVESTIGATIONS     Diagnostic Labs:  CBC:   Recent Labs     06/05/25  1254   WBC 9.1   RBC 4.13   HGB 13.0   HCT 39.7   MCV 96.1   RDW 14.4        BMP:   Recent Labs     06/05/25  1254      K 4.0      CO2 25   BUN 11   CREATININE 0.8     BNP: No results for input(s): \"BNP\" in the last 72 hours.  PT/INR: No results for input(s): \"PROTIME\", \"INR\" in the last 72 hours.  APTT: No results for input(s): \"APTT\" in the last 72 hours.  CARDIAC ENZYMES: No results for input(s): \"CKMB\", \"CKMBINDEX\", \"TROPONINI\" in the last 72 hours.    Invalid input(s): \"CKTOTAL;3\"  FASTING LIPID PANEL:No results found for: \"CHOL\", \"HDL\", \"TRIG\"  LIVER PROFILE: No results for input(s): \"AST\", \"ALT\", \"BILIDIR\", \"BILITOT\", \"ALKPHOS\" in the last 72 hours.    Invalid input(s): \"ALB\"   MICROBIOLOGY:   Lab Results   Component Value Date/Time    CULTURE NO GROWTH <24 HRS 06/05/2025 12:25 PM       Imaging:   XR RADIUS ULNA RIGHT (2 VIEWS)  Result Date: 6/5/2025  1. No acute fracture or osseous abnormality. 2.

## 2025-06-05 NOTE — ED NOTES
Pt arrived to ER c/o less than week old Dog bite that was clean out and wound vac placed which stopped working today. Pt states \"wound vac stopped working 6/5\". Wound producing malodorous smell, redness and pain around wound area. Pt has no complaints of chest pain, non labored breathing, ambulating independently and alert x4.

## 2025-06-05 NOTE — DISCHARGE INSTRUCTIONS
Orthopaedic Instructions:  -Weight bearing status: Weight bearing as tolerated with the right arm  - You have wet to dry dressing changes applied to the wound overlying your right forearm.  These dressings are to be changed twice a day .(Apply Wet gauze with saline then dry gauze the gauze roll wrap then ace wrap )  -Always look for signs of compartment syndrome: pain out of proportion to the injury, pain not controlled with pain medication, numbness in digits, changing of color of digits (paleness). If these signs occur return to ED immediately for reassessment.  -Always work on finger motion (to non-injured fingers) whileto decrease swelling.  -Ice (20 minutes on and off 1 hour) and elevate above the level of the heart to reduce swelling and throbbing pain.  -Call the office or come to Emergency Room if signs of infection appear (hot, swollen, red, draining pus, fever).  -Take medications as prescribed.  -Wean off narcotics (percocet/) as soon as possible. Do not take tylenol if still taking narcotics.  -Follow up with  Dr. Méndez  in his office June 10th at 940am  Follow up with Wound care center at [facility] wound care center on June 9th 3pm   Medicine instructions:   You came to the hospital for concerns of infected hand wound after dog bite.    Please start taking Augmentin 1 tablet by mouth 2 times daily for 7 days for hand infection  Please start your breathing treatments with Symbicort 2 puffs of your lungs in the morning and 2 in the evening along with albuterol as needed  Your blood pressure was elevated so please start taking Cozaar 100 mg daily.  Please continue to monitor blood pressure.  In case it remains persistently high please discuss it with your PCP  Please follow-up with PCP as advised  Please follow the orthopedic instructions as listed above  In case of any worsening symptoms within the nearest ED

## 2025-06-05 NOTE — ED PROVIDER NOTES
[facility] EMERGENCY DEPARTMENT  Emergency Department Encounter  Emergency Medicine Resident     Pt Name:Lisa Coppola  MRN: [id number]  Birthdate 1975  Date of evaluation: 6/5/25  PCP:  Rosemarie Das MD  Note Started: 12:31 PM EDT      CHIEF COMPLAINT       Chief Complaint   Patient presents with    Wound Check       HISTORY OF PRESENT ILLNESS  (Location/Symptom, Timing/Onset, Context/Setting, Quality, Duration, Modifying Factors, Severity.)      Lisa Coppola is a 50 y.o. female who presents with concern for wound infection.  Patient had a large dog bite wound to her right arm, admitted to the hospital.  Taken to the OR with orthopedic surgery, washed out and wound VAC was placed.  Patient was started on Augmentin.  She states over the last couple days her wound VAC stopped working.  Sent for concern for infection.  She does not have insurance, potentially homeless.  She denies any complaints right now including chest pain, shortness of breath, abdominal pain, nausea, vomiting, diarrhea, fever, chills.    PAST MEDICAL / SURGICAL / SOCIAL / FAMILY HISTORY      has a past medical history of Asthma, Carpal tunnel syndrome, Chronic kidney disease, COPD (chronic obstructive pulmonary disease) (HCC), Hypertension, and Liver disease.       has a past surgical history that includes pr excision bone mandible (N/A, 03/17/2017); laryngoscopy (06/17/2019); laryngoscopy (Bilateral, 06/17/2019); Forearm surgery (Right, 05/26/2025); and Arm Surgery (Right, 5/26/2025).      Social History     Socioeconomic History    Marital status:      Spouse name: Not on file    Number of children: Not on file    Years of education: Not on file    Highest education level: Not on file   Occupational History    Not on file   Tobacco Use    Smoking status: Every Day     Current packs/day: 0.50     Average packs/day: 0.5 packs/day for 15.0 years (7.5 ttl pk-yrs)     Types: Cigarettes    Smokeless tobacco: Never

## 2025-06-06 VITALS
RESPIRATION RATE: 16 BRPM | HEART RATE: 66 BPM | HEIGHT: 63 IN | OXYGEN SATURATION: 97 % | TEMPERATURE: 98.1 F | WEIGHT: 190 LBS | BODY MASS INDEX: 33.66 KG/M2 | DIASTOLIC BLOOD PRESSURE: 82 MMHG | SYSTOLIC BLOOD PRESSURE: 130 MMHG

## 2025-06-06 LAB
ANION GAP SERPL CALCULATED.3IONS-SCNC: 12 MMOL/L (ref 9–16)
BASOPHILS # BLD: <0.03 K/UL (ref 0–0.2)
BASOPHILS NFR BLD: 0 % (ref 0–2)
BUN SERPL-MCNC: 14 MG/DL (ref 6–20)
CALCIUM SERPL-MCNC: 8.5 MG/DL (ref 8.6–10.4)
CHLORIDE SERPL-SCNC: 102 MMOL/L (ref 98–107)
CO2 SERPL-SCNC: 20 MMOL/L (ref 20–31)
CREAT SERPL-MCNC: 0.7 MG/DL (ref 0.6–0.9)
EOSINOPHIL # BLD: <0.03 K/UL (ref 0–0.44)
EOSINOPHILS RELATIVE PERCENT: 0 % (ref 1–4)
ERYTHROCYTE [DISTWIDTH] IN BLOOD BY AUTOMATED COUNT: 14.2 % (ref 11.8–14.4)
GFR, ESTIMATED: >90 ML/MIN/1.73M2
GLUCOSE SERPL-MCNC: 102 MG/DL (ref 74–99)
HCT VFR BLD AUTO: 36.9 % (ref 36.3–47.1)
HGB BLD-MCNC: 12.8 G/DL (ref 11.9–15.1)
IMM GRANULOCYTES # BLD AUTO: 0.03 K/UL (ref 0–0.3)
IMM GRANULOCYTES NFR BLD: 0 %
LYMPHOCYTES NFR BLD: 2.1 K/UL (ref 1.1–3.7)
LYMPHOCYTES RELATIVE PERCENT: 25 % (ref 24–43)
MCH RBC QN AUTO: 31.9 PG (ref 25.2–33.5)
MCHC RBC AUTO-ENTMCNC: 34.7 G/DL (ref 28.4–34.8)
MCV RBC AUTO: 92 FL (ref 82.6–102.9)
MONOCYTES NFR BLD: 0.41 K/UL (ref 0.1–1.2)
MONOCYTES NFR BLD: 5 % (ref 3–12)
MRSA, DNA, NASAL: ABNORMAL
NEUTROPHILS NFR BLD: 70 % (ref 36–65)
NEUTS SEG NFR BLD: 5.98 K/UL (ref 1.5–8.1)
NRBC BLD-RTO: 0 PER 100 WBC
PLATELET # BLD AUTO: 421 K/UL (ref 138–453)
PMV BLD AUTO: 9.3 FL (ref 8.1–13.5)
POTASSIUM SERPL-SCNC: 4.5 MMOL/L (ref 3.7–5.3)
RBC # BLD AUTO: 4.01 M/UL (ref 3.95–5.11)
SODIUM SERPL-SCNC: 134 MMOL/L (ref 136–145)
SPECIMEN DESCRIPTION: ABNORMAL
WBC OTHER # BLD: 8.6 K/UL (ref 3.5–11.3)

## 2025-06-06 PROCEDURE — 6370000000 HC RX 637 (ALT 250 FOR IP)

## 2025-06-06 PROCEDURE — 87070 CULTURE OTHR SPECIMN AEROBIC: CPT

## 2025-06-06 PROCEDURE — 6360000002 HC RX W HCPCS

## 2025-06-06 PROCEDURE — 99254 IP/OBS CNSLTJ NEW/EST MOD 60: CPT | Performed by: INTERNAL MEDICINE

## 2025-06-06 PROCEDURE — 80048 BASIC METABOLIC PNL TOTAL CA: CPT

## 2025-06-06 PROCEDURE — 36415 COLL VENOUS BLD VENIPUNCTURE: CPT

## 2025-06-06 PROCEDURE — 87186 SC STD MICRODIL/AGAR DIL: CPT

## 2025-06-06 PROCEDURE — 99212 OFFICE O/P EST SF 10 MIN: CPT

## 2025-06-06 PROCEDURE — 2580000003 HC RX 258

## 2025-06-06 PROCEDURE — 86403 PARTICLE AGGLUT ANTBDY SCRN: CPT

## 2025-06-06 PROCEDURE — 87205 SMEAR GRAM STAIN: CPT

## 2025-06-06 PROCEDURE — 2500000003 HC RX 250 WO HCPCS

## 2025-06-06 PROCEDURE — 99232 SBSQ HOSP IP/OBS MODERATE 35: CPT | Performed by: INTERNAL MEDICINE

## 2025-06-06 PROCEDURE — 85025 COMPLETE CBC W/AUTO DIFF WBC: CPT

## 2025-06-06 RX ORDER — PANTOPRAZOLE SODIUM 40 MG/1
40 TABLET, DELAYED RELEASE ORAL
Qty: 30 TABLET | Refills: 3 | Status: SHIPPED | OUTPATIENT
Start: 2025-06-07

## 2025-06-06 RX ORDER — OXYCODONE AND ACETAMINOPHEN 5; 325 MG/1; MG/1
1 TABLET ORAL EVERY 6 HOURS PRN
Qty: 4 TABLET | Refills: 0 | Status: SHIPPED | OUTPATIENT
Start: 2025-06-06 | End: 2025-06-08

## 2025-06-06 RX ORDER — IPRATROPIUM BROMIDE AND ALBUTEROL SULFATE 2.5; .5 MG/3ML; MG/3ML
3 SOLUTION RESPIRATORY (INHALATION)
Qty: 360 ML | Refills: 1 | Status: CANCELLED | OUTPATIENT
Start: 2025-06-06

## 2025-06-06 RX ORDER — AMLODIPINE BESYLATE 5 MG/1
5 TABLET ORAL DAILY
Status: DISCONTINUED | OUTPATIENT
Start: 2025-06-06 | End: 2025-06-06

## 2025-06-06 RX ORDER — BLOOD PRESSURE TEST KIT
1 KIT MISCELLANEOUS 2 TIMES DAILY
Qty: 1 KIT | Refills: 0 | Status: SHIPPED | OUTPATIENT
Start: 2025-06-06

## 2025-06-06 RX ORDER — OXYCODONE AND ACETAMINOPHEN 5; 325 MG/1; MG/1
1 TABLET ORAL EVERY 6 HOURS PRN
Qty: 4 TABLET | Refills: 0 | Status: SHIPPED | OUTPATIENT
Start: 2025-06-06 | End: 2025-06-06

## 2025-06-06 RX ORDER — LOSARTAN POTASSIUM 50 MG/1
100 TABLET ORAL DAILY
Status: DISCONTINUED | OUTPATIENT
Start: 2025-06-07 | End: 2025-06-06 | Stop reason: HOSPADM

## 2025-06-06 RX ORDER — BUDESONIDE AND FORMOTEROL FUMARATE DIHYDRATE 160; 4.5 UG/1; UG/1
2 AEROSOL RESPIRATORY (INHALATION)
Qty: 10.2 G | Refills: 3 | Status: SHIPPED | OUTPATIENT
Start: 2025-06-06

## 2025-06-06 RX ORDER — LOSARTAN POTASSIUM 50 MG/1
100 TABLET ORAL DAILY
Qty: 30 TABLET | Refills: 3 | Status: SHIPPED | OUTPATIENT
Start: 2025-06-06

## 2025-06-06 RX ADMIN — TACROLIMUS: 1 OINTMENT TOPICAL at 08:28

## 2025-06-06 RX ADMIN — ENOXAPARIN SODIUM 40 MG: 100 INJECTION SUBCUTANEOUS at 08:27

## 2025-06-06 RX ADMIN — OXYCODONE HYDROCHLORIDE AND ACETAMINOPHEN 1 TABLET: 5; 325 TABLET ORAL at 03:46

## 2025-06-06 RX ADMIN — METHYLPREDNISOLONE SODIUM SUCCINATE 40 MG: 40 INJECTION, POWDER, LYOPHILIZED, FOR SOLUTION INTRAMUSCULAR; INTRAVENOUS at 05:27

## 2025-06-06 RX ADMIN — ACETAMINOPHEN 650 MG: 325 TABLET ORAL at 08:29

## 2025-06-06 RX ADMIN — PIPERACILLIN AND TAZOBACTAM 3375 MG: 3; .375 INJECTION, POWDER, LYOPHILIZED, FOR SOLUTION INTRAVENOUS at 05:36

## 2025-06-06 RX ADMIN — CLOBETASOL PROPIONATE: 0.5 OINTMENT TOPICAL at 08:27

## 2025-06-06 RX ADMIN — OXYCODONE HYDROCHLORIDE AND ACETAMINOPHEN 1 TABLET: 5; 325 TABLET ORAL at 10:56

## 2025-06-06 RX ADMIN — IPRATROPIUM BROMIDE AND ALBUTEROL SULFATE 1 DOSE: .5; 2.5 SOLUTION RESPIRATORY (INHALATION) at 08:21

## 2025-06-06 RX ADMIN — BUDESONIDE AND FORMOTEROL FUMARATE DIHYDRATE 2 PUFF: 160; 4.5 AEROSOL RESPIRATORY (INHALATION) at 08:21

## 2025-06-06 RX ADMIN — VANCOMYCIN HYDROCHLORIDE 750 MG: 750 INJECTION, POWDER, LYOPHILIZED, FOR SOLUTION INTRAVENOUS at 00:09

## 2025-06-06 RX ADMIN — VANCOMYCIN HYDROCHLORIDE 750 MG: 750 INJECTION, POWDER, LYOPHILIZED, FOR SOLUTION INTRAVENOUS at 08:26

## 2025-06-06 RX ADMIN — LOSARTAN POTASSIUM 50 MG: 50 TABLET, FILM COATED ORAL at 08:27

## 2025-06-06 RX ADMIN — PANTOPRAZOLE SODIUM 40 MG: 40 TABLET, DELAYED RELEASE ORAL at 05:28

## 2025-06-06 RX ADMIN — AMLODIPINE BESYLATE 5 MG: 5 TABLET ORAL at 08:26

## 2025-06-06 ASSESSMENT — PAIN DESCRIPTION - ORIENTATION
ORIENTATION: RIGHT

## 2025-06-06 ASSESSMENT — ENCOUNTER SYMPTOMS
NAUSEA: 0
ABDOMINAL PAIN: 0
DIARRHEA: 0
EYE DISCHARGE: 0
CONSTIPATION: 0
VOMITING: 0
APNEA: 0
COUGH: 0
COLOR CHANGE: 0
CHOKING: 0

## 2025-06-06 ASSESSMENT — PAIN SCALES - GENERAL
PAINLEVEL_OUTOF10: 9
PAINLEVEL_OUTOF10: 3
PAINLEVEL_OUTOF10: 9
PAINLEVEL_OUTOF10: 8
PAINLEVEL_OUTOF10: 9

## 2025-06-06 ASSESSMENT — PAIN DESCRIPTION - LOCATION
LOCATION: ARM
LOCATION: ARM
LOCATION: HAND

## 2025-06-06 ASSESSMENT — PAIN DESCRIPTION - DESCRIPTORS
DESCRIPTORS: ACHING
DESCRIPTORS: BURNING;ACHING
DESCRIPTORS: BURNING;ACHING

## 2025-06-06 NOTE — PLAN OF CARE
Wound looks great - ok for DC w 1 more week augmentin - reconsiled    Chelsie Ritter MD. Infectious Diseases

## 2025-06-06 NOTE — PROGRESS NOTES
Physical Therapy        Physical Therapy Cancel Note      DATE: 2025    NAME: Lisa Coppola  MRN: [id number]   : 1975      Patient not seen this date for Physical Therapy due to:    Patient independent with functional mobility. Will defer PT evaluation at this time. Please reorder PT if future needs arise.       Electronically signed by Bala Montoya PT on 2025 at 11:01 AM

## 2025-06-06 NOTE — PROGRESS NOTES
[facility]   Pharmacy Pharmacokinetic Monitoring Service - Vancomycin     Lisa Coppola is a 50 y.o. female starting on vancomycin therapy for bloodstream infection/surgical site infection. Pharmacy consulted by Andre Interiano  for monitoring and adjustment.    Target Concentration: Goal AUC/BUBBA 400-600 mg*hr/L    Additional Antimicrobials: Zosyn    Pertinent Laboratory Values:   Wt Readings from Last 1 Encounters:   06/05/25 86.2 kg (190 lb)     Temp Readings from Last 1 Encounters:   06/05/25 98 °F (36.7 °C) (Oral)     Estimated Creatinine Clearance: 86 mL/min (based on SCr of 0.8 mg/dL).  Recent Labs     06/05/25  1254   CREATININE 0.8   BUN 11   WBC 9.1     Procalcitonin: NA    MRSA Nasal Swab: N/A. Non-respiratory infection.    Plan:  Dosing recommendations based on Bayesian software  Start vancomycin 750 mg q8h  Anticipated AUC of 555 and trough concentration of 15.7 at steady state  Renal labs as indicated   Vancomycin concentration ordered for TBD   Pharmacy will continue to monitor patient and adjust therapy as indicated    Thank you for the consult,  Jerad Salazar RPH  6/5/2025 9:17 PM

## 2025-06-06 NOTE — CONSULTS
[facility] -   Infectious diseases evaluation  admission date 6/5/2025    reason for consultation:   Dog bite    Impression :   Current:  Infected R arm soft tissues / wound w myositis -  R arm dog bite and infected large wound  irrigation and debridement to the muscles on 5/26 w VAC w augmentin- no cx done - VAC did not work at home and foul smell emanated  Wound still open     Other:  CKD - COPPD - asthma -  Discussion / summary of stay / plan of care/ Recommendations:     HENCE:   On vanco and zosyn  Get cx from the wound to adjust AB  Ortho - no surg plans  Wound care per ortho  - wet to dry for now  6/6 wound clean and no signs of infection - stop all AB -ok for 7 more days of augmentin -  Wound cx so far neg    Infection Control Recommendations   [place] Precautions  Contact Isolation   Antimicrobial Stewardship Recommendations   Simplification of therapy  Targeted therapy      History of Present Illness:   Initial history:  Lisa Coppola is a 50 y.o.-year-old female with past medical history of  Psoriasis  Hypertension    Patient came in with a complaint of purulent and foul-smelling discharge from the right arm wound.Patient recently sustained a dog bite on the right arm and underwent irrigation and debridement on 5/26 with a wound VAC placement.Patient has completed a course of antibiotic.  Now she came in due to not functioning wound VAC and foul drainage from the wound.    Labs in ER were significant for CRP 4.1, ESR 34, WBC 8.6 and normal LFTs.    X-ray of right arm showed no acute fracture or osseous abnormality.  It showed worsening soft tissue swelling of the right forearm with soft tissue laceration.    ID is consulted for antibiotic recommendation.  Patient is currently on vancomycin and Zosyn.        Interval changes  6/6/2025   Patient Vitals for the past 8 hrs:   BP Pulse Resp   06/06/25 0416 -- -- 19   06/06/25 0019 (!) 153/78 70 --

## 2025-06-06 NOTE — PLAN OF CARE
Problem: Discharge Planning  Goal: Discharge to home or other facility with appropriate resources  6/6/2025 1542 by Ana M Campos RN  Outcome: Adequate for Discharge  6/6/2025 1541 by Ana M Campos RN  Outcome: Adequate for Discharge  6/6/2025 1541 by Ana M Campos RN  Outcome: Progressing  6/6/2025 0520 by Siobhan Smart RN  Outcome: Progressing     Problem: Pain  Goal: Verbalizes/displays adequate comfort level or baseline comfort level  6/6/2025 1542 by Ana M Campos RN  Outcome: Adequate for Discharge  6/6/2025 1541 by Ana M Campos RN  Outcome: Adequate for Discharge  6/6/2025 1541 by Ana M Campos RN  Outcome: Progressing  6/6/2025 0520 by Siobhan Smart RN  Outcome: Progressing     Problem: Safety - Adult  Goal: Free from fall injury  6/6/2025 1542 by Ana M Campos RN  Outcome: Adequate for Discharge  6/6/2025 1541 by Ana M Campos RN  Outcome: Adequate for Discharge  6/6/2025 1541 by Ana M Campos RN  Outcome: Progressing  6/6/2025 0520 by Siobhan Smart RN  Outcome: Progressing     Problem: Respiratory - Adult  Goal: Achieves optimal ventilation and oxygenation  6/6/2025 1542 by Ana M Campos RN  Outcome: Adequate for Discharge  6/6/2025 1541 by Ana M Campos RN  Outcome: Adequate for Discharge  6/6/2025 1541 by Ana M Campos RN  Outcome: Progressing

## 2025-06-06 NOTE — CARE COORDINATION
06/06/25 0955   Readmission Assessment   Number of Days since last admission? 8-30 days   Previous Disposition Home with Family   Who is being Interviewed Patient   What was the patient's/caregiver's perception as to why they think they needed to return back to the hospital? Did not realize care needs would be so extensive   Did you visit your Primary Care Physician after you left the hospital, before you returned this time? No   Why weren't you able to visit your PCP? Did not have an appointment   Did you see a specialist, such as Cardiac, Pulmonary, Orthopedic Physician, etc. after you left the hospital? No   Who advised the patient to return to the hospital? Self-referral   Does the patient report anything that got in the way of taking their medications? No   In our efforts to provide the best possible care to you and others like you, can you think of anything that we could have done to help you after you left the hospital the first time, so that you might not have needed to return so soon? Arrange for more help when leaving the hospital  (Home care was not set up.)

## 2025-06-06 NOTE — DISCHARGE INSTR - COC
Continuity of Care Form    Patient Name: Lisa BRYANT Call   :  1975  MRN:  [id number]    Admit date:  2025  Discharge date:  ***    Code Status Order: Full Code   Advance Directives:     Admitting Physician:  Mustapha Garcia MD  PCP: Rosemarie Das MD    Discharging Nurse: ***  Discharging Hospital Unit/Room#: 0237/0237-01  Discharging Unit Phone Number: ***    Emergency Contact:   Extended Emergency Contact Information  Primary Emergency Contact: Reyes Koroma  Home Phone: [phone]  Mobile Phone: [phone]  Relation: Child  Secondary Emergency Contact: Sunny Bergman  Home Phone: [phone]  Relation:     Past Surgical History:  Past Surgical History:   Procedure Laterality Date    ARM SURGERY Right 2025    FOREARM IRRIGATION AND DEBRIDEMENT,AND WOUND VAC PLACEMENT performed by Juan Miguel Méndez MD at [email or web address] OR    FOREARM SURGERY Right 2025    OREARM IRRIGATION AND DEBRIDEMENT, - Right    LARYNGOSCOPY  2019    EXCISION OF BILATERAL VOCAL CORD POLYPS     LARYNGOSCOPY Bilateral 2019    DIRECT LARYNGOSCOPY MICRO WITH EXCISION OF BILATERAL VOCAL CORD POLYPS performed by Sky Walter MD at [facility] [[email or web address]] OR    RI EXCISION BONE MANDIBLE N/A 2017    COMPLEX LACERATION REPAIR WITH DEBRIDEMENT performed by Oliver Zayas DDS at [facility] [[email or web address] OR       Immunization History:   Immunization History   Administered Date(s) Administered    Pneumococcal, PPSV23, PNEUMOVAX 23, (age 2y+), SC/IM, 0.5mL 2019    TDaP, ADACEL (age 10y-64y), BOOSTRIX (age 10y+), IM, 0.5mL 2017, 2025       Active Problems:  Patient Active Problem List   Diagnosis Code    Dog bite of face S01.85XA, W54.0XXA    Acute alcohol intoxication F10.929    Dog bite of finger S61.259A, W54.0XXA    COPD exacerbation (HCC) J44.1    Obesity E66.9    Essential hypertension I10    Alcohol abuse F10.10    Smoker F17.200    Non-compliance Z91.199    Cigarette nicotine dependence  F17.210    Lactic acidosis E87.20    Incision 19 Throat (Active)   Number of days: 2181        Elimination:  Continence:   Bowel: {YES / NO:}  Bladder: {YES / NO:}  Urinary Catheter: {Urinary Catheter:015799922}   Colostomy/Ileostomy/Ileal Conduit: {YES / NO:}       Date of Last BM: ***    Intake/Output Summary (Last 24 hours) at 2025 1802  Last data filed at 2025 1700  Gross per 24 hour   Intake 1580.56 ml   Output 800 ml   Net 780.56 ml     I/O last 3 completed shifts:  In: 500.6 [IV Piggyback:500.6]  Out: 400 [Urine:400]    Safety Concerns:     { NAOMIE Safety Concerns:822317672}    Impairments/Disabilities:      { NAOMIE Impairments/Disabilities:423311076}    Nutrition Therapy:  Current Nutrition Therapy:   { NAOMIE Diet List:223110358}    Routes of Feeding: {[facility] Other Feedings:500974895}  Liquids: {Slp liquid thickness:99281}  Daily Fluid Restriction: {[facility] DME Yes amt example:101660186}  Last Modified Barium Swallow with Video (Video Swallowing Test): {Done Not Done Date:325956865}    Treatments at the Time of Hospital Discharge:   Respiratory Treatments: ***  Oxygen Therapy:  {Therapy; copd oxygen:42966}  Ventilator:    { CC Vent List:674293013}    Rehab Therapies: {THERAPEUTIC INTERVENTION:7963497444}  Weight Bearing Status/Restrictions: {[facility] Weight Bearin}  Other Medical Equipment (for information only, NOT a DME order):  {EQUIPMENT:662413469}  Other Treatments: ***    Patient's personal belongings (please select all that are sent with patient):  {[facility] DME Belongings:879127162}    RN SIGNATURE:  {Esignature:300129094}    CASE MANAGEMENT/SOCIAL WORK SECTION    Inpatient Status Date: ***    Readmission Risk Assessment Score:  [facility] RISK OF UNPLANNED READMISSION 2.0             11.7 Total Score        Discharging to Facility/ Agency   Name:   Address:  Phone:  Fax:    Dialysis Facility (if applicable)   Name:  Address:  Dialysis Schedule:  Phone:  Fax:    / signature:

## 2025-06-06 NOTE — PLAN OF CARE
Problem: Discharge Planning  Goal: Discharge to home or other facility with appropriate resources  6/6/2025 1541 by Ana M Campos RN  Outcome: Adequate for Discharge  6/6/2025 1541 by Ana M Campos RN  Outcome: Progressing  6/6/2025 0520 by Siobhan Smart RN  Outcome: Progressing     Problem: Pain  Goal: Verbalizes/displays adequate comfort level or baseline comfort level  6/6/2025 1541 by Ana M Campos RN  Outcome: Adequate for Discharge  6/6/2025 1541 by Ana M Campos RN  Outcome: Progressing  6/6/2025 0520 by Siobhan Smart RN  Outcome: Progressing     Problem: Safety - Adult  Goal: Free from fall injury  6/6/2025 1541 by Ana M Campos RN  Outcome: Adequate for Discharge  6/6/2025 1541 by Ana M Campos RN  Outcome: Progressing  6/6/2025 0520 by Siobhan Smart RN  Outcome: Progressing     Problem: Respiratory - Adult  Goal: Achieves optimal ventilation and oxygenation  6/6/2025 1541 by Ana M Campos RN  Outcome: Adequate for Discharge  6/6/2025 1541 by Ana M Campos RN  Outcome: Progressing

## 2025-06-06 NOTE — PROGRESS NOTES
Discharge instructions read and given to patient with all questions answered. Discharged patient home via cab with all belongings.

## 2025-06-06 NOTE — PROGRESS NOTES
[facility]  Internal Medicine Teaching Residency Program  Inpatient Daily Progress Note  ______________________________________________________________________________    Patient: Lisa BRYANT Call  YOB: 1975   MRN:[id number]    Acct: [id number]     Room: 0237/0237-01  Admit date: 6/5/2025  Today's date: 06/06/25  Number of days in the hospital: 1    SUBJECTIVE   CC: Wound Check    Pt examined at bedside. Chart & results reviewed.   - Patient resting comfortably, pt is saturating well on room air  - Patient is hypertensive with systolics above 150 mmHg.  Patient did receive losartan at 7 PM yesterday however blood pressures remained elevated even after that.  - Labs reviewed: Urine drug screen positive for barbiturates and cannabinoids  - No acute events overnight.   - Patient denies abdominal pain and changes in bowel habits    ROS:  Negative except for above    Plan  Increase losartan if patient remains hypertensive  DC steroids    BRIEF HISTORY     The patient is a pleasant 50 y.o. female with a PMHx significant for      Psoriasis  Chronic smoker  Essential hypertension     presents with a chief complaint of right arm dog bite.  The patient was previously seen for right arm dog bite for which she underwent irrigation and debridement on 5/26 along with a wound VAC.  The patient was placed on Prevena wound VAC and was discharged to home with follow-up in the clinic.  She was given Augmentin on discharge.  Patient returns today for concerns for infection as well as nonfunctioning wound VAC drain.  The patient said that she was never instructed on how to take care of the wound VAC.  Patient could not follow-up outpatient with the orthopedic consultant either.  Patient was found heparin drainage coming out of the wound VAC after it was removed.  The patient underwent dressing with orthopedic surgeons and is being admitted for IV antibiotics as well as  input(s): \"PROTIME\", \"INR\" in the last 72 hours.  APTT: No results for input(s): \"APTT\" in the last 72 hours.  CARDIAC ENZYMES: No results for input(s): \"CKMB\", \"CKMBINDEX\", \"TROPONINI\" in the last 72 hours.    Invalid input(s): \"CKTOTAL;3\"  FASTING LIPID PANEL:No results found for: \"CHOL\", \"HDL\", \"TRIG\"  LIVER PROFILE:   Recent Labs     06/05/25  1254   AST 17   ALT 19   BILIDIR 0.1   BILITOT 0.2   ALKPHOS 75      MICROBIOLOGY:   Lab Results   Component Value Date/Time    CULTURE NO GROWTH 12 HOURS 06/05/2025 12:25 PM       Imaging:    XR CHEST PORTABLE  Result Date: 6/5/2025  1. No acute process.     XR RADIUS ULNA RIGHT (2 VIEWS)  Result Date: 6/5/2025  1. No acute fracture or osseous abnormality. 2. Worsening soft tissue swelling of the right forearm with soft tissue laceration.       ASSESSMENT & PLAN     Principal Problem:    Wound infection  Resolved Problems:    * No resolved hospital problems. *    Right hand dog bite  Wound infection  Status post irrigation debridement 5/26  Status post wound VAC  Nonfunctioning wound VAC drain  Wet-to-dry dressing applied  Wound care every shift  Follow-up blood culture  IV Zosyn 2 weeks  ID consulted: Appreciate recs  Will continue to monitor  Orthopedics on board     History of chronic smoking  COPD/asthma  Start DuoNeb 4 times daily  Start Solu-Medrol 40 mg every 12 hours  Albuterol every 6 as needed  Symbicort twice daily  CXR: No acute process  Respiratory panel negative       Hx of MDR UTI  Urinalysis normal    Essential hypertension  Resume losartan 50 mg daily  Consider adding amlodipine 5 mg daily     Psoriatic rash  Resume tacrolimus and clobetasol cream     DVT ppx: EPC cuffs  GI ppx: not indicated     PT/OT/SW: Consulted  Discharge Planning: underway    Andre Interiano MD  PGY-1, Internal Medicine Resident  [facility] [place]         6/6/2025, 4:52 AM

## 2025-06-06 NOTE — PROGRESS NOTES
CLINICAL PHARMACY NOTE: MEDS TO BEDS    Total # of Prescriptions Filled: 4   The following medications were delivered to the patient:  Symbicort  Losartan  Pantoprazole  augmentin    Additional Documentation:

## 2025-06-06 NOTE — PLAN OF CARE
Problem: Discharge Planning  Goal: Discharge to home or other facility with appropriate resources  Outcome: Progressing     Problem: Pain  Goal: Verbalizes/displays adequate comfort level or baseline comfort level  Outcome: Progressing     Problem: Safety - Adult  Goal: Free from fall injury  Outcome: Progressing

## 2025-06-06 NOTE — PROGRESS NOTES
[facility]  Nurse  Consult Note       NAME:  Lisa Coppola  MEDICAL RECORD NUMBER:  [id number]  AGE: 50 y.o.   GENDER: female  : 1975  TODAY'S DATE:  2025    Subjective   Reason for [facility] Nurse Evaluation and Assessment: \"wound care management\"        Lisa Coppola is a 50 y.o. female referred by:   [x] Physician  [] Nursing  [] Other:     Wound History:     Copied form H&P:  \"50 years old female with recent admission for dog bite, underwent I&D at that time along with wound VAC placement.  Came back again, wound VAC was not working, evaluated by orthopedics in the emergency department.  Continue wound care per orthopedics\"    Current Wound Care Treatment:  Saline moistened gauze BID.Discharging to home.  Follow up plan TBD     Plan   Plan of Care: Current Wound Care Treatment:  Saline moistened gauze BID.Discharging to home.  Follow up plan TBD   Recently approved Medicaid; plan does not include home care or NPWT.    [facility] nurse will sign off.  Nursing staff to perform care.  Suggest referral to  [facility] for continued management.  Will provide initial supplies for dressing changes for transition home.

## 2025-06-06 NOTE — CARE COORDINATION
Case Management Assessment  Initial Evaluation    Date/Time of Evaluation: 6/6/2025 3:46 PM  Assessment Completed by: SANJUANITA BORRERO    If patient is discharged prior to next notation, then this note serves as note for discharge by case management.    Patient Name: Lisa Coppola                   YOB: 1975  Diagnosis: Wound infection [T14.8XXA, L08.9]                   Date / Time: 6/5/2025 11:50 AM    Patient Admission Status: Inpatient   Readmission Risk (Low < 19, Mod (19-27), High > 27): Readmission Risk Score: 11    Current PCP: Rosemarie Das MD  PCP verified by CM? (P) No    Chart Reviewed: Yes      History Provided by: (P) Patient  Patient Orientation: (P) Alert and Oriented, Person, Place, Situation, Self    Patient Cognition: (P) Alert    Hospitalization in the last 30 days (Readmission):  Yes    If yes, Readmission Assessment in  Navigator will be completed.    Advance Directives:      Code Status: Full Code   Patient's Primary Decision Maker is: (P) Legal Next of Kin      Discharge Planning:    Patient lives with: (P) Family Members Type of Home: (P) House  Primary Care Giver: (P) Self  Patient Support Systems include: (P) Family Members   Current Financial resources: (P) Medicaid  Current community resources:    Current services prior to admission: (P) None            Current DME:              Type of Home Care services:  (P) None    ADLS  Prior functional level:    Current functional level: (P) Independent in ADLs/IADLs    PT AM-PAC:   /24  OT AM-PAC:   /24    Family can provide assistance at DC: (P) Yes  Would you like Case Management to discuss the discharge plan with any other family members/significant others, and if so, who? (P) No  Plans to Return to Present Housing: (P) Yes  Other Identified Issues/Barriers to RETURNING to current housing: No  Potential Assistance needed at discharge: (P) Home Care            Potential DME:    Patient expects to discharge to: (P)  House  Plan for transportation at discharge: (P) Family    Financial    Payor: MEDICAID [place] / Plan: MEDICAID [facility] DEPT OF JOB / Product Type: *No Product type* /     Does insurance require precert for SNF: Yes    Potential assistance Purchasing Medications: (P) No  Meds-to-Beds request: Yes      [facility] [id number] - [place], [place] - [address] - P [phone] - F [phone]  [address]  [place] [address]  Phone: [phone] Fax: [phone]    [facility] - [place], [place] - [address] - P [phone] - F [phone]  [address] [facility] [place] [address]  Phone: [phone] Fax: [phone]      Notes:    Factors facilitating achievement of predicted outcomes: Family support, Motivated, Cooperative, and Pleasant    Barriers to discharge: Limited insight into deficits, Upper extremity weakness, and Wound Care    Additional Case Management Notes: Discussed the transition plan with the patient. States she did not have home care when she discharged in May. States she did not know what to do with the Pervena wound vac. States she lives with her step dad. He can help her change the dressing on her arm if she goes home with a wet to dry type dressng.    The Plan for Transition of Care is related to the following treatment goals of Wound infection [T14.8XXA, L08.9]    IF APPLICABLE: The Patient and/or patient representative Lisa and her family were provided with a choice of provider and agrees with the discharge plan. Freedom of choice list with basic dialogue that supports the patient's individualized plan of care/goals and shares the quality data associated with the providers was provided to:     Patient Representative Name:       The Patient and/or Patient Representative Agree with the Discharge Plan?      SANJUANITA BORRERO  Case Management Department  Ph:  Fax:    Case Management Services Information Letter Provided [x]

## 2025-06-08 LAB
MICROORGANISM SPEC CULT: ABNORMAL
MICROORGANISM SPEC CULT: NORMAL
MICROORGANISM SPEC CULT: NORMAL
MICROORGANISM/AGENT SPEC: ABNORMAL
MICROORGANISM/AGENT SPEC: ABNORMAL
SERVICE CMNT-IMP: ABNORMAL
SERVICE CMNT-IMP: NORMAL
SERVICE CMNT-IMP: NORMAL
SPECIMEN DESCRIPTION: ABNORMAL
SPECIMEN DESCRIPTION: NORMAL
SPECIMEN DESCRIPTION: NORMAL

## 2025-06-09 ENCOUNTER — HOSPITAL ENCOUNTER (OUTPATIENT)
Dept: WOUND CARE | Age: 50
Discharge: HOME | End: 2025-06-09
Payer: MEDICAID

## 2025-06-09 VITALS
HEART RATE: 66 BPM | BODY MASS INDEX: 33.66 KG/M2 | HEIGHT: 63 IN | DIASTOLIC BLOOD PRESSURE: 98 MMHG | RESPIRATION RATE: 18 BRPM | WEIGHT: 190 LBS | SYSTOLIC BLOOD PRESSURE: 147 MMHG | TEMPERATURE: 98.1 F

## 2025-06-09 DIAGNOSIS — S41.151D DOG BITE OF RIGHT UPPER EXTREMITY, SUBSEQUENT ENCOUNTER: ICD-10-CM

## 2025-06-09 DIAGNOSIS — F17.200 SMOKER: ICD-10-CM
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
## 2025-06-09 DIAGNOSIS — S51.801D: Primary | ICD-10-CM

## 2025-06-09 PROCEDURE — 99202 OFFICE O/P NEW SF 15 MIN: CPT | Performed by: NURSE PRACTITIONER

## 2025-06-09 PROCEDURE — 99213 OFFICE O/P EST LOW 20 MIN: CPT

## 2025-06-09 ASSESSMENT — ENCOUNTER SYMPTOMS
VOMITING: 0
NAUSEA: 0
COUGH: 0
SHORTNESS OF BREATH: 0
DIARRHEA: 0
RHINORRHEA: 0

## 2025-06-09 ASSESSMENT — PAIN DESCRIPTION - DESCRIPTORS: DESCRIPTORS: ACHING;SORE;THROBBING

## 2025-06-09 ASSESSMENT — PAIN DESCRIPTION - FREQUENCY: FREQUENCY: INTERMITTENT

## 2025-06-09 ASSESSMENT — PAIN DESCRIPTION - ORIENTATION: ORIENTATION: RIGHT;LOWER

## 2025-06-09 ASSESSMENT — PAIN DESCRIPTION - ONSET: ONSET: ON-GOING

## 2025-06-09 ASSESSMENT — PAIN DESCRIPTION - LOCATION: LOCATION: ARM

## 2025-06-09 ASSESSMENT — PAIN DESCRIPTION - PAIN TYPE: TYPE: CHRONIC PAIN

## 2025-06-09 ASSESSMENT — PAIN - FUNCTIONAL ASSESSMENT: PAIN_FUNCTIONAL_ASSESSMENT: PREVENTS OR INTERFERES SOME ACTIVE ACTIVITIES AND ADLS

## 2025-06-09 ASSESSMENT — PAIN SCALES - GENERAL: PAINLEVEL_OUTOF10: 9

## 2025-06-09 NOTE — PROGRESS NOTES
06/09/25 Arm Right;Lower wound #1 right lower arm (Active)   Wound Image   06/09/25 1429   Wound Etiology Traumatic 06/09/25 1429   Dressing Status New drainage noted;Old drainage noted 06/09/25 1429   Wound Cleansed Cleansed with saline 06/09/25 1429   Wound Length (cm) 8 cm 06/09/25 1429   Wound Width (cm) 10 cm 06/09/25 1429   Wound Depth (cm) 0.6 cm 06/09/25 1429   Wound Surface Area (cm^2) 80 cm^2 06/09/25 1429   Wound Volume (cm^3) 48 cm^3 06/09/25 1429   Post-Procedure Length (cm) 8 cm 06/09/25 1429   Post-Procedure Width (cm) 10 cm 06/09/25 1429   Post-Procedure Depth (cm) 0.6 cm 06/09/25 1429   Post-Procedure Surface Area (cm^2) 80 cm^2 06/09/25 1429   Post-Procedure Volume (cm^3) 48 cm^3 06/09/25 1429   Wound Assessment Devitalized tissue;Exposed structure fascia;Exposed structure muscle;Exposed structure tendon;Pink/red;Slough 06/09/25 1429   Drainage Amount Moderate (25-50%) 06/09/25 1429   Drainage Description Serosanguinous;Yellow 06/09/25 1429   Odor None 06/09/25 1429   Lakisha-wound Assessment Blanchable erythema;Fragile;Excoriated 06/09/25 1429   Margins Undefined edges 06/09/25 1429   Number of days: 0           Plan:     Treatment Note please see Discharge Instructions    Written patient dismissal instructions given to patient and signed by patient or POA.           Electronically signed by MICHELLE SAUCEDO CNP on 6/9/2025 at 3:21 PM

## 2025-06-10 LAB
MICROORGANISM SPEC CULT: NORMAL
MICROORGANISM SPEC CULT: NORMAL
SERVICE CMNT-IMP: NORMAL
SERVICE CMNT-IMP: NORMAL
SPECIMEN DESCRIPTION: NORMAL
SPECIMEN DESCRIPTION: NORMAL

## 2025-06-11 ENCOUNTER — RESULTS FOLLOW-UP (OUTPATIENT)
Dept: INFECTIOUS DISEASES | Age: 50
End: 2025-06-11

## 2025-06-11 DIAGNOSIS — A49.02 MRSA INFECTION: Primary | ICD-10-CM

## 2025-06-11 RX ORDER — DOXYCYCLINE HYCLATE 100 MG
100 TABLET ORAL 2 TIMES DAILY
Qty: 14 TABLET | Refills: 0 | Status: SHIPPED | OUTPATIENT
Start: 2025-06-11 | End: 2025-06-18

## 2025-06-12 NOTE — DISCHARGE SUMMARY
etc.      Andre Interiano MD,  Internal Medicine Resident, PGY-1  [facility]; [place], [place]  6/12/2025, 11:31 AM

## 2025-06-20 ENCOUNTER — HOSPITAL ENCOUNTER (EMERGENCY)
Age: 50
Discharge: HOME | End: 2025-06-20
Attending: EMERGENCY MEDICINE
Payer: MEDICAID

## 2025-06-20 VITALS
OXYGEN SATURATION: 99 % | HEART RATE: 79 BPM | DIASTOLIC BLOOD PRESSURE: 101 MMHG | RESPIRATION RATE: 18 BRPM | SYSTOLIC BLOOD PRESSURE: 145 MMHG | TEMPERATURE: 97.7 F

## 2025-06-20 DIAGNOSIS — S41.151S: Primary | ICD-10-CM
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
## 2025-06-20 PROCEDURE — 99283 EMERGENCY DEPT VISIT LOW MDM: CPT | Performed by: EMERGENCY MEDICINE

## 2025-06-20 PROCEDURE — 6370000000 HC RX 637 (ALT 250 FOR IP): Performed by: STUDENT IN AN ORGANIZED HEALTH CARE EDUCATION/TRAINING PROGRAM

## 2025-06-20 RX ORDER — OXYCODONE HYDROCHLORIDE 5 MG/1
2.5-5 TABLET ORAL EVERY 6 HOURS PRN
Qty: 12 TABLET | Refills: 0 | Status: SHIPPED | OUTPATIENT
Start: 2025-06-20 | End: 2025-06-23

## 2025-06-20 RX ORDER — DIPHENHYDRAMINE HCL 25 MG
25 CAPSULE ORAL EVERY 6 HOURS PRN
Qty: 12 CAPSULE | Refills: 0 | Status: SHIPPED | OUTPATIENT
Start: 2025-06-20 | End: 2025-06-30

## 2025-06-20 RX ORDER — ACETAMINOPHEN 500 MG
1000 TABLET ORAL ONCE
Status: COMPLETED | OUTPATIENT
Start: 2025-06-20 | End: 2025-06-20

## 2025-06-20 RX ORDER — ACETAMINOPHEN 500 MG
1000 TABLET ORAL EVERY 8 HOURS PRN
Qty: 30 TABLET | Refills: 0 | Status: SHIPPED | OUTPATIENT
Start: 2025-06-20

## 2025-06-20 RX ORDER — OXYCODONE HYDROCHLORIDE 5 MG/1
2.5 TABLET ORAL ONCE
Refills: 0 | Status: COMPLETED | OUTPATIENT
Start: 2025-06-20 | End: 2025-06-20

## 2025-06-20 RX ADMIN — ACETAMINOPHEN 1000 MG: 500 TABLET ORAL at 11:22

## 2025-06-20 RX ADMIN — OXYCODONE 2.5 MG: 5 TABLET ORAL at 11:22

## 2025-06-20 ASSESSMENT — LIFESTYLE VARIABLES
HOW MANY STANDARD DRINKS CONTAINING ALCOHOL DO YOU HAVE ON A TYPICAL DAY: 1 OR 2
HOW OFTEN DO YOU HAVE A DRINK CONTAINING ALCOHOL: MONTHLY OR LESS

## 2025-06-20 ASSESSMENT — PAIN SCALES - GENERAL: PAINLEVEL_OUTOF10: 6

## 2025-06-20 ASSESSMENT — PAIN - FUNCTIONAL ASSESSMENT: PAIN_FUNCTIONAL_ASSESSMENT: 0-10

## 2025-06-20 NOTE — PLAN OF CARE
I signed up for this patient in error. I did not see this patient. I did not participate in the evlauation or treatment of this patient.    Electronically signed by Enio Ivan DO on 6/20/2025 at 10:46 AM     no

## 2025-06-20 NOTE — ED NOTES
Pt to ed via ambulatory to room with complaints of right fore arm pain near wound from a dog bite from may 25th of this year  Pt states she is running out of supplies to do dressings  Pt states pain 8/10 aching cont  Pt denies any fevers at home  Pt states pain has increased  Pt states she doesn't have any more percocets at home   Pt states her tetanus is UTD  Pt states she is itchy from diffuse psoriasis   Pt states she was told to stop taking her benadryl and prednisone   Pt states wants to take it again because it is was has helped in the past  Pt alert and oriented x4, talking in complete sentences, respirations even and unlabored. Pt acting age appropriate. White board updated, will continue to plan of care

## 2025-06-20 NOTE — DISCHARGE INSTRUCTIONS
You were seen in the Emergency Department today due to concern for pain to your right arm.  Your wound appears to be healing well with no signs of new or worsening infection.    You will be prescribed Tylenol and oxycodone to use as needed for pain.  Please use the lowest dose possible of oxycodone.    Please note Oxycodone and/or Benadryl may make you drowsy.  This effect is compounded if you take them together.  Please use caution when taking this medication.  If you are frequently in scenarios that require you to operate a motor vehicle, operate heavy machinery, or be fully alert, please ensure that you are certain this medication does not cause you to be drowsy or exert any other negative side effects.

## 2025-06-20 NOTE — ED PROVIDER NOTES
[facility] EMERGENCY DEPARTMENT  Emergency Department Encounter  Emergency Medicine Resident     Pt Name:Lisa Coppola  MRN: [id number]  Birthdate 1975  Date of evaluation: 6/20/25  PCP:  Rosemarie Das MD  Note Started: 10:56 AM EDT      CHIEF COMPLAINT       Chief Complaint   Patient presents with    Wound Check       HISTORY OF PRESENT ILLNESS  (Location/Symptom, Timing/Onset, Context/Setting, Quality, Duration, Modifying Factors, Severity.)      Lisa Coppola is a 50 y.o. female for assessment of a wound infection to the right arm.  This patient sustained a dog bite in late May to the area.  She was started on antibiotics but unfortunately developed worsening infection.  Return to the ER in early June underwent debridement as well as IV antibiotics on an admission from 6/5 to 6/6.  She was discharged with 7 days of Augmentin.  Patient stating that she is having worsening pain to the area today.  She was previously taking Percocets but has since run out.  She is also complaining of diffuse pruritus which is secondary to eczema.  The patient was previously on prednisone Benadryl and topical tacrolimus for this problem.  She was instructed to stop taking prednisone likely secondary to infection and poor healing.  Patient has a follow-up appointment with her wound care doctor in 4 days.    PAST MEDICAL / SURGICAL / SOCIAL / FAMILY HISTORY      has a past medical history of Asthma, Carpal tunnel syndrome, Chronic kidney disease, COPD (chronic obstructive pulmonary disease) (HCC), Hypertension, and Liver disease.        has a past surgical history that includes pr excision bone mandible (N/A, 03/17/2017); laryngoscopy (06/17/2019); laryngoscopy (Bilateral, 06/17/2019); Forearm surgery (Right, 05/26/2025); and Arm Surgery (Right, 5/26/2025).       Social History     Socioeconomic History    Marital status:      Spouse name: Not on file    Number of children: 2    Years of education: Not on

## 2025-06-20 NOTE — ED PROVIDER NOTES
[facility] EMERGENCY DEPARTMENT     Emergency Department     Faculty Attestation    I performed a history and physical examination of the patient and discussed management with the resident. I reviewed the resident’s note and agree with the documented findings and plan of care. Any areas of disagreement are noted on the chart. I was personally present for the key portions of any procedures. I have documented in the chart those procedures where I was not present during the key portions. I have reviewed the emergency nurses triage note. I agree with the chief complaint, past medical history, past surgical history, allergies, medications, social and family history as documented unless otherwise noted below. For Physician Assistant/ Nurse Practitioner cases/documentation I have personally evaluated this patient and have completed at least one if not all key elements of the E/M (history, physical exam, and MDM). Additional findings are as noted.    11:15 AM EDT    Patient presents requesting a wound check of her right forearm.  Patient had a dog bite to the area on May 25 of this year.  Patient has been following with Dr. Ureña for this and her next appointment is in 4 days.  She says that she has run out of wound supplies and pain medication.  She says she has significant pain when trying to change the wound dressing.  She denies any fever, chills, nausea or vomiting.  See media section of chart for picture of the wound.  It does appear similar to what it look like earlier this month.  Patient does have diffuse psoriatic rash to her skin.  Will provide patient with additional wound care supplies and pain medication.  Will treat the itchiness with Benadryl and have her keep her appointment in 4 days      Diana Virgen MD  Attending Emergency  Physician

## (undated) DEVICE — PAD,NON-ADHERENT,3X8,STERILE,LF,1/PK: Brand: MEDLINE

## (undated) DEVICE — DRAPE,REIN 53X77,STERILE: Brand: MEDLINE

## (undated) DEVICE — CONTROL SYRINGE LUER-LOCK TIP: Brand: MONOJECT

## (undated) DEVICE — DRAPE,U/ SHT,SPLIT,PLAS,STERIL: Brand: MEDLINE

## (undated) DEVICE — SUTURE VCRL SZ 3-0 L18IN ABSRB UD W/O NDL POLYGLACTIN 910 J110T

## (undated) DEVICE — TUBING, SUCTION, 9/32" X 20', STRAIGHT: Brand: MEDLINE INDUSTRIES, INC.

## (undated) DEVICE — GLOVE ORANGE PI 8   MSG9080

## (undated) DEVICE — CONTAINER,SPECIMEN,4OZ,OR STRL: Brand: MEDLINE

## (undated) DEVICE — DRESSING WND VAC SM GRANUFOAM SENSATRAC

## (undated) DEVICE — STRAP,POSITIONING,KNEE/BODY,FOAM,4X60": Brand: MEDLINE

## (undated) DEVICE — PATIENT RETURN ELECTRODE, SINGLE-USE, CONTACT QUALITY MONITORING, ADULT, WITH 9FT CORD, FOR PATIENTS WEIGING OVER 33LBS. (15KG): Brand: MEGADYNE

## (undated) DEVICE — GARMENT,MEDLINE,DVT,INT,CALF,MED, GEN2: Brand: MEDLINE

## (undated) DEVICE — 60-7070-103 TRNQT,DPSB,PLC RED: Brand: MEDLINE RENEWAL

## (undated) DEVICE — Device

## (undated) DEVICE — GOWN,AURORA,NONREINFORCED,LARGE: Brand: MEDLINE

## (undated) DEVICE — GAUZE,SPONGE,FLUFF,6"X6.75",STRL,5/TRAY: Brand: MEDLINE

## (undated) DEVICE — GLOVE SURG SZ 7 L12IN THK7.5MIL DK GRN LTX FREE MSG6570] MEDLINE INDUSTRIES INC]

## (undated) DEVICE — GAUZE,SPONGE,4"X4",16PLY,XRAY,STRL,LF: Brand: MEDLINE

## (undated) DEVICE — GARMENT COMPR STD FOR 17IN CALF UNIF THER FLOTRN

## (undated) DEVICE — ELECTRODE ELECSURG NDL 2.8 INX7.2 CM COAT INSUL EDGE

## (undated) DEVICE — DISCONTINUED USE 127221 SUTURE SILK PRMHND ETHLN BR BLK REV 2-0 18 685H

## (undated) DEVICE — SYRINGE, LUER LOCK, 10ML: Brand: MEDLINE

## (undated) DEVICE — GLOVE SURG SZ 65 THK91MIL LTX FREE SYN POLYISOPRENE

## (undated) DEVICE — SUTURE MCRYL SZ 4-0 L18IN ABSRB UD P-3 L13MM 3/8 CIR PRIM Y494G

## (undated) DEVICE — BANDAGE,GAUZE,BULKEE II,4.5"X4.1YD,STRL: Brand: MEDLINE

## (undated) DEVICE — YANKAUER,FLEXIBLE HANDLE,REGLR CAPACITY: Brand: MEDLINE INDUSTRIES, INC.

## (undated) DEVICE — SOLUTION SCRB 4OZ 4% CHG CLN BASE FOR PT SKIN ANTISEPSIS

## (undated) DEVICE — DISCONTINUED USE 111569 BF APPLICATOR COTN TIP 6IN ST

## (undated) DEVICE — 3M™ TEGADERM™ TRANSPARENT FILM DRESSING FRAME STYLE, 1624W, 2-3/8 IN X 2-3/4 IN (6 CM X 7 CM), 100/CT 4CT/CASE: Brand: 3M™ TEGADERM™

## (undated) DEVICE — CLEANSER WND CLN DEB SYS IRRISEPT

## (undated) DEVICE — COVER LT HNDL BLU PLAS

## (undated) DEVICE — TOWEL,OR,DSP,ST,NATURAL,DLX,4/PK,20PK/CS: Brand: MEDLINE

## (undated) DEVICE — YANKAUER,BULB TIP,W/O VENT,RIGID,STERILE: Brand: MEDLINE

## (undated) DEVICE — STRAP ARMBRD W1.5XL32IN FOAM STR YET SFT W/ HK AND LOOP

## (undated) DEVICE — PROTECTOR ULN NRV PUR FOAM HK LOOP STRP ANATOMICALLY

## (undated) DEVICE — SUTURE MCRYL SZ 5-0 L18IN ABSRB UD PC-3 L16MM 3/8 CIR Y844G

## (undated) DEVICE — SYRINGE BLB 2 PC STER 50

## (undated) DEVICE — STANDARD HYPODERMIC NEEDLE,POLYPROPYLENE HUB: Brand: MONOJECT

## (undated) DEVICE — MARKER,SKIN,WI/RULER AND LABELS: Brand: MEDLINE

## (undated) DEVICE — BNDG,ELSTC,MATRIX,STRL,4"X5YD,LF,HOOK&LP: Brand: MEDLINE

## (undated) DEVICE — JAR BONE ST

## (undated) DEVICE — GAUZE,PACKING STRIP,PLAIN,1/2"X5YD,STRL: Brand: CURAD

## (undated) DEVICE — SUTURE MCRYL SZ 4-0 L27IN ABSRB UD L24MM PS-1 3/8 CIR PRIM Y935H

## (undated) DEVICE — SUTURE ETHLN SZ 5-0 L18IN NONABSORBABLE BLK L13MM P-3 3/8 698H

## (undated) DEVICE — PREP SOL PVP IODINE 4%  4 OZ/BTL

## (undated) DEVICE — SUTURE CHROMIC GUT SZ 3-0 L27IN ABSRB BRN L26MM SH 1/2 CIR G122H

## (undated) DEVICE — SVMMC HD AND NK PK

## (undated) DEVICE — CANISTER NEG PRSS 1000ML W/ GEL INFOVAC

## (undated) DEVICE — PREMIUM DRY TRAY LF: Brand: MEDLINE INDUSTRIES, INC.

## (undated) DEVICE — CODMAN® SURGICAL PATTIES 1/2" X 1/2" (1.27CM X 1.27CM): Brand: CODMAN®

## (undated) DEVICE — CULTURETTE AERO/ANEROBIC RED/BLUE BUNDLE

## (undated) DEVICE — COVER,LIGHT HANDLE,FLX,2/PK: Brand: MEDLINE INDUSTRIES, INC.